# Patient Record
Sex: MALE | Race: WHITE | NOT HISPANIC OR LATINO | Employment: OTHER | URBAN - METROPOLITAN AREA
[De-identification: names, ages, dates, MRNs, and addresses within clinical notes are randomized per-mention and may not be internally consistent; named-entity substitution may affect disease eponyms.]

---

## 2017-04-21 ENCOUNTER — HISTORICAL (OUTPATIENT)
Dept: ADMINISTRATIVE | Facility: HOSPITAL | Age: 52
End: 2017-04-21

## 2017-04-21 LAB
ALT (SGPT): 30 IU/L (ref 3–33)
AST SERPL-CCNC: 22 IU/L (ref 10–40)
BUN SERPL-MCNC: 17 MG/DL (ref 8–20)
CALCIUM SERPL-MCNC: 9 MG/DL (ref 7.7–10.4)
CHLORIDE: 104 MMOL/L (ref 98–110)
CO2 SERPL-SCNC: 26.3 MMOL/L (ref 22.8–31.6)
CREATININE RANDOM URINE: 161 MG/DL
CREATININE: 0.82 MG/DL (ref 0.6–1.4)
GLUCOSE: 241 MG/DL (ref 70–99)
HBA1C MFR BLD: 8.8 % (ref 3.1–6.5)
MICROALBUM.,U,RANDOM: 14.2 MCG/ML (ref 0–19.9)
MICROALBUMIN/CREATININE RATIO: 9 (ref 0–30)
POTASSIUM SERPL-SCNC: 4 MMOL/L (ref 3.5–5)
SODIUM: 138 MMOL/L (ref 134–144)

## 2017-05-25 RX ORDER — ATORVASTATIN CALCIUM 10 MG/1
1 TABLET, FILM COATED ORAL DAILY
COMMUNITY
Start: 2017-03-29 | End: 2017-07-12 | Stop reason: DRUGHIGH

## 2017-05-25 RX ORDER — ASPIRIN 81 MG/1
1 TABLET ORAL DAILY
COMMUNITY
Start: 2016-07-18 | End: 2017-07-12 | Stop reason: SDUPTHER

## 2017-05-31 ENCOUNTER — OFFICE VISIT (OUTPATIENT)
Dept: FAMILY MEDICINE | Facility: CLINIC | Age: 52
End: 2017-05-31
Payer: COMMERCIAL

## 2017-05-31 VITALS
SYSTOLIC BLOOD PRESSURE: 126 MMHG | HEIGHT: 68 IN | BODY MASS INDEX: 25.01 KG/M2 | HEART RATE: 85 BPM | WEIGHT: 165 LBS | DIASTOLIC BLOOD PRESSURE: 77 MMHG

## 2017-05-31 DIAGNOSIS — E78.5 DYSLIPIDEMIA: ICD-10-CM

## 2017-05-31 DIAGNOSIS — E11.9 TYPE 2 DIABETES MELLITUS WITHOUT COMPLICATION, WITHOUT LONG-TERM CURRENT USE OF INSULIN: Primary | ICD-10-CM

## 2017-05-31 PROBLEM — Z13.89 ENCOUNTER FOR SCREENING FOR OTHER DISORDER: Status: ACTIVE | Noted: 2017-05-31

## 2017-05-31 PROBLEM — F17.290 NICOTINE DEPENDENCE, OTHER TOBACCO PRODUCT, UNCOMPLICATED: Status: ACTIVE | Noted: 2017-05-31

## 2017-05-31 PROCEDURE — 99214 OFFICE O/P EST MOD 30 MIN: CPT | Mod: ,,, | Performed by: INTERNAL MEDICINE

## 2017-05-31 PROCEDURE — 3045F PR MOST RECENT HEMOGLOBIN A1C LEVEL 7.0-9.0%: CPT | Mod: ,,, | Performed by: INTERNAL MEDICINE

## 2017-05-31 NOTE — PROGRESS NOTES
Subjective:       Patient ID: Pedro Luis Pérez is a 51 y.o. male.    Chief Complaint: Diabetes and Hyperlipidemia    Pt comes for follow up on DM and lipids. A1c is 8.8, LDL is 108. Urine Feroz albumin is N  Could not tolerate Lipitor and stopped. Sugar logs reviewed and sugars >180 in AM and PM    Patient blood sugars long segment been reviewed. He recently had an eye examination which was unremarkable. His last nutritional examination was approximately 5 years back.      Diabetes   He presents for his follow-up diabetic visit. He has type 2 diabetes mellitus. His disease course has been worsening. Pertinent negatives for hypoglycemia include no sweats or tremors. Pertinent negatives for diabetes include no blurred vision, no chest pain, no foot paresthesias, no foot ulcerations, no visual change and no weight loss. Pertinent negatives for hypoglycemia complications include no blackouts. Symptoms are worsening. Pertinent negatives for diabetic complications include no nephropathy, peripheral neuropathy or retinopathy. Risk factors for coronary artery disease include male sex and tobacco exposure. Current diabetic treatment includes diet and oral agent (dual therapy). His weight is stable. He is following a diabetic diet. He has not had a previous visit with a dietitian. His breakfast blood glucose range is generally 180-200 mg/dl. His lunch blood glucose range is generally 180-200 mg/dl. He does not see a podiatrist.Eye exam is current.   Hyperlipidemia   This is a chronic problem. The problem is uncontrolled. Recent lipid tests were reviewed and are high. He has no history of liver disease, obesity or nephrotic syndrome. Pertinent negatives include no chest pain. Risk factors for coronary artery disease include diabetes mellitus, dyslipidemia, male sex and a sedentary lifestyle.     Past Medical History:   Diagnosis Date    Anxiety     Diabetes mellitus, type 2        History reviewed. No pertinent surgical  history.    Family History   Problem Relation Age of Onset    Heart disease Mother     Diabetes Father        Social History     Social History    Marital status:      Spouse name: N/A    Number of children: N/A    Years of education: N/A     Social History Main Topics    Smoking status: Never Smoker    Smokeless tobacco: Former User     Types: Chew    Alcohol use No    Drug use: No    Sexual activity: Yes     Partners: Female     Other Topics Concern    None     Social History Narrative    None       Current Outpatient Prescriptions   Medication Sig Dispense Refill    aspirin (ECOTRIN) 81 MG EC tablet Take 1 tablet by mouth Daily.      SITagliptan-metformin (JANUMET XR) 100-1,000 mg TM24 Take 1 tablet by mouth Daily.      atorvastatin (LIPITOR) 10 MG tablet Take 1 tablet by mouth Daily.       No current facility-administered medications for this visit.        Review of patient's allergies indicates:  No Known Allergies      Review of Systems   Constitutional: Negative for activity change, chills, fever and weight loss.   HENT: Negative for congestion, drooling, ear pain, nosebleeds, rhinorrhea, sore throat and voice change.    Eyes: Negative for blurred vision, redness and visual disturbance.   Respiratory: Negative for apnea, cough, choking and wheezing.    Cardiovascular: Negative for chest pain, palpitations and leg swelling.   Gastrointestinal: Negative for abdominal distention, abdominal pain and anal bleeding.   Skin: Negative for color change and rash.   Neurological: Negative for tremors.       Objective:      Physical Exam   Constitutional: He appears well-developed.   Cardiovascular:   Pulses:       Dorsalis pedis pulses are 1+ on the right side, and 1+ on the left side.        Posterior tibial pulses are 1+ on the right side, and 1+ on the left side.   Musculoskeletal:        Right foot: There is normal range of motion and no deformity.        Left foot: There is normal range of  motion and no deformity.   Feet:   Right Foot:   Protective Sensation: 4 sites tested. 4 sites sensed.   Skin Integrity: Negative for ulcer or blister.   Left Foot:   Protective Sensation: 4 sites tested. 4 sites sensed.   Skin Integrity: Negative for ulcer or blister.   Skin: Skin is warm and dry.   Nursing note and vitals reviewed.      Assessment:       1. Type 2 diabetes mellitus without complication, without long-term current use of insulin    2. Dyslipidemia        Plan:       Type 2 diabetes mellitus without complication, without long-term current use of insulin  -     empagliflozin (JARDIANCE) 10 mg Tab; Take 10 mg by mouth once daily.  Dispense: 30 tablet; Refill: 5    Dyslipidemia    Pt started on Jardiance. Side effects explained. Risk for UTI  Keep sugar logs.  Follow up in 6 weeks  Referral has been given per dietitian for reevaluating patient's diet.

## 2017-05-31 NOTE — PATIENT INSTRUCTIONS
Using a Blood Sugar Log    You have diabetes. This means your body has trouble regulating a sugar called glucose. To help manage your diabetes, youll need to check your blood sugar level as directed by your healthcare provider. Keeping a log of your blood sugar levels will help you track your blood sugar readings. Its a simple and easy way to see how well you are controlling your diabetes.  Checking your blood sugar level  You can check your blood sugar level with a blood glucose meter. Youll first prick the side of your finger with a tiny lancet to draw a tiny drop of blood onto the test strip. Some glucose meters let you use another place on your body to test. But these other places should not be used in some cases as they may be inaccurate. Follow the instructions for your glucose meter. And talk with your healthcare provider before doing the test on other places.  The strip goes into the meter first, then a drop of blood is placed on the tip of the strip. The meter then shows a reading that tells you the level of your blood sugar. Your readings should be in your target range as often as possible. This means not too high or too low. Staying in this range helps lower your risk for complications. Your healthcare provider will help you figure out the target range that is best for you.  Tracking your readings  Every time you check your blood sugar, use your log to keep track of your readings. Your meter will also probably have a memory feature that your healthcare provider can check at your next visit. You may be advised by your healthcare provider to check your blood sugar in the morning, at bedtime, and before and after meals. Be sure to write down all of your numbers. Also use your log to record things that might have affected your blood sugar. Some examples include being sick, certain medicines, being physically active, feeling stressed, or skipping meals.   Lessons learned from your readings  Tracking your  blood sugar readings helps you see patterns. These patterns tell you how your actions affect your blood sugar. For instance, you may have higher numbers after eating certain foods or lower numbers after exercise. They just help you understand how to stay in your target range more often, so that your diabetes remains in good control.  Sharing your log with your healthcare team  Bring your blood sugar log and glucose meter with you to all of your healthcare appointments. This can help your healthcare team make changes to your treatment plan, if needed. This may involve making changes in what you eat, what medicines you take, or how much you exercise.  To learn more  The resources below can help you learn more:  · American Diabetes Association 429-610-6705 www.diabetes.org  · [employer] 057-915-1570 www.lighthouse.org  · [facility] [place] 497-008-9184 www.nei.nih.gov  · [facility] 875-522-4839 www.hormone.org  Date Last Reviewed: 5/1/2016  © 7811-0146 [employer] [address] [place] [address], [place] [address]. All rights reserved. This information is not intended as a substitute for professional medical care. Always follow your healthcare professional's instructions.

## 2017-06-16 ENCOUNTER — TELEPHONE (OUTPATIENT)
Dept: FAMILY MEDICINE | Facility: CLINIC | Age: 52
End: 2017-06-16

## 2017-06-19 DIAGNOSIS — E11.9 TYPE 2 DIABETES MELLITUS WITHOUT COMPLICATION, WITHOUT LONG-TERM CURRENT USE OF INSULIN: ICD-10-CM

## 2017-07-12 ENCOUNTER — OFFICE VISIT (OUTPATIENT)
Dept: FAMILY MEDICINE | Facility: CLINIC | Age: 52
End: 2017-07-12
Payer: COMMERCIAL

## 2017-07-12 VITALS
WEIGHT: 161 LBS | BODY MASS INDEX: 24.4 KG/M2 | HEIGHT: 68 IN | SYSTOLIC BLOOD PRESSURE: 118 MMHG | HEART RATE: 81 BPM | DIASTOLIC BLOOD PRESSURE: 77 MMHG

## 2017-07-12 DIAGNOSIS — E11.9 TYPE 2 DIABETES MELLITUS WITHOUT COMPLICATION, WITHOUT LONG-TERM CURRENT USE OF INSULIN: Primary | ICD-10-CM

## 2017-07-12 DIAGNOSIS — E78.5 DYSLIPIDEMIA: ICD-10-CM

## 2017-07-12 PROBLEM — Z13.89 ENCOUNTER FOR SCREENING FOR OTHER DISORDER: Status: RESOLVED | Noted: 2017-05-31 | Resolved: 2017-07-12

## 2017-07-12 PROCEDURE — 3045F PR MOST RECENT HEMOGLOBIN A1C LEVEL 7.0-9.0%: CPT | Mod: ,,, | Performed by: INTERNAL MEDICINE

## 2017-07-12 PROCEDURE — 4010F ACE/ARB THERAPY RXD/TAKEN: CPT | Mod: ,,, | Performed by: INTERNAL MEDICINE

## 2017-07-12 PROCEDURE — 99214 OFFICE O/P EST MOD 30 MIN: CPT | Mod: ,,, | Performed by: INTERNAL MEDICINE

## 2017-07-12 RX ORDER — ATORVASTATIN CALCIUM 10 MG/1
TABLET, FILM COATED ORAL
Qty: 18 TABLET | Refills: 3 | Status: SHIPPED | OUTPATIENT
Start: 2017-07-12 | End: 2017-11-13 | Stop reason: SDUPTHER

## 2017-07-12 RX ORDER — RAMIPRIL 1.25 MG/1
CAPSULE ORAL
Qty: 40 CAPSULE | Refills: 3 | Status: SHIPPED | OUTPATIENT
Start: 2017-07-12 | End: 2018-07-12 | Stop reason: SDUPTHER

## 2017-07-12 RX ORDER — ASPIRIN 81 MG/1
81 TABLET ORAL DAILY
Qty: 100 TABLET | Refills: 1 | Status: SHIPPED | OUTPATIENT
Start: 2017-07-12

## 2017-07-12 NOTE — PROGRESS NOTES
Subjective:       Patient ID: Pedro Luis Pérez is a 51 y.o. male.    Chief Complaint: Diabetes and Hyperlipidemia    Diabetes   He presents for his follow-up diabetic visit. He has type 2 diabetes mellitus. His disease course has been improving. Pertinent negatives for hypoglycemia include no dizziness or nervousness/anxiousness. Associated symptoms include weight loss. Pertinent negatives for diabetes include no chest pain, no foot paresthesias, no foot ulcerations, no polydipsia, no polyuria and no weakness. There are no hypoglycemic complications. Risk factors for coronary artery disease include diabetes mellitus, dyslipidemia, male sex and sedentary lifestyle. Current diabetic treatment includes oral agent (triple therapy) (Janumet, Jardiance). His weight is decreasing steadily. His home blood glucose trend is decreasing steadily. His breakfast blood glucose range is generally 130-140 mg/dl. An ACE inhibitor/angiotensin II receptor blocker is not being taken. He does not see a podiatrist.Eye exam is current.   Hyperlipidemia   This is a chronic problem. Exacerbating diseases include diabetes. He has no history of liver disease or obesity. Pertinent negatives include no chest pain or shortness of breath. Current antihyperlipidemic treatment includes statins (non compliant- feet hurts). Compliance problems include medication side effects.  Risk factors for coronary artery disease include a sedentary lifestyle, male sex, hypertension, diabetes mellitus and dyslipidemia.       Past Medical History:   Diagnosis Date    Anxiety     Diabetes mellitus, type 2      Social History     Social History    Marital status:      Spouse name: N/A    Number of children: N/A    Years of education: N/A     Occupational History    Not on file.     Social History Main Topics    Smoking status: Never Smoker    Smokeless tobacco: Former User     Types: Chew    Alcohol use No    Drug use: No    Sexual activity: Yes      "Partners: Female     Other Topics Concern    Not on file     Social History Narrative    No narrative on file     History reviewed. No pertinent surgical history.  Family History   Problem Relation Age of Onset    Heart disease Mother     Diabetes Father        Review of Systems   Constitutional: Positive for weight loss. Negative for activity change, appetite change, chills, fever and unexpected weight change (lost wt expectedly).   HENT: Negative for congestion.    Eyes: Negative for pain and itching.   Respiratory: Negative for cough, chest tightness and shortness of breath.    Cardiovascular: Negative for chest pain, palpitations and leg swelling.   Gastrointestinal: Negative for abdominal distention and anal bleeding.   Endocrine: Negative for cold intolerance, heat intolerance, polydipsia and polyuria.   Genitourinary: Negative for dysuria, flank pain and frequency.   Neurological: Negative for dizziness, facial asymmetry and weakness.   Hematological: Negative for adenopathy. Does not bruise/bleed easily.   Psychiatric/Behavioral: Negative for agitation and behavioral problems. The patient is not nervous/anxious.        Objective:       Vitals:    07/12/17 0807   BP: 118/77   Pulse: 81   Weight: 73 kg (161 lb)   Height: 5' 8" (1.727 m)     Physical Exam   Constitutional: He is oriented to person, place, and time. He appears well-developed.   HENT:   Head: Normocephalic and atraumatic.   Nose: Nose normal.   Mouth/Throat: Oropharynx is clear and moist. No oropharyngeal exudate.   Eyes: Conjunctivae and EOM are normal.   Neck: Normal range of motion. Neck supple. No JVD present. No tracheal deviation present. No thyromegaly present.   Cardiovascular: Normal rate, regular rhythm and normal heart sounds.  Exam reveals no gallop and no friction rub.    No murmur heard.  Pulmonary/Chest: Effort normal and breath sounds normal. No respiratory distress. He has no wheezes. He has no rales.   Abdominal: Soft. Bowel " sounds are normal. He exhibits no distension. There is no tenderness.   Musculoskeletal: Normal range of motion.   Feet:   Right Foot:   Protective Sensation: 4 sites tested. 4 sites sensed.   Skin Integrity: Negative for ulcer or blister.   Left Foot:   Protective Sensation: 4 sites tested. 4 sites sensed.   Skin Integrity: Negative for ulcer or blister.   Neurological: He is alert and oriented to person, place, and time. He has normal reflexes.   Skin: Skin is warm and dry.   Psychiatric: He has a normal mood and affect.   Nursing note and vitals reviewed.      Assessment:       1. Type 2 diabetes mellitus without complication, without long-term current use of insulin    2. Dyslipidemia         Plan:           Type 2 diabetes mellitus without complication, without long-term current use of insulin  -     SITagliptan-metformin (JANUMET XR) 100-1,000 mg TM24; Take 1 tablet by mouth Daily.  Dispense: 90 tablet; Refill: 3  -     empagliflozin (JARDIANCE) 10 mg Tab; Take 10 mg by mouth once daily.  Dispense: 90 tablet; Refill: 3  -     aspirin (ECOTRIN) 81 MG EC tablet; Take 1 tablet (81 mg total) by mouth Daily.  Dispense: 100 tablet; Refill: 1  -     Comprehensive metabolic panel; Future; Expected date: 10/12/2017  -     Hemoglobin A1c; Future; Expected date: 07/12/2017  -     ramipril (ALTACE) 1.25 MG capsule; On Monday, Wednesday and Friday AM  Dispense: 40 capsule; Refill: 3    Dyslipidemia  -     atorvastatin (LIPITOR) 10 MG tablet; 1/2 tablet three times a week at bedtime. Dose reduced due to side effects at higher dose  Dispense: 18 tablet; Refill: 3  -     Lipid panel; Future; Expected date: 10/12/2017    Patient states that his blood sugars are getting better. Morning blood sugars are in 130s.  Medication added was Jardiance. He does not experience any side effects leg burning sensation in the urine.    He has not been taking Lipitor because of side effects in the feet. I've told him to take Lipitor perhaps 2  or 3 times a week-half a pill. I will also add ramipril 1.25 mg 3 times a week for kidney protection.    I will see him back in 3 months to check his hemoglobin A1c, lipid panel and chemistry. In the meantime he'll continue to watch his diet, cut down sugars and incorporate a regular exercise regimen.

## 2017-07-12 NOTE — PATIENT INSTRUCTIONS
"  Exercise to Manage Your Blood Sugar    Being physically active every day can help you manage your blood sugar. Thats because an active lifestyle can improve your bodys ability to use insulin. Daily activity can also help delay or prevent complications of diabetes. And its a great way to relieve stress. If you arent normally active, be sure to consult your healthcare provider before getting started.  How much activity do you need?  If daily activity is new to you, start slow and steady. Begin with 10 minutes of activity each day. Then work up to at least 40 minutes of moderate to high intensity physical activity on at least 3 to 4 days each week. Do this by adding a few minutes each week. It doesnt have to be done all at once. Each active period throughout the day adds up.  Just move!  You dont have to join a gym or own pricey sports equipment. Just get out and walk. Walking is an aerobic exercise that makes your heart and lungs work hard. It helps your heart and blood vessels. Walking needs only a sturdy pair of sneakers and your own two feet. The more you walk, the easier it gets:  · Schedule time every day to move your feet.  · Make it part of your daily routine.  · Walk with a friend or a group to keep it interesting and fun.  · Try taking several short walks during the day to meet your daily activity goal.  A pedometer makes every step count  A pedometer is a small device that keeps track of how many steps you take. You can clip it to your belt (or a strap on your arm or leg) and go about your daily routine. "Smartphones" now also have apps to record your walking. At the end of the day, the pedometer shows the total number of steps you took. Use a pedometer to set daily goals for yourself. For instance, if you walk 4,000 steps a day, try adding 200 more steps each day. Aim for a goal of 7,500. With every step, youre doing a little more to help your body use insulin.   Adding resistance " exercise  Resistance exercise (also called strength training), makes muscles stronger. It also helps muscles use insulin better. Ask your healthcare provider whether this type of exercise is right for you. If it is, your healthcare provider can help you work it in to your activity plan.  Staying safe  Being active may cause blood sugar to drop faster than usual. This is especially true if you take medicine to manage your blood sugar. But there are things you can do to help reduce the risk of accidental lows. Keep these tips in mind:  · Always carry identification when you exercise outside your home. Carry a cell phone to use in case of emergency.  · If you can, include friends and family in your activities.  · Wear a medical ID bracelet that says you have diabetes.  · Use the right safety equipment for the activity you do (such as a bicycle helmet when you ride a bicycle outdoors). Wear closed-toed shoes that fit your feet well.  · Drink plenty of water before and during activity.  · Keep a fast-acting sugar (such as glucose tablets) on hand in case of low blood sugar.  · Dress properly for the weather. Wear a hat if its yuli, or wait until evening if its too hot.  · Avoid being active for long periods in very hot or very cold weather.  · Skip activity if youre sick.     Notice how activity affects blood sugar  Physical activity is important when you have diabetes. But you need to keep an eye on your blood sugar level. Check often if you have been active for longer than usual, or if the activity was unplanned. Make it a habit to check your blood sugar before being active. And check again a few hours later. Use your log book to write down how activity affects your numbers. If you take insulin, you may be able to adjust your dose before a planned activity. This can help prevent lows. You may also need to take a small carbohydrate snack before the exercise. Talk to your healthcare provider to learn more.    Date  Last Reviewed: 6/1/2016  © 2162-2306 [employer] [address] [place] [address], [place] [address]. All rights reserved. This information is not intended as a substitute for professional medical care. Always follow your healthcare professional's instructions.

## 2017-10-12 ENCOUNTER — OFFICE VISIT (OUTPATIENT)
Dept: FAMILY MEDICINE | Facility: CLINIC | Age: 52
End: 2017-10-12
Payer: COMMERCIAL

## 2017-10-12 VITALS
SYSTOLIC BLOOD PRESSURE: 124 MMHG | DIASTOLIC BLOOD PRESSURE: 79 MMHG | HEART RATE: 71 BPM | HEIGHT: 68 IN | BODY MASS INDEX: 24.55 KG/M2 | WEIGHT: 162 LBS

## 2017-10-12 DIAGNOSIS — E78.5 DYSLIPIDEMIA: ICD-10-CM

## 2017-10-12 DIAGNOSIS — R10.9 RIGHT FLANK PAIN: ICD-10-CM

## 2017-10-12 DIAGNOSIS — E11.9 TYPE 2 DIABETES MELLITUS WITHOUT COMPLICATION, WITHOUT LONG-TERM CURRENT USE OF INSULIN: Primary | ICD-10-CM

## 2017-10-12 DIAGNOSIS — K80.20 CALCULUS OF GALLBLADDER WITHOUT CHOLECYSTITIS WITHOUT OBSTRUCTION: ICD-10-CM

## 2017-10-12 DIAGNOSIS — Z11.59 NEED FOR HEPATITIS C SCREENING TEST: ICD-10-CM

## 2017-10-12 PROCEDURE — 99214 OFFICE O/P EST MOD 30 MIN: CPT | Mod: ,,, | Performed by: INTERNAL MEDICINE

## 2017-10-12 NOTE — PROGRESS NOTES
Subjective:       Patient ID: Pedro Luis Pérez is a 52 y.o. male.    Chief Complaint: Diabetes (lab review) and Hyperlipidemia    Mr. Pérez is a 52-year-old  male who comes for follow-up. He has underlying diabetes mellitus, dyslipidemia. His fasting blood sugar in recent labs was 171. Hemoglobin A1c is pending. He states that his blood sugars are gradually getting better. He is on a combination of Janumet and Jardiance.    He is also experiencing right posterior flank pain since several years which gets aggravated then and now. Recently he was trying to unload some stuff any further aggravation of his right posterior flank pain. There is no radiation of this pain.    I dictate the opportunity to review his old CT scan which had shown a less than 1 cm gallstone at the neck and also a non  OBSTRUCTING right renal calculus.    Thus far, he does not recall passing any stones or noticing any blood in his urine. He does not have any prostate symptoms.      Diabetes   He presents for his follow-up diabetic visit. He has type 2 diabetes mellitus. No MedicAlert identification noted. Pertinent negatives for hypoglycemia include no dizziness, nervousness/anxiousness, pallor, speech difficulty or sweats. Pertinent negatives for diabetes include no chest pain, no polydipsia, no polyphagia, no polyuria and no weakness. Pertinent negatives for hypoglycemia complications include no nocturnal hypoglycemia. Risk factors for coronary artery disease include hypertension, male sex, sedentary lifestyle and dyslipidemia. He has not had a previous visit with a dietitian. An ACE inhibitor/angiotensin II receptor blocker is being taken. He does not see a podiatrist.Eye exam is not current.   Hyperlipidemia   This is a chronic problem. The current episode started more than 1 year ago. Pertinent negatives include no chest pain or shortness of breath. Current antihyperlipidemic treatment includes statins.   Back Pain   This is a  chronic problem. The current episode started more than 1 year ago (5-6 yrs ago). The problem occurs intermittently. The problem has been waxing and waning since onset. Pertinent negatives include no chest pain, dysuria, fever or weakness. Risk factors include sedentary lifestyle. The treatment provided mild relief.   Flank Pain   This is a chronic problem. The current episode started more than 1 year ago. The problem occurs intermittently. The problem has been waxing and waning since onset. The pain is present in the costovertebral angle (rt). The quality of the pain is described as cramping. The pain does not radiate. The pain is at a severity of 3/10. The symptoms are aggravated by bending. Pertinent negatives include no chest pain, dysuria, fever or weakness. Risk factors include lack of exercise and renal stones (also solitary gall stone). He has tried NSAIDs for the symptoms. The treatment provided mild relief.       Past Medical History:   Diagnosis Date    Anxiety     Diabetes mellitus, type 2      Social History     Social History    Marital status:      Spouse name: N/A    Number of children: N/A    Years of education: N/A     Occupational History    Not on file.     Social History Main Topics    Smoking status: Never Smoker    Smokeless tobacco: Former User     Types: Chew    Alcohol use No    Drug use: No    Sexual activity: Yes     Partners: Female     Other Topics Concern    Not on file     Social History Narrative    No narrative on file     History reviewed. No pertinent surgical history.  Family History   Problem Relation Age of Onset    Heart disease Mother     Diabetes Father        Review of Systems   Constitutional: Negative for activity change, appetite change, chills, fever and unexpected weight change (lost wt expectedly).   HENT: Negative for congestion.    Eyes: Negative for pain and itching.   Respiratory: Negative for cough, chest tightness and shortness of breath.     Cardiovascular: Negative for chest pain, palpitations and leg swelling.   Gastrointestinal: Negative for abdominal distention and anal bleeding.   Endocrine: Negative for cold intolerance, heat intolerance, polydipsia, polyphagia and polyuria.        Underlying diabetes and dyslipidemia.   Genitourinary: Negative for dysuria, flank pain and frequency.   Musculoskeletal: Positive for back pain.   Skin: Negative for pallor.   Neurological: Negative for dizziness, facial asymmetry, speech difficulty and weakness.   Hematological: Negative for adenopathy. Does not bruise/bleed easily.   Psychiatric/Behavioral: Negative for agitation and behavioral problems. The patient is not nervous/anxious.        Objective:      Physical Exam   Constitutional: He is oriented to person, place, and time. He appears well-developed.   HENT:   Head: Normocephalic and atraumatic.   Nose: Nose normal.   Mouth/Throat: Oropharynx is clear and moist. No oropharyngeal exudate.   Eyes: Conjunctivae and EOM are normal.   Neck: Normal range of motion. Neck supple. No JVD present. No tracheal deviation present. No thyromegaly present.   Cardiovascular: Normal rate, regular rhythm and normal heart sounds.  Exam reveals no gallop and no friction rub.    No murmur heard.  Pulses:       Dorsalis pedis pulses are 1+ on the right side, and 1+ on the left side.        Posterior tibial pulses are 1+ on the right side, and 1+ on the left side.   Pulmonary/Chest: Effort normal and breath sounds normal. No respiratory distress. He has no wheezes. He has no rales.   Abdominal: Soft. Bowel sounds are normal. He exhibits no distension. There is no tenderness.   Musculoskeletal: Normal range of motion.        Right foot: There is normal range of motion and no deformity.        Left foot: There is normal range of motion and no deformity.   Feet:   Right Foot:   Protective Sensation: 4 sites tested. 4 sites sensed.   Skin Integrity: Negative for ulcer or blister.    Left Foot:   Protective Sensation: 4 sites tested. 4 sites sensed.   Skin Integrity: Negative for ulcer or blister.   Neurological: He is alert and oriented to person, place, and time. He has normal reflexes.   Skin: Skin is warm and dry.   Psychiatric: He has a normal mood and affect.   Nursing note and vitals reviewed.      Assessment:       1. Type 2 diabetes mellitus without complication, without long-term current use of insulin    2. Dyslipidemia    3. Right flank pain    4. Calculus of gallbladder without cholecystitis without obstruction    5. Need for hepatitis C screening test       patient's blood sugar is 171. Hemoglobin A1c has not been done for some reason at this point. This will be repeated.  Plan:           Type 2 diabetes mellitus without complication, without long-term current use of insulin  -     Microalbumin/creatinine urine ratio; Future  -     Hemoglobin A1c; Future; Expected date: 10/12/2017    Dyslipidemia  -     Lipid panel; Future; Expected date: 10/12/2017    Right flank pain  -     US Abdomen Limited  -     Urinalysis; Future    Calculus of gallbladder without cholecystitis without obstruction  -     US Abdomen Limited    Need for hepatitis C screening test  -     Hepatitis C antibody; Future; Expected date: 10/12/2017    Advised Mr. Pérez to monitor Blood sugars at home and record them.  Exercise, watch diet and loose weight.  keep a close eye on feet and keep them clean. Annual eye examination. Annual influenza vaccine.  Monitor HgbA1c every 3 to 6 months. Monitor urine microalbumin every year.keep LDL less than 100. Monitor blood pressure and target blood pressure 120/70.      Given his flank pain, I will order ultrasound off gallbladder and right-sided kidney also. He will carefully watch his urine for any problems. I'll check a urine analysis also. Check out for any hematuria. He will continue with his stretching exercises. Next    I will follow him in 3-4 months or earlier based  upon ultrasound findings.

## 2017-10-12 NOTE — PATIENT INSTRUCTIONS
Healthy Meals for Diabetes     A healthcare provider will help you develop a meal plan that fits your needs.   Ask your healthcare team to help you make a meal plan that fits your needs. Your meal plan tells you when to eat your meals and snacks, what kinds of foods to eat, and how much of each food to eat. You dont have to give up all the foods you like. But you do need to follow some guidelines.  Choose healthy carbohydrates  Starches, sugars, and fiber are all types of carbohydrates. Fiber can help lower your cholesterol and triglycerides. Fiber is also healthy for your heart. You should have 20 to 35 grams of total fiber each day. Fiber-rich foods include:  · Whole-grain breads and cereals  · Bulgur wheat  · Brown rice     · Whole-wheat pasta  · Fruits and vegetables  · Dry beans, and peas   Keep track of the amount of carbohydrates you eat. This can help you keep the right balance of physical activity and medicine. The amount of carbohydrates needed will vary for each person. It depends on many things such as your health, the medicines you take, and how active you are. Your healthcare team will help you figure out the right amount of carbohydrates for you. You may start with around 45 to 60 grams of carbohydrates per meal, depending on your situation.   Here are some examples of foods containing about 15 grams of carbohydrates (1 serving of carbohydrates):  · 1/2 cup of canned or frozen fruit  · A small piece of fresh fruit (4 ounces)  · 1 slice of bread  · 1/2 cup of oatmeal  · 1/3 cup of rice  · 4 to 6 crackers  · 1/2 English muffin  · 1/2 cup of black beans  · 1/4 of a large baked potato (3 ounces)  · 2/3 cup of plain fat-free yogurt  · 1 cup of soup  · 1/2 cup of casserole  · 6 chicken nuggets  · 2-inch-square brownie or cake without frosting  · 2 small cookies  · 1/2 cup of ice cream or sherbet  Choose healthy protein foods  Eating protein that is low in fat can help you control your weight. It also  helps keep your heart healthy. Low-fat protein foods include:  · Fish  · Plant proteins, such as dry beans and peas, nuts, and soy products like tofu and soymilk  · Lean meat with all visible fat removed  · Poultry with the skin removed  · Low-fat or nonfat milk, cheese, and yogurt  Limit unhealthy fats and sugar  Saturated and trans fats are unhealthy for your heart. They raise LDL (bad) cholesterol. Fat is also high in calories, so it can make you gain weight. To cut down on unhealthy fats and sugar, limit these foods:  · Butter or margarine  · Palm and palm kernel oils and coconut oil  · Cream  · Cheese  · Ernst  · Lunch meats     · Ice cream  · Sweet bakery goods such as pies, muffins, and donuts  · Jams and jellies  · Candy bars  · Regular sodas   How much to eat  The amount of food you eat affects your blood sugar. It also affects your weight. Your healthcare team will tell you how much of each type of food you should eat.  · Use measuring cups and spoons and a food scale to measure serving sizes.  · Learn what a correct serving size looks like on your plate. This will help when you are away from home and cant measure your servings.  · Eat only the number of servings given on your meal plan for each food. Dont take seconds.  · Learn to read food labels. Be sure to look at serving size, total carbohydrates, fiber, calories, sugar, and saturated and trans fats. Look for healthier alternatives to foods that have added sugar.  · Plan ahead for parties so you can still have a good time without going overboard with unhealthy food choices. Set a good example yourself by bringing a healthy dish to pot lucks.   Choose healthy snacks  When it comes to snacks, we usually think about foods with added sugar and fats. But there are many other options for healthier snack choices. Here are a few snack ideas to choose from:  Snacks with less than 5 grams of carbohydrates  · 1 piece of string cheese  · 3 celery sticks plus 1  tablespoon of peanut butter  · 5 cherry tomatoes plus 1 tablespoon of ranch dressing  · 1 hard-boiled egg  · 1/4 cup of fresh blueberries  ·  5 baby carrots  · 1 cup of light popcorn  · 1/2 cup of sugar-free gelatin  · 15 almonds  Snacks with about 10 to 20 grams of carbohydrates  · 1/3 cup of hummus plus 1 cup of fresh cut nonstarchy vegetables (carrots, green peppers, broccoli, celery, or a combination)  · 1/2 cup of fresh or canned fruit plus 1/4 cup of cottage cheese  · 1/2 cup of tuna salad with 4 crackers  · 2 rice cakes and a tablespoon of peanut butter  · 1 small apple or orange  · 3 cups light popcorn  · 1/2 of a turkey sandwich (1 slice of whole-wheat bread, 2 ounces of turkey, and mustard)  Portion sizes are important to controlling your blood sugar and staying at a healthy weight. Stock up on healthy snack items so you always have them on hand.  When to eat  Your meal plan will likely include breakfast, lunch, dinner, and some snacks.  · Try to eat your meals and snacks at about the same times each day.  · Eat all your meals and snacks. Skipping a meal or snack can make your blood sugar drop too low. It can also cause you to eat too much at the next meal or snack. Then your blood sugar could get too high.  Date Last Reviewed: 7/1/2016  © 1871-7473 [employer]. [address] [place] [address], [place] [address]. All rights reserved. This information is not intended as a substitute for professional medical care. Always follow your healthcare professional's instructions.

## 2017-10-16 PROBLEM — Z12.11 SCREENING FOR COLON CANCER: Status: ACTIVE | Noted: 2017-05-31

## 2017-10-16 PROBLEM — Z12.11 SCREENING FOR COLON CANCER: Status: ACTIVE | Noted: 2017-10-13

## 2017-11-13 DIAGNOSIS — E78.5 DYSLIPIDEMIA: ICD-10-CM

## 2017-11-13 RX ORDER — ATORVASTATIN CALCIUM 10 MG/1
TABLET, FILM COATED ORAL
Qty: 18 TABLET | Refills: 3 | Status: SHIPPED | OUTPATIENT
Start: 2017-11-13 | End: 2019-05-09 | Stop reason: SDUPTHER

## 2018-05-07 DIAGNOSIS — E11.9 TYPE 2 DIABETES MELLITUS WITHOUT COMPLICATION, WITHOUT LONG-TERM CURRENT USE OF INSULIN: ICD-10-CM

## 2018-06-04 ENCOUNTER — TELEPHONE (OUTPATIENT)
Dept: FAMILY MEDICINE | Facility: CLINIC | Age: 53
End: 2018-06-04

## 2018-06-04 DIAGNOSIS — M79.671 HEEL PAIN, BILATERAL: Primary | ICD-10-CM

## 2018-06-04 DIAGNOSIS — M79.672 HEEL PAIN, BILATERAL: Primary | ICD-10-CM

## 2018-06-04 NOTE — TELEPHONE ENCOUNTER
----- Message from Viv Villegas sent at 6/4/2018 10:04 AM CDT -----  Contact: PATIENT  PT NEEDS REFERRAL TO FOOT . pLEASE CALL HIM [phone].

## 2018-06-27 LAB
ALBUMIN/CREAT UR: 5.4 MG/G CREAT (ref 0–30)
APPEARANCE UR: CLEAR
BILIRUB UR QL STRIP: NEGATIVE
CHOLEST SERPL-MCNC: 189 MG/DL (ref 100–199)
COLOR UR: YELLOW
CREAT UR-MCNC: 57.1 MG/DL
GLUCOSE UR QL: ABNORMAL
HBA1C MFR BLD: 7.9 % (ref 4.8–5.6)
HCV AB S/CO SERPL IA: <0.1 S/CO RATIO (ref 0–0.9)
HDLC SERPL-MCNC: 48 MG/DL
HGB UR QL STRIP: NEGATIVE
KETONES UR QL STRIP: NEGATIVE
LDLC SERPL CALC-MCNC: 125 MG/DL (ref 0–99)
LEUKOCYTE ESTERASE UR QL STRIP: NEGATIVE
MICRO URNS: ABNORMAL
MICROALBUMIN UR-MCNC: 3.1 UG/ML
NITRITE UR QL STRIP: NEGATIVE
PH UR STRIP: 5 [PH] (ref 5–7.5)
PROT UR QL STRIP: NEGATIVE
SP GR UR: >=1.03 (ref 1–1.03)
TRIGL SERPL-MCNC: 81 MG/DL (ref 0–149)
UROBILINOGEN UR STRIP-MCNC: 0.2 MG/DL (ref 0.2–1)
VLDLC SERPL CALC-MCNC: 16 MG/DL (ref 5–40)

## 2018-07-02 ENCOUNTER — OFFICE VISIT (OUTPATIENT)
Dept: FAMILY MEDICINE | Facility: CLINIC | Age: 53
End: 2018-07-02
Payer: COMMERCIAL

## 2018-07-02 ENCOUNTER — TELEPHONE (OUTPATIENT)
Dept: FAMILY MEDICINE | Facility: CLINIC | Age: 53
End: 2018-07-02

## 2018-07-02 VITALS
SYSTOLIC BLOOD PRESSURE: 110 MMHG | WEIGHT: 160 LBS | HEART RATE: 85 BPM | DIASTOLIC BLOOD PRESSURE: 73 MMHG | HEIGHT: 68 IN | BODY MASS INDEX: 24.25 KG/M2

## 2018-07-02 DIAGNOSIS — N20.0 NEPHROLITHIASIS: ICD-10-CM

## 2018-07-02 DIAGNOSIS — K80.50 CALCULUS OF BILE DUCT WITHOUT CHOLECYSTITIS AND WITHOUT OBSTRUCTION: ICD-10-CM

## 2018-07-02 DIAGNOSIS — E78.5 DYSLIPIDEMIA: ICD-10-CM

## 2018-07-02 DIAGNOSIS — E11.9 TYPE 2 DIABETES MELLITUS WITHOUT COMPLICATION, WITHOUT LONG-TERM CURRENT USE OF INSULIN: Primary | ICD-10-CM

## 2018-07-02 PROCEDURE — 99214 OFFICE O/P EST MOD 30 MIN: CPT | Mod: ,,, | Performed by: INTERNAL MEDICINE

## 2018-07-02 PROCEDURE — 3008F BODY MASS INDEX DOCD: CPT | Mod: ,,, | Performed by: INTERNAL MEDICINE

## 2018-07-02 NOTE — TELEPHONE ENCOUNTER
----- Message from Alan Stewart MD sent at 6/29/2018 11:27 AM CDT -----  Notify pt [facility] abd does show gall stone which was seen in CT scan in  Past. May or may not cause him the back pain on rt side. Keep appt on 7.2.18 for further options and plan

## 2018-07-02 NOTE — PATIENT INSTRUCTIONS
Lifestyle Changes to Control Cholesterol  You can control your cholesterol through diet, exercise, weight management, quitting smoking, stress management, and taking your medicines right. These things can also lower your risk for cardiovascular disease.    Eating healthy  Your healthcare provider will give you information on diet changes you may need to make. Your provider may recommend that you see a registered dietitian for help with diet changes. Changes may include:  · Cutting back on the amount of fat and cholesterol in your meals  · Eating less salt (sodium). This is especially important if you have high blood pressure.  · Eating more fresh vegetables and fruits  · Eating lean proteins such as fish, poultry, beans, and peas  · Eating less red meat and processed meats  · Using low-fat dairy products  · Using vegetable and nut oils in limited amounts  · Limiting how many sweets and processed foods like chips, cookies, and baked goods that you eat   · Limiting how many sugar-sweetened beverages you drink  · Limiting how often you eat out  Getting exercise  Regular exercise is a good way to help your body control cholesterol. Regular exercise can help in many ways. It can:  · Raise your good cholesterol  · Help lower your bad cholesterol  · Let blood flow better through your body  · Give more oxygen to your muscles and tissues  · Help you manage your weight  · Help your heart pump better  · Lower your blood pressure  Your healthcare provider may recommend that you get more physical activity if you haven't been active. Your provider may recommend that you get moderate to vigorous physical activity for at least 40 minutes each day. You should do this for at least 3 to 4 days each week. A few examples of moderate to vigorous activity are:  · Walking at a brisk pace. This is about 3 to 4 miles per hour.  · Jogging or running  · Swimming or water aerobics  · Hiking  · Dancing  · Martial arts  · Tennis  · Riding a  bicycle or stationary bike  · Dancing  Managing your weight  If you are overweight or obese, your healthcare provider will work with you to help you lose weight and lower your BMI (body mass index). Making diet changes and getting more physical activity can help. Changing your diet will help you lose weight more easily than adding exercise.  Quitting smoking  Smoking and other tobacco use can raise cholesterol and make it harder to control. Quitting is tough. But millions of people have given up tobacco for good. You can quit, too! Think about some of the reasons below to quit smoking. Do any of them make you think twice about your smoking habit?  Stop smoking because it:  · Keeps your cholesterol high, even if you make all the other changes youre supposed to  · Damages your body. It especially harms your heart, lungs, skin, and blood vessels.  · Makes you more likely to have a heart attack (acute myocardial infarction), stroke, or cancer  · Stains your teeth  · Makes your skin, clothes, and breath smell bad  · Costs a lot of money  Controlling stress   Learn ways to control stress. This will help you deal with stress in your home and work life. Controlling stress can greatly lower your risk of getting cardiovascular disease.  Making the most of medicines  Healthy eating and exercise are a good start to keeping your cholesterol down. But you may need some extra help from medicine. If your doctor prescribes medicine, be sure to take it exactly as directed. Remember:  · Tell your healthcare provider about all other medicines you take. This includes vitamins and herbs.  · Tell your healthcare provider if you have any side effects after starting to take a medicine. Examples of side effects to watch for include muscle aches, weakness, blurred vision, rust-colored urine, yellowing of eyes or skin (jaundice), and headache.  · Dont skip a dose or stop taking your medicine because you feel better or because  your cholesterol numbers go down. Never stop taking your medicine unless your healthcare provider has told you its OK.  · Ask your healthcare provider if you have any questions about your medicines.  High risk groups  Some people may need to take medicines called statins to control their cholesterol. This is in addition to eating a healthy diet and getting regular exercise.  Statins can help you stay healthy. They can also help prevent a heart attack or stroke. You may need to take a statin if you are in one of these groups:  · Adults who have had a heart attack or stroke. Or adults who have had peripheral vascular disease, a ministroke (transient ischemic attack), or stable or unstable angina. This group also includes people who have had a procedure to restore blood flow through a blocked artery. These procedures include percutaneous coronary intervention, angioplasty, stent, and open-heart bypass surgery.  · Adults who have diabetes. Or adults who are at higher risk of having a heart attack or stroke and have an LDL cholesterol level of 70 to 189 mg/dL  · Adults who are 21 years old or older and have an LDL cholesterol level of 190 mg/dL or higher.  If you are in a high-risk group, talk with your healthcare provider about your treatment goals. Make sure you understand why these goals are important, based on your own health history and your family history of heart disease or high cholesterol.  Make a plan to have regular cholesterol checks. You may need to fast before getting this test. Also ask your provider about any side effects your medicines may cause. Let your provider know about any side effects you have. You may need to take more than one medicine to reach the cholesterol goals that you and your provider decide on.  Date Last Reviewed: 10/1/2016  © 0821-9458 [employer]. [address] [place] [address], [place] [address]. All rights reserved. This information is not intended as a substitute for  professional medical care. Always follow your healthcare professional's instructions.

## 2018-07-02 NOTE — PROGRESS NOTES
Subjective:       Patient ID: Pedro Luis Pérez is a 52 y.o. male.    Chief Complaint: Diabetes (lab review ); Results (ct scan ); and Hyperlipidemia    Mr. Pérez is a pleasant 52-year-old  male who comes for follow-up. He has underlying sugar diabetes, dyslipidemia. His recent hemoglobin A1c is 7.9 and last year it was greater than 8. He is on a combination of metformin/Januvia and recently has been added on SGLT 2 inhibitor-Jardiance.  He also had an ultrasound of the gallbladder region done. It showed a bile duct stone without any evidence of cholecystitis. He does recall that both his parents had gallbladder stones. He does not recall any other family member having gallbladder stones.    I did review old records and he had seen general surgeon Dr. Gibran Khan way back in 2014 when he was given the option of having a gallbladder removed. After that he did not follow-up for Dr. Gibran Khan. In the interim he has been mostly asymptomatic except for few occasions of right upper quadrant discomfort and pain.    I did take the opportunity to review his CT scan (2014) as mentioned above and it did show incidental kidney stones without any evidence of obstruction. Patient was not aware of this diagnosis. He is not sure if there are kidney stones in his family as he is aware of gallstones running in his parents.        Diabetes   He presents for his follow-up diabetic visit. He has type 2 diabetes mellitus. No MedicAlert identification noted. His disease course has been stable. Pertinent negatives for hypoglycemia include no confusion, dizziness, nervousness/anxiousness, pallor, seizures or speech difficulty. Pertinent negatives for diabetes include no fatigue, no polydipsia, no polyphagia and no polyuria. He is compliant with treatment most of the time. Meal planning includes avoidance of concentrated sweets. He has not had a previous visit with a dietitian. An ACE inhibitor/angiotensin II receptor blocker  is being taken. He does not see a podiatrist.Eye exam is current.   Hyperlipidemia   This is a chronic problem. The current episode started more than 1 year ago. The problem is controlled. Exacerbating diseases include obesity. Current antihyperlipidemic treatment includes statins. Risk factors for coronary artery disease include a sedentary lifestyle, male sex, dyslipidemia and diabetes mellitus.       Past Medical History:   Diagnosis Date    Anxiety     Diabetes mellitus, type 2      Social History     Social History    Marital status:      Spouse name: N/A    Number of children: N/A    Years of education: N/A     Occupational History    Not on file.     Social History Main Topics    Smoking status: Never Smoker    Smokeless tobacco: Former User     Types: Chew    Alcohol use No    Drug use: No    Sexual activity: Yes     Partners: Female     Other Topics Concern    Not on file     Social History Narrative    No narrative on file     History reviewed. No pertinent surgical history.  Family History   Problem Relation Age of Onset    Heart disease Mother     Diabetes Father        Review of Systems   Constitutional: Negative for activity change, appetite change, fatigue and unexpected weight change.   HENT: Negative for congestion, sneezing and trouble swallowing.    Eyes: Negative for pain and visual disturbance.   Respiratory: Negative for cough and chest tightness.    Cardiovascular: Negative for leg swelling.   Gastrointestinal: Negative for abdominal distention, abdominal pain, blood in stool, constipation and diarrhea.   Endocrine: Negative for cold intolerance, heat intolerance, polydipsia, polyphagia and polyuria.   Genitourinary: Negative for dysuria, hematuria and scrotal swelling.   Musculoskeletal: Negative for arthralgias, back pain and gait problem.   Skin: Negative for pallor, rash and wound.   Allergic/Immunologic: Negative for environmental allergies, food allergies and  "immunocompromised state.   Neurological: Negative for dizziness, seizures, speech difficulty, light-headedness and numbness.   Hematological: Negative for adenopathy. Does not bruise/bleed easily.   Psychiatric/Behavioral: Negative for agitation, behavioral problems and confusion. The patient is not nervous/anxious.        Objective:       Vitals:    07/02/18 0926   BP: 110/73   Pulse: 85   Weight: 72.6 kg (160 lb)   Height: 5' 8" (1.727 m)     Physical Exam   Constitutional: He is oriented to person, place, and time. He appears well-developed.   HENT:   Head: Normocephalic and atraumatic.   Nose: Nose normal.   Mouth/Throat: Oropharynx is clear and moist. No oropharyngeal exudate.   Eyes: Conjunctivae and EOM are normal.   Neck: Normal range of motion. Neck supple. No JVD present. No tracheal deviation present. No thyromegaly present.   Cardiovascular: Normal rate, regular rhythm and normal heart sounds.  Exam reveals no gallop and no friction rub.    No murmur heard.  Pulses:       Dorsalis pedis pulses are 1+ on the right side, and 1+ on the left side.        Posterior tibial pulses are 1+ on the right side, and 1+ on the left side.   Pulmonary/Chest: Effort normal and breath sounds normal. No respiratory distress. He has no wheezes. He has no rales.   Abdominal: Soft. Bowel sounds are normal. He exhibits no distension. There is no tenderness.   Musculoskeletal: Normal range of motion.        Right foot: There is normal range of motion and no deformity.        Left foot: There is normal range of motion and no deformity.   Feet:   Right Foot:   Protective Sensation: 4 sites tested. 4 sites sensed.   Skin Integrity: Negative for ulcer or blister.   Left Foot:   Protective Sensation: 4 sites tested. 4 sites sensed.   Skin Integrity: Negative for ulcer or blister.   Neurological: He is alert and oriented to person, place, and time. He has normal reflexes.   Skin: Skin is warm and dry.   Psychiatric: He has a normal " mood and affect.   Nursing note and vitals reviewed.      Assessment:       1. Type 2 diabetes mellitus without complication, without long-term current use of insulin    2. Dyslipidemia    3. Calculus of bile duct without cholecystitis and without obstruction    4. Nephrolithiasis         Component      Latest Ref Rng & Units 6/26/2018   Specific Gravity, UA      1.005 - 1.030 >=1.030 (A)   pH, UA      5.0 - 7.5 5.0   Color, UA      Yellow Yellow   Clarity, UA      Clear Clear   Leukocytes, UA      Negative Negative   Protein, UA      Negative/Trace Negative   Glucose, UA      Negative 3+ (A)   Ketones, UA      Negative Negative   Occult Blood UA      Negative Negative   Bilirubin, UA      Negative Negative   Urobilinogen, UA      0.2 - 1.0 mg/dL 0.2   Nitrite, UA      Negative Negative   Microscopic Examination       Comment   Cholesterol      100 - 199 mg/dL 189   Triglycerides      0 - 149 mg/dL 81   HDL      >39 mg/dL 48   VLDL Cholesterol Blaze      5 - 40 mg/dL 16   LDL Calculated      0 - 99 mg/dL 125 (H)   Creatinine, Random Ur      Not Estab. mg/dL 57.1   Microalb, Ur      Not Estab. ug/mL 3.1   Microalb/Crt. Ratio      0.0 - 30.0 mg/g creat 5.4   Hemoglobin A1C      4.8 - 5.6 % 7.9 (H)   Hep C Virus Ab Signal/Cutoff      0.0 - 0.9 s/co ratio <0.1   IMPRESSION:  1. Cholelithiasis, with a 7 mm gallstone lodged in the gallbladder neck. No  other findings of acute cholecystitis.  2. Diffuse hepatic steatosis.    Read and electronically signed by: Can Mejia MD on 6/27/2018 7:26 AM CDT  Plan:           Type 2 diabetes mellitus without complication, without long-term current use of insulin  -     Hemoglobin A1c; Future; Expected date: 07/02/2018  -     Ambulatory referral to Nutrition Services    Dyslipidemia  -     Ambulatory referral to Nutrition Services    Calculus of bile duct without cholecystitis and without obstruction    Nephrolithiasis    His sugars are better but still on the elevated site with a  hemoglobin A1c of 7.9. He tries to watch his diet. I'm not sure if this is the time if we should be thinking about introducing insulin injections. The goal will be to his hemoglobin A1c less than 7.0 without incurring any risk for hypoglycemia.    At this point I'll encouraged to continue to watch his diet and notch up his exercise a little bit so that he can lose a few pounds of weight. (Of course weight loss is sometimes associated with worsening of cholelithiasis) I'll check his hemoglobin A1c again in 3-4 months time and see how he is doing.    In the meantime I had a discussion again about gallstones. While he is relatively asymptomatic at this point, there is no guarantee that he'll continue to be asymptomatic especially when the chips are down or he is out of town, or traveling in a  remote area.    Incidentally he is also known to have kidney stones and I advised him to keep hydrated. (The kidney stones are nonobstructive and an incidental finding on his CT scan in 2014) He should check with his family members about the gallbladder stones and kidney stones again and notify me next time.      Next visit, I will check his hemoglobin A1c again and then decide as to the future course of action.    He does plan to go to [place] for a cruise tour and he feels wants to consider a surgical evaluation prior to his cruise tour, I will be happy to arrange for the same.

## 2018-07-03 DIAGNOSIS — E11.9 TYPE 2 DIABETES MELLITUS WITHOUT COMPLICATION, WITHOUT LONG-TERM CURRENT USE OF INSULIN: ICD-10-CM

## 2018-07-12 DIAGNOSIS — E11.9 TYPE 2 DIABETES MELLITUS WITHOUT COMPLICATION, WITHOUT LONG-TERM CURRENT USE OF INSULIN: ICD-10-CM

## 2018-07-12 RX ORDER — RAMIPRIL 1.25 MG/1
CAPSULE ORAL
Qty: 40 CAPSULE | Refills: 3 | Status: SHIPPED | OUTPATIENT
Start: 2018-07-12 | End: 2019-08-06 | Stop reason: SDUPTHER

## 2018-07-24 DIAGNOSIS — E11.9 TYPE 2 DIABETES MELLITUS WITHOUT COMPLICATION, WITHOUT LONG-TERM CURRENT USE OF INSULIN: ICD-10-CM

## 2018-10-23 ENCOUNTER — TELEPHONE (OUTPATIENT)
Dept: FAMILY MEDICINE | Facility: CLINIC | Age: 53
End: 2018-10-23

## 2018-10-23 LAB — HBA1C MFR BLD: 6.4 % (ref 4.8–5.6)

## 2018-10-23 NOTE — TELEPHONE ENCOUNTER
----- Message from Alan Stewart MD sent at 10/23/2018  8:01 AM CDT -----  Notify patient that his hemoglobin A1c 6.4 which is better than before. Keep regular follow-up.

## 2018-11-13 ENCOUNTER — OFFICE VISIT (OUTPATIENT)
Dept: FAMILY MEDICINE | Facility: CLINIC | Age: 53
End: 2018-11-13
Payer: COMMERCIAL

## 2018-11-13 VITALS
HEART RATE: 81 BPM | HEIGHT: 68 IN | SYSTOLIC BLOOD PRESSURE: 128 MMHG | WEIGHT: 161 LBS | DIASTOLIC BLOOD PRESSURE: 75 MMHG | BODY MASS INDEX: 24.4 KG/M2

## 2018-11-13 DIAGNOSIS — E11.9 TYPE 2 DIABETES MELLITUS WITHOUT COMPLICATION, WITHOUT LONG-TERM CURRENT USE OF INSULIN: Primary | ICD-10-CM

## 2018-11-13 DIAGNOSIS — R30.0 DYSURIA: ICD-10-CM

## 2018-11-13 DIAGNOSIS — E78.5 DYSLIPIDEMIA: ICD-10-CM

## 2018-11-13 PROCEDURE — 3008F BODY MASS INDEX DOCD: CPT | Mod: ,,, | Performed by: INTERNAL MEDICINE

## 2018-11-13 PROCEDURE — 99213 OFFICE O/P EST LOW 20 MIN: CPT | Mod: ,,, | Performed by: INTERNAL MEDICINE

## 2018-11-13 NOTE — PROGRESS NOTES
Subjective:       Patient ID: Pedro Luis Pérez is a 53 y.o. male.    Chief Complaint: Diabetes (lab review ) and Hyperlipidemia    Mr. Pérez is a 53-year-old  gentleman who comes for follow-up. He has underlying diabetes and dyslipidemia. Currently he is taking Janumet and he was also prescribed Jardiance because of elevated hemoglobin A1c.    He has started following a keto diet and his blood sugars are extremely better at this point. His recent hemoglobin A1c 6.4 and he wants to cut down the Jardiance to perhaps 3 times a week.    While Jardiance is helping him he feels that he might be experiencing some soreness in the lower part of the abdomen. He has not had any urinary tract infection so far but he feels like that he may have one.      Diabetes   He presents for his follow-up diabetic visit. He has type 2 diabetes mellitus. His disease course has been improving. Pertinent negatives for hypoglycemia include no dizziness, nervousness/anxiousness or pallor. Pertinent negatives for diabetes include no fatigue, no foot ulcerations, no polydipsia, no polyphagia, no polyuria, no visual change and no weight loss. Symptoms are improving. Risk factors for coronary artery disease include male sex, diabetes mellitus and dyslipidemia. Current diabetic treatment includes oral agent (triple therapy) (Janumet and Jardiance). He is compliant with treatment most of the time. An ACE inhibitor/angiotensin II receptor blocker is being taken. He does not see a podiatrist.Eye exam is current.   Hyperlipidemia   This is a chronic problem. The current episode started more than 1 year ago. He has no history of obesity. Treatments tried: was rxed statin- not taking.       Past Medical History:   Diagnosis Date    Anxiety     Diabetes mellitus, type 2      Social History     Socioeconomic History    Marital status:      Spouse name: Not on file    Number of children: Not on file    Years of education: Not on file     Highest education level: Not on file   Social Needs    Financial resource strain: Not on file    Food insecurity - worry: Not on file    Food insecurity - inability: Not on file    Transportation needs - medical: Not on file    Transportation needs - non-medical: Not on file   Occupational History    Not on file   Tobacco Use    Smoking status: Never Smoker    Smokeless tobacco: Former User     Types: Chew   Substance and Sexual Activity    Alcohol use: No    Drug use: No    Sexual activity: Yes     Partners: Female   Other Topics Concern    Not on file   Social History Narrative    Not on file     History reviewed. No pertinent surgical history.  Family History   Problem Relation Age of Onset    Heart disease Mother     Diabetes Father        Review of Systems   Constitutional: Negative for activity change, appetite change, fatigue, unexpected weight change and weight loss.   HENT: Negative for congestion, sneezing and trouble swallowing.    Eyes: Negative for pain and visual disturbance.   Respiratory: Negative for cough and chest tightness.    Cardiovascular: Negative for leg swelling.        Patient is on ACE inhibitor to protect his kidneys. He has normal blood pressure otherwise.   Gastrointestinal: Negative for abdominal distention, abdominal pain, blood in stool, constipation and diarrhea.   Endocrine: Negative for cold intolerance, heat intolerance, polydipsia, polyphagia and polyuria.        Patient has mild dyslipidemia. Patient also has diabetes mellitus type 2.   Genitourinary: Negative for dysuria, hematuria and scrotal swelling.   Musculoskeletal: Negative for arthralgias, back pain and gait problem.   Skin: Negative for pallor, rash and wound.   Allergic/Immunologic: Negative for environmental allergies, food allergies and immunocompromised state.   Neurological: Negative for dizziness, light-headedness and numbness.   Hematological: Negative for adenopathy. Does not bruise/bleed  "easily.   Psychiatric/Behavioral: The patient is not nervous/anxious.          Objective:      Blood pressure 128/75, pulse 81, height 5' 8" (1.727 m), weight 73 kg (161 lb). Body mass index is 24.48 kg/m².  Physical Exam   Constitutional: He is oriented to person, place, and time. He appears well-developed.   HENT:   Head: Normocephalic and atraumatic.   Nose: Nose normal.   Mouth/Throat: Oropharynx is clear and moist. No oropharyngeal exudate.   Eyes: Conjunctivae and EOM are normal.   Neck: Normal range of motion. Neck supple. No JVD present. No tracheal deviation present. No thyromegaly present.   Cardiovascular: Normal rate, regular rhythm and normal heart sounds. Exam reveals no gallop and no friction rub.   No murmur heard.  Pulses:       Dorsalis pedis pulses are 1+ on the right side, and 1+ on the left side.        Posterior tibial pulses are 1+ on the right side, and 1+ on the left side.   Pulmonary/Chest: Effort normal and breath sounds normal. No respiratory distress. He has no wheezes. He has no rales.   Abdominal: Soft. Bowel sounds are normal. He exhibits no distension. There is no tenderness.   Musculoskeletal: Normal range of motion.        Right foot: There is normal range of motion and no deformity.        Left foot: There is normal range of motion and no deformity.   Feet:   Right Foot:   Protective Sensation: 4 sites tested. 4 sites sensed.   Skin Integrity: Negative for ulcer or blister.   Left Foot:   Protective Sensation: 4 sites tested. 4 sites sensed.   Skin Integrity: Negative for ulcer or blister.   Neurological: He is alert and oriented to person, place, and time. He has normal reflexes.   Skin: Skin is warm and dry.   Psychiatric: He has a normal mood and affect.   Nursing note and vitals reviewed.        Assessment:       1. Type 2 diabetes mellitus without complication, without long-term current use of insulin    2. Dyslipidemia    3. Dysuria           No visits with results within 3 " Month(s) from this visit.   Latest known visit with results is:   Office Visit on 07/02/2018   Component Date Value Ref Range Status    Hemoglobin A1C 10/22/2018 6.4* 4.8 - 5.6 % Final         Plan:           Type 2 diabetes mellitus without complication, without long-term current use of insulin  -     Microalbumin/creatinine urine ratio; Future; Expected date: 11/13/2018  -     Hemoglobin A1c; Future; Expected date: 11/13/2018  -     empagliflozin (JARDIANCE) 10 mg Tab; Take 10 mg by mouth every Mon, Wed, Fri. Dosage reduced to 3 times a week as per patient request because of improvement in sugars.  Dispense: 39 tablet; Refill: 3    Dyslipidemia  -     Comprehensive metabolic panel; Future; Expected date: 11/13/2018  -     Lipid panel; Future; Expected date: 11/13/2018    Dysuria  -     Urinalysis; Future    Patient is showing improvement in his hemoglobin A1c. He is following to keep to diet. His hemoglobin A1c has shown remarkable improvement and it was 6.4 at this point.    He wants to cut down on Jardiance to 3 times a week. I do not have a problem with that.    He'll continue with his other medications. He has not started statin medications as yet since he believes he may have naturally controlled his cholesterol levels.    I'll check labs at follow-up in 3-4 months time and see how he is progressing.    He declines influenza vaccination for 2018/19        Current Outpatient Medications:     aspirin (ECOTRIN) 81 MG EC tablet, Take 1 tablet (81 mg total) by mouth Daily., Disp: 100 tablet, Rfl: 1    atorvastatin (LIPITOR) 10 MG tablet, 1/2 tablet three times a week at bedtime. Dose reduced due to side effects at higher dose, Disp: 18 tablet, Rfl: 3    ramipril (ALTACE) 1.25 MG capsule, On Monday, Wednesday and Friday AM, Disp: 40 capsule, Rfl: 3    SITagliptan-metformin (JANUMET XR) 100-1,000 mg TM24, Take 1 tablet by mouth Daily., Disp: 90 tablet, Rfl: 2    [START ON 11/14/2018] empagliflozin (JARDIANCE)  10 mg Tab, Take 10 mg by mouth every Mon, Wed, Fri. Dosage reduced to 3 times a week as per patient request because of improvement in sugars., Disp: 39 tablet, Rfl: 3

## 2018-11-13 NOTE — PATIENT INSTRUCTIONS
Using a Blood Sugar Log    You have diabetes. This means your body has trouble regulating a sugar called glucose. To help manage your diabetes, youll need to check your blood sugar level as directed by your healthcare provider. Keeping a log of your blood sugar levels will help you track your blood sugar readings. Its a simple and easy way to see how well you are controlling your diabetes.  Checking your blood sugar level  You can check your blood sugar level with a blood glucose meter. Youll first prick the side of your finger with a tiny lancet to draw a tiny drop of blood onto the test strip. Some glucose meters let you use another place on your body to test. But these other places should not be used in some cases as they may be inaccurate. Follow the instructions for your glucose meter. And talk with your healthcare provider before doing the test on other places.  The strip goes into the meter first, then a drop of blood is placed on the tip of the strip. The meter then shows a reading that tells you the level of your blood sugar. Your readings should be in your target range as often as possible. This means not too high or too low. Staying in this range helps lower your risk for complications. Your healthcare provider will help you figure out the target range that is best for you.  Tracking your readings  Every time you check your blood sugar, use your log to keep track of your readings. Your meter will also probably have a memory feature that your healthcare provider can check at your next visit. You may be advised by your healthcare provider to check your blood sugar in the morning, at bedtime, and before and after meals. Be sure to write down all of your numbers. Also use your log to record things that might have affected your blood sugar. Some examples include being sick, certain medicines, being physically active, feeling stressed, or skipping meals.   Lessons learned from your readings  Tracking your  blood sugar readings helps you see patterns. These patterns tell you how your actions affect your blood sugar. For instance, you may have higher numbers after eating certain foods or lower numbers after exercise. They just help you understand how to stay in your target range more often, so that your diabetes remains in good control.  Sharing your log with your healthcare team  Bring your blood sugar log and glucose meter with you to all of your healthcare appointments. This can help your healthcare team make changes to your treatment plan, if needed. This may involve making changes in what you eat, what medicines you take, or how much you exercise.  To learn more  The resources below can help you learn more:  · American Diabetes Association 966-654-0555 www.diabetes.org  · [employer] 074-414-2386 www.lighthouse.org  · [employer] [place] 235-268-1872 www.nei.nih.gov  · [employer] 383-219-0102 www.hormone.org  Date Last Reviewed: 5/1/2016  © 9902-4655 [employer]. [address] [place] [address], [place] [address]. All rights reserved. This information is not intended as a substitute for professional medical care. Always follow your healthcare professional's instructions.

## 2019-04-25 ENCOUNTER — TELEPHONE (OUTPATIENT)
Dept: FAMILY MEDICINE | Facility: CLINIC | Age: 54
End: 2019-04-25

## 2019-04-25 LAB
ALBUMIN SERPL-MCNC: 4.7 G/DL (ref 3.5–5.5)
ALBUMIN/CREAT UR: 6.9 MG/G CREAT (ref 0–30)
ALBUMIN/GLOB SERPL: 2.4 {RATIO} (ref 1.2–2.2)
ALP SERPL-CCNC: 57 IU/L (ref 39–117)
ALT SERPL-CCNC: 17 IU/L (ref 0–44)
APPEARANCE UR: CLEAR
AST SERPL-CCNC: 20 IU/L (ref 0–40)
BILIRUB SERPL-MCNC: 0.8 MG/DL (ref 0–1.2)
BILIRUB UR QL STRIP: NEGATIVE
BUN SERPL-MCNC: 14 MG/DL (ref 6–24)
BUN/CREAT SERPL: 16 (ref 9–20)
CALCIUM SERPL-MCNC: 8.9 MG/DL (ref 8.7–10.2)
CHLORIDE SERPL-SCNC: 105 MMOL/L (ref 96–106)
CHOLEST SERPL-MCNC: 190 MG/DL (ref 100–199)
CO2 SERPL-SCNC: 21 MMOL/L (ref 20–29)
COLOR UR: YELLOW
CREAT SERPL-MCNC: 0.85 MG/DL (ref 0.76–1.27)
CREAT UR-MCNC: 84.6 MG/DL
GLOBULIN SER CALC-MCNC: 2 G/DL (ref 1.5–4.5)
GLUCOSE SERPL-MCNC: 162 MG/DL (ref 65–99)
GLUCOSE UR QL: ABNORMAL
HBA1C MFR BLD: 7.3 % (ref 4.8–5.6)
HDLC SERPL-MCNC: 38 MG/DL
HGB UR QL STRIP: NEGATIVE
KETONES UR QL STRIP: ABNORMAL
LDLC SERPL CALC-MCNC: 135 MG/DL (ref 0–99)
LEUKOCYTE ESTERASE UR QL STRIP: NEGATIVE
MICRO URNS: ABNORMAL
MICROALBUMIN UR-MCNC: 5.8 UG/ML
NITRITE UR QL STRIP: NEGATIVE
PH UR STRIP: 5 [PH] (ref 5–7.5)
POTASSIUM SERPL-SCNC: 4.6 MMOL/L (ref 3.5–5.2)
PROT SERPL-MCNC: 6.7 G/DL (ref 6–8.5)
PROT UR QL STRIP: NEGATIVE
SODIUM SERPL-SCNC: 141 MMOL/L (ref 134–144)
SP GR UR: >=1.03 (ref 1–1.03)
TRIGL SERPL-MCNC: 84 MG/DL (ref 0–149)
UROBILINOGEN UR STRIP-MCNC: 0.2 MG/DL (ref 0.2–1)
VLDLC SERPL CALC-MCNC: 17 MG/DL (ref 5–40)

## 2019-04-25 NOTE — PROGRESS NOTES
Notify patient that his blood sugars are somewhat elevated as compared to past. Keep follow-up for further discussion and management.

## 2019-04-25 NOTE — TELEPHONE ENCOUNTER
----- Message from Alan Stewart MD sent at 4/25/2019  4:15 PM CDT -----  Notify patient that his blood sugars are somewhat elevated as compared to past. Keep follow-up for further discussion and management.

## 2019-05-09 DIAGNOSIS — E78.5 DYSLIPIDEMIA: ICD-10-CM

## 2019-05-10 RX ORDER — ATORVASTATIN CALCIUM 10 MG/1
TABLET, FILM COATED ORAL
Qty: 18 TABLET | Refills: 5 | Status: SHIPPED | OUTPATIENT
Start: 2019-05-10 | End: 2019-05-27 | Stop reason: SDUPTHER

## 2019-05-27 ENCOUNTER — OFFICE VISIT (OUTPATIENT)
Dept: FAMILY MEDICINE | Facility: CLINIC | Age: 54
End: 2019-05-27
Payer: COMMERCIAL

## 2019-05-27 VITALS
WEIGHT: 161 LBS | DIASTOLIC BLOOD PRESSURE: 80 MMHG | HEIGHT: 68 IN | RESPIRATION RATE: 16 BRPM | SYSTOLIC BLOOD PRESSURE: 119 MMHG | BODY MASS INDEX: 24.4 KG/M2 | HEART RATE: 78 BPM

## 2019-05-27 DIAGNOSIS — E78.5 DYSLIPIDEMIA: ICD-10-CM

## 2019-05-27 DIAGNOSIS — E11.9 TYPE 2 DIABETES MELLITUS WITHOUT COMPLICATION, WITHOUT LONG-TERM CURRENT USE OF INSULIN: Primary | ICD-10-CM

## 2019-05-27 DIAGNOSIS — K80.50 CALCULUS OF BILE DUCT WITHOUT CHOLECYSTITIS AND WITHOUT OBSTRUCTION: ICD-10-CM

## 2019-05-27 DIAGNOSIS — R10.11 RIGHT UPPER QUADRANT ABDOMINAL PAIN: ICD-10-CM

## 2019-05-27 PROCEDURE — 99214 OFFICE O/P EST MOD 30 MIN: CPT | Mod: ,,, | Performed by: INTERNAL MEDICINE

## 2019-05-27 PROCEDURE — 99214 PR OFFICE/OUTPT VISIT, EST, LEVL IV, 30-39 MIN: ICD-10-PCS | Mod: ,,, | Performed by: INTERNAL MEDICINE

## 2019-05-27 RX ORDER — ATORVASTATIN CALCIUM 10 MG/1
TABLET, FILM COATED ORAL
Qty: 18 TABLET | Refills: 5 | Status: SHIPPED | OUTPATIENT
Start: 2019-05-27 | End: 2019-09-10 | Stop reason: SDUPTHER

## 2019-05-27 NOTE — PATIENT INSTRUCTIONS
Using a Blood Sugar Log    You have diabetes. This means your body has trouble regulating a sugar called glucose. To help manage your diabetes, youll need to check your blood sugar level as directed by your healthcare provider. Keeping a log of your blood sugar levels will help you track your blood sugar readings. Its a simple and easy way to see how well you are controlling your diabetes.  Checking your blood sugar level  You can check your blood sugar level with a blood glucose meter. Youll first prick the side of your finger with a tiny lancet to draw a tiny drop of blood onto the test strip. Some glucose meters let you use another place on your body to test. But these other places should not be used in some cases as they may be inaccurate. Follow the instructions for your glucose meter. And talk with your healthcare provider before doing the test on other places.  The strip goes into the meter first, then a drop of blood is placed on the tip of the strip. The meter then shows a reading that tells you the level of your blood sugar. Your readings should be in your target range as often as possible. This means not too high or too low. Staying in this range helps lower your risk for complications. Your healthcare provider will help you figure out the target range that is best for you.  Tracking your readings  Every time you check your blood sugar, use your log to keep track of your readings. Your meter will also probably have a memory feature that your healthcare provider can check at your next visit. You may be advised by your healthcare provider to check your blood sugar in the morning, at bedtime, and before and after meals. Be sure to write down all of your numbers. Also use your log to record things that might have affected your blood sugar. Some examples include being sick, certain medicines, being physically active, feeling stressed, or skipping meals.   Lessons learned from your readings  Tracking your  blood sugar readings helps you see patterns. These patterns tell you how your actions affect your blood sugar. For instance, you may have higher numbers after eating certain foods or lower numbers after exercise. They just help you understand how to stay in your target range more often, so that your diabetes remains in good control.  Sharing your log with your healthcare team  Bring your blood sugar log and glucose meter with you to all of your healthcare appointments. This can help your healthcare team make changes to your treatment plan, if needed. This may involve making changes in what you eat, what medicines you take, or how much you exercise.  To learn more  The resources below can help you learn more:  · American Diabetes Association 203-998-0472 www.diabetes.org  · [employer] 626-577-0671 www.lighthouse.org  · [employer] [facility] [place] 526-676-6228 www.nei.nih.gov  · [facility] 829-816-5728 www.hormone.org  Date Last Reviewed: 5/1/2016 © 2000-2017 [employer]. [address] [place] [address]. All rights reserved. This information is not intended as a substitute for professional medical care. Always follow your healthcare professional's instructions.        Medicine for Cholesterol Control  Cholesterol is a waxy substance in your bloodstream. If there is too much of it in your blood, it can build up in the walls of your arteries. Over time, this buildup can lead to coronary disease. Coronary disease can put you at risk for a heart attack or stroke. It can also put you at risk for disease of the arteries in your legs and other places in your body. Medicine can give you the extra help you need to control your cholesterol.    How medicine helps  Different kinds of medicines help with cholesterol levels. Some help lower your LDL (bad cholesterol). Some help raise your HDL (good cholesterol). Other medicines lower your triglyceride levels. And some do all three. It  may take some time to find the right medicine for you. Taking medicine will be only one part of your cholesterol control plan. You will still need to eat right and get regular exercise.  Talk with your healthcare provider to find out your risks for having a heart attack. Your provider can tell you what goals to use to see if your treatment is working. These goals may vary based on your health issues or family history. Also ask your provider how often your cholesterol should be checked as part of your treatment plan. You may need to fast before getting your cholesterol checked.  Taking your medicine  It is important to:  · Tell your healthcare provider about any other medicines you take. This includes over-the-counter medicines. It also includes vitamins and herbs.  · Take your medicine exactly as directed. This helps make sure that it works as it should.  · Don't skip a dose.  · Don't stop taking it if you feel better.  · Don't stop taking it when your cholesterol numbers improve.  · Order your refill before your medicine runs out.  Side effects  Medicines can cause side effects. These often occur at the start of taking a new medicine. Side effects can include headache and upset stomach. Rarely you can have muscle aches. Tell your healthcare provider about any side effects you have.  When to call your healthcare provider  When taking your medicine, let your healthcare provider know if you have:  · Yellowing of the whites of eyes  · Blurred vision  · Muscle aches  · Trouble breathing   High-risk groups  Some people may need to take medicines called statins to control their cholesterol. This is in addition to eating a healthy diet and getting regular exercise.  Statins can help you stay healthy. They can also help prevent a heart attack or stroke. You may need to take a statin if you are in one of these groups:  · Adults who have had a heart attack or stroke. Or adults who have had peripheral vascular disease, a  ministroke (transient ischemic attack), or stable or unstable angina. This group also includes people who have had a procedure to restore blood flow through a blocked artery. These procedures include percutaneous coronary intervention, angioplasty, stent, and open-heart bypass surgery.  · Adults who have diabetes. Or adults who are at higher risk of having a heart attack or stroke and have an LDL cholesterol level of 70 to 189 mg/dL  · Adults who are 21 years old or older and have an LDL cholesterol level of 190 mg/dL or higher.  If you are in a high-risk group, talk with your healthcare provider about your treatment goals. Make sure you understand why these goals are important, based on your own health history and your family history of heart disease or high cholesterol.  Make a plan to have regular cholesterol checks. You may need to fast before getting this test. Also ask your provider about any side effects your medicines may cause. Let your provider know about any side effects you have. You may need to take more than one medicine to reach the cholesterol goals that you and your provider decide on.  Date Last Reviewed: 10/1/2016  © 8727-4442 [employer]. [address] [place] [address], [place] [address]. All rights reserved. This information is not intended as a substitute for professional medical care. Always follow your healthcare professional's instructions.

## 2019-05-27 NOTE — PROGRESS NOTES
Subjective:       Patient ID: Pedro Luis Pérez is a 53 y.o. male.    Chief Complaint: Diabetes (lab review ); Hyperlipidemia (not taking med ); and Abdominal Pain    Mr. Jeramie Pérez is a pleasant 53-year-old  gentleman who comes for follow-up. Underlying medical issues include type 2 diabetes currently on combination of Januvia and metformin and also on Jardiance. On one of he has tried to cut down the medications but with dorsal positive results. His recent hemoglobin A1c has gone up greater than 7.    On and off he has stopped Lipitor because of intolerance and his LDL cholesterol level also has gone up.    He has been experiencing intermittent right upper quadrant abdominal pain. He is known to have gallbladder stone and indeed in 2014 he had seen Dr. Gibran Khan for the same and at that point he was incidentally discovered to have gallbladder stone. He was asymptomatic at that time. Please note that he also has kidney stones. There is a strong family history of gallbladder stones also told the parents.    At this point, he would like to get a surgical opinion because the pain can be bothersome sometimes.    Diabetes   He presents for his follow-up diabetic visit. He has type 2 diabetes mellitus. His disease course has been worsening. Pertinent negatives for hypoglycemia include no dizziness, mood changes, nervousness/anxiousness, pallor or seizures. Pertinent negatives for diabetes include no blurred vision, no fatigue, no foot ulcerations, no polydipsia, no polyphagia, no polyuria and no weight loss. Symptoms are worsening. Risk factors for coronary artery disease include male sex, dyslipidemia and diabetes mellitus. Current diabetic treatment includes oral agent (triple therapy). He is compliant with treatment some of the time. He has not had a previous visit with a dietitian. He participates in exercise intermittently. His lunch blood glucose range is generally 140-180 mg/dl. An ACE  inhibitor/angiotensin II receptor blocker is being taken. He does not see a podiatrist.Eye exam is current.   Hyperlipidemia   This is a chronic problem. The current episode started more than 1 year ago. The problem is controlled. He has no history of hypothyroidism or obesity. Pertinent negatives include no myalgias. He is currently on no antihyperlipidemic treatment (stopped statins). Risk factors for coronary artery disease include male sex, dyslipidemia and diabetes mellitus.   Abdominal Pain   This is a chronic problem. The current episode started more than 1 month ago. The onset quality is undetermined. The problem occurs intermittently. The problem has been waxing and waning. The pain is located in the RUQ. The pain is at a severity of 6/10. The pain is moderate. The quality of the pain is colicky and cramping. The abdominal pain does not radiate. Pertinent negatives include no arthralgias, constipation, diarrhea, dysuria, fever, flatus, frequency, hematuria, melena, myalgias or weight loss. His past medical history is significant for gallstones. There is no history of pancreatitis or ulcerative colitis. Patient's medical history includes kidney stones.       Past Medical History:   Diagnosis Date    Anxiety     Diabetes mellitus, type 2      Social History     Socioeconomic History    Marital status:      Spouse name: Regla    Number of children: 4    Years of education: Not on file    Highest education level: Not on file   Occupational History    Occupation: Self Employed- [employer]   Social Needs    Financial resource strain: Not on file    Food insecurity:     Worry: Not on file     Inability: Not on file    Transportation needs:     Medical: Not on file     Non-medical: Not on file   Tobacco Use    Smoking status: Never Smoker    Smokeless tobacco: Former User     Types: Chew   Substance and Sexual Activity    Alcohol use: No    Drug use: No    Sexual activity: Yes     Partners:  Female   Lifestyle    Physical activity:     Days per week: Not on file     Minutes per session: Not on file    Stress: Not at all   Relationships    Social connections:     Talks on phone: Not on file     Gets together: Not on file     Attends Alevism service: Not on file     Active member of club or organization: Not on file     Attends meetings of clubs or organizations: Not on file     Relationship status: Not on file   Other Topics Concern    Not on file   Social History Narrative    Not on file     History reviewed. No pertinent surgical history.  Family History   Problem Relation Age of Onset    Heart disease Mother     Diabetes Father        Review of Systems   Constitutional: Negative for activity change, appetite change, fatigue, fever, unexpected weight change and weight loss.   HENT: Negative for congestion, sneezing and trouble swallowing.    Eyes: Negative for blurred vision, pain and visual disturbance.   Respiratory: Negative for cough and chest tightness.    Cardiovascular: Negative for leg swelling.        Patient is on ACE inhibitor to protect his kidneys. He has normal blood pressure otherwise.   Gastrointestinal: Negative for abdominal distention, abdominal pain, blood in stool, constipation, diarrhea, flatus and melena.   Endocrine: Negative for cold intolerance, heat intolerance, polydipsia, polyphagia and polyuria.        Patient has mild dyslipidemia. Patient also has diabetes mellitus type 2.   Genitourinary: Negative for dysuria, frequency, hematuria and scrotal swelling.   Musculoskeletal: Negative for arthralgias, back pain, gait problem and myalgias.   Skin: Negative for pallor, rash and wound.   Allergic/Immunologic: Negative for environmental allergies, food allergies and immunocompromised state.   Neurological: Negative for dizziness, seizures, light-headedness and numbness.   Hematological: Negative for adenopathy. Does not bruise/bleed easily.   Psychiatric/Behavioral: The  "patient is not nervous/anxious.          Objective:      Blood pressure 119/80, pulse 78, resp. rate 16, height 5' 8" (1.727 m), weight 73 kg (161 lb). Body mass index is 24.48 kg/m².  Physical Exam   Constitutional: He is oriented to person, place, and time. He appears well-developed.   HENT:   Head: Normocephalic and atraumatic.   Nose: Nose normal.   Mouth/Throat: Oropharynx is clear and moist. No oropharyngeal exudate.   Eyes: Conjunctivae and EOM are normal.   Neck: Normal range of motion. Neck supple. No JVD present. No tracheal deviation present. No thyromegaly present.   Cardiovascular: Normal rate, regular rhythm and normal heart sounds. Exam reveals no gallop and no friction rub.   No murmur heard.  Pulses:       Dorsalis pedis pulses are 1+ on the right side, and 1+ on the left side.        Posterior tibial pulses are 1+ on the right side, and 1+ on the left side.   Pulmonary/Chest: Effort normal and breath sounds normal. No respiratory distress. He has no wheezes. He has no rales.   Abdominal: Soft. Bowel sounds are normal. He exhibits no distension. There is no tenderness.   Musculoskeletal: Normal range of motion.        Right foot: There is normal range of motion and no deformity.        Left foot: There is normal range of motion and no deformity.   Feet:   Right Foot:   Protective Sensation: 4 sites tested. 4 sites sensed.   Skin Integrity: Negative for ulcer or blister.   Left Foot:   Protective Sensation: 4 sites tested. 4 sites sensed.   Skin Integrity: Negative for ulcer or blister.   Neurological: He is alert and oriented to person, place, and time. He has normal reflexes.   Skin: Skin is warm and dry.   Psychiatric: He has a normal mood and affect.   Nursing note and vitals reviewed.        Assessment:       1. Type 2 diabetes mellitus without complication, without long-term current use of insulin    2. Dyslipidemia    3. Calculus of bile duct without cholecystitis and without obstruction    4. " Right upper quadrant abdominal pain           No visits with results within 3 Month(s) from this visit.   Latest known visit with results is:   Office Visit on 11/13/2018   Component Date Value Ref Range Status    Glucose 04/24/2019 162* 65 - 99 mg/dL Final    BUN, Bld 04/24/2019 14  6 - 24 mg/dL Final    Creatinine 04/24/2019 0.85  0.76 - 1.27 mg/dL Final    eGFR if non African American 04/24/2019 98  >59 mL/min/1.73 Final    eGFR if  04/24/2019 113  >59 mL/min/1.73 Final    BUN/Creatinine Ratio 04/24/2019 16  9 - 20 Final    Sodium 04/24/2019 141  134 - 144 mmol/L Final    Potassium 04/24/2019 4.6  3.5 - 5.2 mmol/L Final    Chloride 04/24/2019 105  96 - 106 mmol/L Final    CO2 04/24/2019 21  20 - 29 mmol/L Final    Calcium 04/24/2019 8.9  8.7 - 10.2 mg/dL Final    Total Protein 04/24/2019 6.7  6.0 - 8.5 g/dL Final    Albumin 04/24/2019 4.7  3.5 - 5.5 g/dL Final    Globulin, Total 04/24/2019 2.0  1.5 - 4.5 g/dL Final    Albumin/Globulin Ratio 04/24/2019 2.4* 1.2 - 2.2 Final    Total Bilirubin 04/24/2019 0.8  0.0 - 1.2 mg/dL Final    Alkaline Phosphatase 04/24/2019 57  39 - 117 IU/L Final    AST 04/24/2019 20  0 - 40 IU/L Final    ALT 04/24/2019 17  0 - 44 IU/L Final    Cholesterol 04/24/2019 190  100 - 199 mg/dL Final    Triglycerides 04/24/2019 84  0 - 149 mg/dL Final    HDL 04/24/2019 38* >39 mg/dL Final    VLDL Cholesterol Blaze 04/24/2019 17  5 - 40 mg/dL Final    LDL Calculated 04/24/2019 135* 0 - 99 mg/dL Final    Creatinine, Random Ur 04/24/2019 84.6  Not Estab. mg/dL Final    Microalb, Ur 04/24/2019 5.8  Not Estab. ug/mL Final    Microalb/Crt. Ratio 04/24/2019 6.9  0.0 - 30.0 mg/g creat Final    Hemoglobin A1C 04/24/2019 7.3* 4.8 - 5.6 % Final    Specific Gravity, UA 04/24/2019 >=1.030* 1.005 - 1.030 Final    pH, UA 04/24/2019 5.0  5.0 - 7.5 Final    Color, UA 04/24/2019 Yellow  Yellow Final    Clarity, UA 04/24/2019 Clear  Clear Final    Leukocytes, UA  04/24/2019 Negative  Negative Final    Protein, UA 04/24/2019 Negative  Negative/Trace Final    Glucose, UA 04/24/2019 3+* Negative Final    Ketones, UA 04/24/2019 1+* Negative Final    Occult Blood UA 04/24/2019 Negative  Negative Final    Bilirubin, UA 04/24/2019 Negative  Negative Final    Urobilinogen, UA 04/24/2019 0.2  0.2 - 1.0 mg/dL Final    Nitrite, UA 04/24/2019 Negative  Negative Final    Microscopic Examination 04/24/2019 Comment   Final         Plan:           Type 2 diabetes mellitus without complication, without long-term current use of insulin  -     Hemoglobin A1c; Future; Expected date: 05/27/2019    Dyslipidemia  -     Lipid panel; Future; Expected date: 05/27/2019  -     atorvastatin (LIPITOR) 10 MG tablet; TAKE 1/2 TABLET BY MOUTH THREE TIMES A WEEK AT BEDTIME.  Dispense: 18 tablet; Refill: 5    Calculus of bile duct without cholecystitis and without obstruction  -     Ambulatory referral to General Surgery    Right upper quadrant abdominal pain  -     Ambulatory referral to General Surgery      Mr. Pérez agrees to start on cholesterol medication again and he'll be compliant to his medications. I will check a hemoglobin A1c and lipid panel again in 4 months time.    In the meantime, I will give her a referral to Dr. Gibran Khan for evaluation of his gallbladder stone. Please note that the previous ultrasound had shown to start to wean the prior duct and will see both Dr. Khan decides about that.    Fup 4 months    Spent more than 25 minutes with patient which involved review of his medical conditions, labs, medications and with 50% of time face-to-face discussion about medical problems, management and any applicable changes.      Current Outpatient Medications:     aspirin (ECOTRIN) 81 MG EC tablet, Take 1 tablet (81 mg total) by mouth Daily., Disp: 100 tablet, Rfl: 1    empagliflozin (JARDIANCE) 10 mg Tab, Take 10 mg by mouth every Mon, Wed, Fri. Dosage reduced to 3 times a week  as per patient request because of improvement in sugars., Disp: 39 tablet, Rfl: 3    ramipril (ALTACE) 1.25 MG capsule, On Monday, Wednesday and Friday AM, Disp: 40 capsule, Rfl: 3    SITagliptan-metformin (JANUMET XR) 100-1,000 mg TM24, Take 1 tablet by mouth Daily., Disp: 90 tablet, Rfl: 2    atorvastatin (LIPITOR) 10 MG tablet, TAKE 1/2 TABLET BY MOUTH THREE TIMES A WEEK AT BEDTIME., Disp: 18 tablet, Rfl: 5

## 2019-06-19 ENCOUNTER — OFFICE VISIT (OUTPATIENT)
Dept: SURGERY | Facility: CLINIC | Age: 54
End: 2019-06-19
Payer: COMMERCIAL

## 2019-06-19 VITALS
HEIGHT: 68 IN | BODY MASS INDEX: 24.4 KG/M2 | WEIGHT: 161 LBS | SYSTOLIC BLOOD PRESSURE: 113 MMHG | HEART RATE: 85 BPM | DIASTOLIC BLOOD PRESSURE: 75 MMHG | TEMPERATURE: 98 F

## 2019-06-19 DIAGNOSIS — K80.10 CHRONIC CALCULOUS CHOLECYSTITIS: Primary | ICD-10-CM

## 2019-06-19 PROCEDURE — 3008F PR BODY MASS INDEX (BMI) DOCUMENTED: ICD-10-PCS | Mod: ,,, | Performed by: SURGERY

## 2019-06-19 PROCEDURE — 99213 PR OFFICE/OUTPT VISIT, EST, LEVL III, 20-29 MIN: ICD-10-PCS | Mod: ,,, | Performed by: SURGERY

## 2019-06-19 PROCEDURE — 99213 OFFICE O/P EST LOW 20 MIN: CPT | Mod: ,,, | Performed by: SURGERY

## 2019-06-19 PROCEDURE — 3008F BODY MASS INDEX DOCD: CPT | Mod: ,,, | Performed by: SURGERY

## 2019-06-19 NOTE — PROGRESS NOTES
Subjective:       Patient ID: Pedro Luis Pérez is a 53 y.o. male.    Chief Complaint: Consult (Eval gallbladder )      HPI:  Patient is 53-year-old male who returns to the clinic with daily biliary symptoms beginning 2 weeks ago. He has epigastric and right upper quadrant pain worse with meals. Pain was worse 2 weeks ago for about 2 days but is now mild. He has no fevers or chills. He has been able to tolerate plain food. He has several ultrasounds dating back to about 5 years ago showing gallstones. He has no history of abdominal surgeries. He has past medical history of diabetes.    Past Medical History:   Diagnosis Date    Anxiety     Diabetes mellitus, type 2      History reviewed. No pertinent surgical history.  Review of patient's allergies indicates:  No Known Allergies  Medication List with Changes/Refills   Current Medications    ASPIRIN (ECOTRIN) 81 MG EC TABLET    Take 1 tablet (81 mg total) by mouth Daily.    ATORVASTATIN (LIPITOR) 10 MG TABLET    TAKE 1/2 TABLET BY MOUTH THREE TIMES A WEEK AT BEDTIME.    EMPAGLIFLOZIN (JARDIANCE) 10 MG TAB    Take 10 mg by mouth every Mon, Wed, Fri. Dosage reduced to 3 times a week as per patient request because of improvement in sugars.    RAMIPRIL (ALTACE) 1.25 MG CAPSULE    On Monday, Wednesday and Friday AM    SITAGLIPTAN-METFORMIN (JANUMET XR) 100-1,000 MG TM24    Take 1 tablet by mouth Daily.     Family History   Problem Relation Age of Onset    Heart disease Mother     Diabetes Father      Social History     Socioeconomic History    Marital status:      Spouse name: Regla    Number of children: 4    Years of education: Not on file    Highest education level: Not on file   Occupational History    Occupation: Self Employed- [employer]   Social Needs    Financial resource strain: Not on file    Food insecurity:     Worry: Not on file     Inability: Not on file    Transportation needs:     Medical: Not on file     Non-medical: Not on file    Tobacco Use    Smoking status: Never Smoker    Smokeless tobacco: Former User     Types: Chew   Substance and Sexual Activity    Alcohol use: No    Drug use: No    Sexual activity: Yes     Partners: Female   Lifestyle    Physical activity:     Days per week: Not on file     Minutes per session: Not on file    Stress: Not at all   Relationships    Social connections:     Talks on phone: Not on file     Gets together: Not on file     Attends Anabaptism service: Not on file     Active member of club or organization: Not on file     Attends meetings of clubs or organizations: Not on file     Relationship status: Not on file   Other Topics Concern    Not on file   Social History Narrative    Not on file         Review of Systems   Constitutional: Negative for appetite change, chills, fever and unexpected weight change.   HENT: Negative for hearing loss, rhinorrhea, sore throat and voice change.    Eyes: Negative for photophobia and visual disturbance.   Respiratory: Negative for cough, choking and shortness of breath.    Cardiovascular: Negative for chest pain, palpitations and leg swelling.   Gastrointestinal: Positive for abdominal pain. Negative for blood in stool, constipation, diarrhea, nausea and vomiting.   Endocrine: Negative for cold intolerance, heat intolerance, polydipsia and polyuria.   Musculoskeletal: Negative for arthralgias, back pain, joint swelling and neck stiffness.   Skin: Negative for color change, pallor and rash.   Neurological: Negative for dizziness, seizures, syncope and headaches.   Hematological: Negative for adenopathy. Does not bruise/bleed easily.   Psychiatric/Behavioral: Negative for agitation, behavioral problems and confusion.       Objective:      Physical Exam   Constitutional: He appears well-developed and well-nourished.  Non-toxic appearance. No distress.   HENT:   Head: Normocephalic and atraumatic. Head is without abrasion and without laceration.   Right Ear:  External ear normal.   Left Ear: External ear normal.   Nose: Nose normal.   Mouth/Throat: Oropharynx is clear and moist.   Eyes: Pupils are equal, round, and reactive to light. EOM are normal.   Neck: Trachea normal. Neck supple. No tracheal deviation and normal range of motion present.   Cardiovascular: Normal rate and regular rhythm.   Pulmonary/Chest: Effort normal. No accessory muscle usage. No tachypnea. No respiratory distress.   Abdominal: Soft. Normal appearance and bowel sounds are normal. He exhibits no distension and no mass. There is no tenderness. There is no rigidity, no rebound, no guarding and negative Shukla's sign.   Lymphadenopathy:        Right: No inguinal adenopathy present.        Left: No inguinal adenopathy present.   Neurological: He is alert. Coordination and gait normal.   Skin: Skin is warm and intact.   Psychiatric: He has a normal mood and affect. His speech is normal and behavior is normal.       Assessment/Plan:   Pedro Luis was seen today for consult.    Diagnoses and all orders for this visit:    Chronic calculous cholecystitis  -     Ambulatory Referral to External Surgery  -     CBC auto differential; Future  -     Comprehensive metabolic panel; Future  -     X-Ray Chest PA And Lateral; Future  -     SCHEDULED EKG 12-LEAD (to Muse); Future  -     CBC auto differential  -     Comprehensive metabolic panel      Patient has daily biliary symptoms with gallstones. He will be scheduled for cholecystectomy June 26. All risk and benefits of the surgery discussed with the patient.    Planned procedure: Laparoscopic cholecystectomy    Mefoxin 2 gm IV on call to OR    NPO past midnight    Jacoby cloth scrub per protocol    SCDs Bilateral Lower Extremities    I discussed the proposed procedures the the patient including risks, benefits, indications, alternatives and special concerns.  The patient appears to understand and agrees to go ahead with surgery.  I have made no promises, warranties or  verbal agreements beyond what was discussed above.

## 2019-06-19 NOTE — LETTER
June 19, 2019      Alan Stewart MD  [address]  [address]  [place] [address]           [facility]  [address]  [place] [address]  Phone: [phone]  Fax: [phone]          Patient: Pedro Luis Pérez   MR Number: [id number]   YOB: 1965   Date of Visit: 6/19/2019       Dear Dr. Alan Stewart:    Thank you for referring Pedro Luis Pérez to me for evaluation. Attached you will find relevant portions of my assessment and plan of care.    If you have questions, please do not hesitate to call me. I look forward to following Pedro Luis Pérez along with you.    Sincerely,    Gibran Lowe III, MD    Enclosure  CC:  No Recipients    If you would like to receive this communication electronically, please contact [email or web address] or [phone] to request more information on [employer] Link access.    For providers and/or their staff who would like to refer a patient to [facility], please contact us through our one-stop-shop provider referral line, [facility] , at [phone].    If you feel you have received this communication in error or would no longer like to receive these types of communications, please e-mail [email or web address]

## 2019-06-20 LAB
ALBUMIN SERPL-MCNC: 4.1 G/DL (ref 3.1–4.7)
ALP SERPL-CCNC: 57 IU/L (ref 40–104)
ALT (SGPT): 28 IU/L (ref 3–33)
AST SERPL-CCNC: 19 IU/L (ref 10–40)
BASOPHILS NFR BLD: 0 K/UL (ref 0–0.2)
BASOPHILS NFR BLD: 0.5 %
BILIRUB SERPL-MCNC: 1 MG/DL (ref 0.3–1)
BUN SERPL-MCNC: 12 MG/DL (ref 8–20)
CALCIUM SERPL-MCNC: 8.6 MG/DL (ref 7.7–10.4)
CHLORIDE: 107 MMOL/L (ref 98–110)
CO2 SERPL-SCNC: 27.5 MMOL/L (ref 22.8–31.6)
CREATININE: 0.85 MG/DL (ref 0.6–1.4)
EOSINOPHIL NFR BLD: 0.1 K/UL (ref 0–0.7)
EOSINOPHIL NFR BLD: 1.6 %
ERYTHROCYTE [DISTWIDTH] IN BLOOD BY AUTOMATED COUNT: 12.9 % (ref 11.7–14.9)
GLUCOSE: 234 MG/DL (ref 70–99)
GRAN #: 3.3 K/UL (ref 1.4–6.5)
GRAN%: 60.2 %
HCT VFR BLD AUTO: 45.3 % (ref 39–55)
HGB BLD-MCNC: 15.2 G/DL (ref 14–16)
IMMATURE GRANS (ABS): 0.1 K/UL (ref 0–1)
IMMATURE GRANULOCYTES: 1.5 %
LYMPH #: 1.7 K/UL (ref 1.2–3.4)
LYMPH%: 30.6 %
MCH RBC QN AUTO: 30.3 PG (ref 25–35)
MCHC RBC AUTO-ENTMCNC: 33.6 G/DL (ref 31–36)
MCV RBC AUTO: 90.2 FL (ref 80–100)
MONO #: 0.3 K/UL (ref 0.1–0.6)
MONO%: 5.6 %
NUCLEATED RBCS: 0 %
PLATELET # BLD AUTO: 313 K/UL (ref 140–440)
PMV BLD AUTO: 9.2 FL (ref 8.8–12.7)
POTASSIUM SERPL-SCNC: 4.1 MMOL/L (ref 3.5–5)
PROT SERPL-MCNC: 6.5 G/DL (ref 6–8.2)
RBC # BLD AUTO: 5.02 M/UL (ref 4.3–5.9)
SODIUM: 140 MMOL/L (ref 134–144)
WBC # BLD AUTO: 5.5 K/UL (ref 5–10)

## 2019-06-25 ENCOUNTER — TELEPHONE (OUTPATIENT)
Dept: SURGERY | Facility: CLINIC | Age: 54
End: 2019-06-25

## 2019-06-25 ENCOUNTER — TELEPHONE (OUTPATIENT)
Dept: FAMILY MEDICINE | Facility: CLINIC | Age: 54
End: 2019-06-25

## 2019-06-25 DIAGNOSIS — R94.31 ABNORMAL EKG: Primary | ICD-10-CM

## 2019-06-25 NOTE — TELEPHONE ENCOUNTER
Per Swathi, pts preop ekg is abn...pt needs cardiac clearance.. Pt does not have a cardiologist.. swathi will notify pt..

## 2019-06-25 NOTE — TELEPHONE ENCOUNTER
Surg Cx'ld.. Once pt est w/ cardiologist he is to call our office so we can fax a clearance request..     Pt verbalized understanding

## 2019-06-25 NOTE — TELEPHONE ENCOUNTER
Zan will not do gallbladder surg until he is checked out by heart dr    EKG abnormal  Needs ref  Pended please sign     Written ref letter

## 2019-08-06 DIAGNOSIS — E11.9 TYPE 2 DIABETES MELLITUS WITHOUT COMPLICATION, WITHOUT LONG-TERM CURRENT USE OF INSULIN: ICD-10-CM

## 2019-08-06 RX ORDER — SITAGLIPTIN AND METFORMIN HYDROCHLORIDE 1000; 100 MG/1; MG/1
TABLET, FILM COATED, EXTENDED RELEASE ORAL
Qty: 90 TABLET | Refills: 1 | Status: SHIPPED | OUTPATIENT
Start: 2019-08-06 | End: 2020-02-05

## 2019-08-06 RX ORDER — RAMIPRIL 1.25 MG/1
CAPSULE ORAL
Qty: 40 CAPSULE | Refills: 1 | Status: SHIPPED | OUTPATIENT
Start: 2019-08-06 | End: 2020-02-26

## 2019-08-21 ENCOUNTER — OFFICE VISIT (OUTPATIENT)
Dept: SURGERY | Facility: CLINIC | Age: 54
End: 2019-08-21
Payer: COMMERCIAL

## 2019-08-21 VITALS
DIASTOLIC BLOOD PRESSURE: 80 MMHG | BODY MASS INDEX: 24.4 KG/M2 | SYSTOLIC BLOOD PRESSURE: 116 MMHG | WEIGHT: 161 LBS | HEIGHT: 68 IN

## 2019-08-21 DIAGNOSIS — K80.10 CHRONIC CALCULOUS CHOLECYSTITIS: Primary | ICD-10-CM

## 2019-08-21 PROCEDURE — 99999 PR PBB SHADOW E&M-EST. PATIENT-LVL IV: CPT | Mod: PBBFAC,,, | Performed by: SURGERY

## 2019-08-21 PROCEDURE — 3008F PR BODY MASS INDEX (BMI) DOCUMENTED: ICD-10-PCS | Mod: S$GLB,,, | Performed by: SURGERY

## 2019-08-21 PROCEDURE — 3008F BODY MASS INDEX DOCD: CPT | Mod: S$GLB,,, | Performed by: SURGERY

## 2019-08-21 PROCEDURE — 99213 OFFICE O/P EST LOW 20 MIN: CPT | Mod: S$GLB,,, | Performed by: SURGERY

## 2019-08-21 PROCEDURE — 99213 PR OFFICE/OUTPT VISIT, EST, LEVL III, 20-29 MIN: ICD-10-PCS | Mod: S$GLB,,, | Performed by: SURGERY

## 2019-08-21 PROCEDURE — 99999 PR PBB SHADOW E&M-EST. PATIENT-LVL IV: ICD-10-PCS | Mod: PBBFAC,,, | Performed by: SURGERY

## 2019-08-21 RX ORDER — PANTOPRAZOLE SODIUM 40 MG/1
TABLET, DELAYED RELEASE ORAL
Refills: 3 | COMMUNITY
Start: 2019-07-09 | End: 2019-08-21

## 2019-08-21 RX ORDER — LIDOCAINE HYDROCHLORIDE 10 MG/ML
1 INJECTION, SOLUTION EPIDURAL; INFILTRATION; INTRACAUDAL; PERINEURAL ONCE
Status: DISCONTINUED | OUTPATIENT
Start: 2019-08-21 | End: 2021-05-06

## 2019-08-21 RX ORDER — ONDANSETRON 4 MG/1
TABLET, ORALLY DISINTEGRATING ORAL
Refills: 0 | COMMUNITY
Start: 2019-07-06 | End: 2019-08-21

## 2019-08-21 RX ORDER — OXYCODONE AND ACETAMINOPHEN 5; 325 MG/1; MG/1
TABLET ORAL
Refills: 0 | COMMUNITY
Start: 2019-07-06 | End: 2019-08-21

## 2019-08-21 RX ORDER — SODIUM CHLORIDE 9 MG/ML
INJECTION, SOLUTION INTRAVENOUS CONTINUOUS
Status: CANCELLED | OUTPATIENT
Start: 2019-08-21

## 2019-08-21 NOTE — PROGRESS NOTES
Subjective:       Patient ID: Pedro Luis Pérez is a 53 y.o. male.    Chief Complaint: Other (ReEval Gallbladder)      HPI:  Patient is 53-year-old male who returns to the clinic to discuss cholecystectomy.  He was seen two months ago with daily biliary symptoms beginning in May. He has epigastric and right upper quadrant pain worse with meals. He had a bad episode in early June that lasted for about 2 days but is now mild. He has no fevers or chills. He has been able to tolerate plain food. He has several ultrasounds dating back to about 5 years ago showing gallstones. He has no history of abdominal surgeries. He has past medical history of diabetes. He was scheduled for cholecystectomy but was canceled for cardiac clearance.  He now has cardiac clearance for surgery.    Past Medical History:   Diagnosis Date    Anxiety     Diabetes mellitus, type 2      History reviewed. No pertinent surgical history.  Review of patient's allergies indicates:  No Known Allergies  Medication List with Changes/Refills   Current Medications    ASPIRIN (ECOTRIN) 81 MG EC TABLET    Take 1 tablet (81 mg total) by mouth Daily.    ATORVASTATIN (LIPITOR) 10 MG TABLET    TAKE 1/2 TABLET BY MOUTH THREE TIMES A WEEK AT BEDTIME.    BLOOD SUGAR DIAGNOSTIC (BLOOD GLUCOSE TEST) STRP    1 each by Other route.    EMPAGLIFLOZIN (JARDIANCE) 10 MG TAB    Take 10 mg by mouth every Mon, Wed, Fri. Dosage reduced to 3 times a week as per patient request because of improvement in sugars.    JANUMET -1,000 MG TM24    TAKE 1 TABLET BY MOUTH DAILY.    RAMIPRIL (ALTACE) 1.25 MG CAPSULE    TAKE ONE CAPSULE BY MOUTH ON MONDAYS, WEDNESDAYS, AND FRIDAY MORNING   Discontinued Medications    ONDANSETRON (ZOFRAN-ODT) 4 MG TBDL    DIS ONE T PO  Q 8 H PRN N    OXYCODONE-ACETAMINOPHEN (PERCOCET) 5-325 MG PER TABLET    TK 1 T PO  Q 6 H PRN P CONTROL    PANTOPRAZOLE (PROTONIX) 40 MG TABLET    TK 1 T PO QD     Family History   Problem Relation Age of Onset    Heart  disease Mother     Diabetes Father      Social History     Socioeconomic History    Marital status:      Spouse name: Regla    Number of children: 4    Years of education: Not on file    Highest education level: Not on file   Occupational History    Occupation: Self Employed- [employer]   Social Needs    Financial resource strain: Not on file    Food insecurity:     Worry: Not on file     Inability: Not on file    Transportation needs:     Medical: Not on file     Non-medical: Not on file   Tobacco Use    Smoking status: Never Smoker    Smokeless tobacco: Former User     Types: Chew   Substance and Sexual Activity    Alcohol use: No    Drug use: No    Sexual activity: Yes     Partners: Female   Lifestyle    Physical activity:     Days per week: Not on file     Minutes per session: Not on file    Stress: Not at all   Relationships    Social connections:     Talks on phone: Not on file     Gets together: Not on file     Attends Yarsanism service: Not on file     Active member of club or organization: Not on file     Attends meetings of clubs or organizations: Not on file     Relationship status: Not on file   Other Topics Concern    Not on file   Social History Narrative    Not on file         Review of Systems   Constitutional: Negative for appetite change, chills, fever and unexpected weight change.   HENT: Negative for hearing loss, rhinorrhea, sore throat and voice change.    Eyes: Negative for photophobia and visual disturbance.   Respiratory: Negative for cough, choking and shortness of breath.    Cardiovascular: Negative for chest pain, palpitations and leg swelling.   Gastrointestinal: Positive for abdominal pain. Negative for blood in stool, constipation, diarrhea, nausea and vomiting.   Endocrine: Negative for cold intolerance, heat intolerance, polydipsia and polyuria.   Musculoskeletal: Negative for arthralgias, back pain, joint swelling and neck stiffness.   Skin: Negative for  color change, pallor and rash.   Neurological: Negative for dizziness, seizures, syncope and headaches.   Hematological: Negative for adenopathy. Does not bruise/bleed easily.   Psychiatric/Behavioral: Negative for agitation, behavioral problems and confusion.       Objective:      Physical Exam   Constitutional: He appears well-developed and well-nourished.  Non-toxic appearance. No distress.   HENT:   Head: Normocephalic and atraumatic. Head is without abrasion and without laceration.   Right Ear: External ear normal.   Left Ear: External ear normal.   Nose: Nose normal.   Mouth/Throat: Oropharynx is clear and moist.   Eyes: Pupils are equal, round, and reactive to light. EOM are normal.   Neck: Trachea normal. Neck supple. No tracheal deviation and normal range of motion present.   Cardiovascular: Normal rate and regular rhythm.   Pulmonary/Chest: Effort normal. No accessory muscle usage. No tachypnea. No respiratory distress.   Abdominal: Soft. Normal appearance and bowel sounds are normal. He exhibits no distension and no mass. There is no tenderness. There is no rigidity, no rebound, no guarding and negative Shukla's sign.   Lymphadenopathy: No inguinal adenopathy noted on the right or left side.   Neurological: He is alert. Coordination and gait normal.   Skin: Skin is warm and intact.   Psychiatric: He has a normal mood and affect. His speech is normal and behavior is normal.       Assessment/Plan:   Chronic calculous cholecystitis  -     Insert peripheral IV; Standing  -     Case Request Operating Room: CHOLECYSTECTOMY, LAPAROSCOPIC  -     Full code; Standing  -     Comprehensive metabolic panel; Future; Expected date: 08/21/2019  -     CBC auto differential; Future; Expected date: 08/21/2019  -     EKG 12-lead; Future  -     X-Ray Chest 1 View; Future; Expected date: 08/21/2019    Other orders  -     lidocaine (PF) 10 mg/ml (1%) injection 10 mg  -     ceFOXItin (MEFOXIN) 2 g in dextrose 5 % 100 mL IVPB      He will be scheduled for cholecystectomy September 24th.    Planned procedure:  Laparoscopic cholecystectomy    Mefoxin 2 gm IV on call to OR    NPO past midnight    Jacoby cloth scrub per protocol    SCD's Bilateral Lower Extremities    I discussed the proposed procedures the patient including risks, benefits, indications, alternatives and special concerns.  The patient appears to understand and agrees to go ahead with surgery.  I have made no promises, warranties or verbal agreements beyond what was discussed above.    No follow-ups on file.

## 2019-09-04 DIAGNOSIS — E11.9 TYPE 2 DIABETES MELLITUS WITHOUT COMPLICATION, WITHOUT LONG-TERM CURRENT USE OF INSULIN: ICD-10-CM

## 2019-09-04 RX ORDER — EMPAGLIFLOZIN 10 MG/1
TABLET, FILM COATED ORAL
Qty: 90 TABLET | Refills: 1 | Status: SHIPPED | OUTPATIENT
Start: 2019-09-04 | End: 2020-01-13

## 2019-09-07 ENCOUNTER — TELEPHONE (OUTPATIENT)
Dept: FAMILY MEDICINE | Facility: CLINIC | Age: 54
End: 2019-09-07

## 2019-09-07 LAB
ALBUMIN SERPL-MCNC: 4.7 G/DL (ref 3.5–5.5)
ALBUMIN/CREAT UR: 13.7 MG/G CREAT (ref 0–30)
ALBUMIN/GLOB SERPL: 2.4 {RATIO} (ref 1.2–2.2)
ALP SERPL-CCNC: 59 IU/L (ref 39–117)
ALT SERPL-CCNC: 19 IU/L (ref 0–44)
APPEARANCE UR: CLEAR
AST SERPL-CCNC: 17 IU/L (ref 0–40)
BILIRUB SERPL-MCNC: 0.9 MG/DL (ref 0–1.2)
BILIRUB UR QL STRIP: NEGATIVE
BUN SERPL-MCNC: 20 MG/DL (ref 6–24)
BUN/CREAT SERPL: 25 (ref 9–20)
CALCIUM SERPL-MCNC: 9.1 MG/DL (ref 8.7–10.2)
CHLORIDE SERPL-SCNC: 103 MMOL/L (ref 96–106)
CHOLEST SERPL-MCNC: 144 MG/DL (ref 100–199)
CO2 SERPL-SCNC: 20 MMOL/L (ref 20–29)
COLOR UR: YELLOW
CREAT SERPL-MCNC: 0.8 MG/DL (ref 0.76–1.27)
CREAT UR-MCNC: 97.2 MG/DL
GLOBULIN SER CALC-MCNC: 2 G/DL (ref 1.5–4.5)
GLUCOSE SERPL-MCNC: 179 MG/DL (ref 65–99)
GLUCOSE UR QL: ABNORMAL
HBA1C MFR BLD: 7.8 % (ref 4.8–5.6)
HDLC SERPL-MCNC: 42 MG/DL
HGB UR QL STRIP: NEGATIVE
KETONES UR QL STRIP: ABNORMAL
LDLC SERPL CALC-MCNC: 90 MG/DL (ref 0–99)
LEUKOCYTE ESTERASE UR QL STRIP: NEGATIVE
MICRO URNS: ABNORMAL
MICROALBUMIN UR-MCNC: 13.3 UG/ML
NITRITE UR QL STRIP: NEGATIVE
PH UR STRIP: 5 [PH] (ref 5–7.5)
POTASSIUM SERPL-SCNC: 4.4 MMOL/L (ref 3.5–5.2)
PROT SERPL-MCNC: 6.7 G/DL (ref 6–8.5)
PROT UR QL STRIP: NEGATIVE
SODIUM SERPL-SCNC: 140 MMOL/L (ref 134–144)
SP GR UR: >=1.03 (ref 1–1.03)
TRIGL SERPL-MCNC: 58 MG/DL (ref 0–149)
UROBILINOGEN UR STRIP-MCNC: 0.2 MG/DL (ref 0.2–1)
VLDLC SERPL CALC-MCNC: 12 MG/DL (ref 5–40)

## 2019-09-07 NOTE — TELEPHONE ENCOUNTER
Patient's hemoglobin A1c has crept up somewhat.  Will discuss this at his follow-up on 07/10/2019.

## 2019-09-10 ENCOUNTER — OFFICE VISIT (OUTPATIENT)
Dept: FAMILY MEDICINE | Facility: CLINIC | Age: 54
End: 2019-09-10
Payer: COMMERCIAL

## 2019-09-10 VITALS
SYSTOLIC BLOOD PRESSURE: 123 MMHG | HEIGHT: 68 IN | BODY MASS INDEX: 23.64 KG/M2 | HEART RATE: 78 BPM | WEIGHT: 156 LBS | DIASTOLIC BLOOD PRESSURE: 81 MMHG

## 2019-09-10 DIAGNOSIS — K80.50 CALCULUS OF BILE DUCT WITHOUT CHOLECYSTITIS AND WITHOUT OBSTRUCTION: ICD-10-CM

## 2019-09-10 DIAGNOSIS — E78.5 DYSLIPIDEMIA: Chronic | ICD-10-CM

## 2019-09-10 DIAGNOSIS — E11.9 TYPE 2 DIABETES MELLITUS WITHOUT COMPLICATION, WITHOUT LONG-TERM CURRENT USE OF INSULIN: Primary | Chronic | ICD-10-CM

## 2019-09-10 PROCEDURE — 99214 OFFICE O/P EST MOD 30 MIN: CPT | Mod: S$GLB,,, | Performed by: INTERNAL MEDICINE

## 2019-09-10 PROCEDURE — 99214 PR OFFICE/OUTPT VISIT, EST, LEVL IV, 30-39 MIN: ICD-10-PCS | Mod: S$GLB,,, | Performed by: INTERNAL MEDICINE

## 2019-09-10 PROCEDURE — 99999 PR PBB SHADOW E&M-EST. PATIENT-LVL III: CPT | Mod: PBBFAC,,, | Performed by: INTERNAL MEDICINE

## 2019-09-10 PROCEDURE — 99999 PR PBB SHADOW E&M-EST. PATIENT-LVL III: ICD-10-PCS | Mod: PBBFAC,,, | Performed by: INTERNAL MEDICINE

## 2019-09-10 RX ORDER — ATORVASTATIN CALCIUM 10 MG/1
TABLET, FILM COATED ORAL
Qty: 18 TABLET | Refills: 2 | Status: SHIPPED | OUTPATIENT
Start: 2019-09-10 | End: 2021-04-20

## 2019-09-10 NOTE — PATIENT INSTRUCTIONS
Cholecystectomy     Clips close off the duct connecting the gallbladder to the bile duct. The gallbladder is then removed.     Youve had painful attacks caused by gallstones. To treat the problem, your healthcare provider wants to remove your gallbladder. This surgery is called cholecystectomy. Removing the gallbladder can relieve pain. It will also prevent future attacks. You can live a healthy life without your gallbladder. You may also be able to go back to eating foods you enjoyed before your gallbladder problems started.  Before your surgery  Be prepared:  · Tell your provider what medicines you take. Include those bought over the counter. Also include herbs or supplements. Be sure to mention if you take prescription blood thinners. This includes warfarin, clopidogrel, and aspirin.  · Have any tests your provider asks for, such as blood tests.  · Dont eat or drink after midnight, the night before your surgery. This includes water, coffee, and mints. However, you may need to take some medicine with sips of water--talk with your healthcare provider.  The day of surgery  When you arrive, you will prepare for surgery:  · An IV line will be put into a vein in your arm or hand. This gives you fluids and medicine.  · An anesthesiologist will talk with you about anesthesia. This is medicine used to prevent pain. You will receive general anesthesia. This puts you into a state like deep sleep through the procedure.  During surgery  There are 2 methods for removing the gallbladder. Your healthcare provider will choose which method is best for you:  · Laparoscopic cholecystectomy. This is most common. During surgery, 2 to 4 small incisions are made. A thin tube with a camera is used. This is called a laparoscope. The scope is put through one of the incisions. It sends images to a video screen. Surgical tools are put through other incisions. The gallbladder is removed using the scope and these tools.  · Open  cholecystectomy. One larger incision is made. The surgeon sees and works through this incision. Open surgery is most often used when scarring or other factors make it a better choice for you.  In some cases, safety requires a change from laparoscopic to open surgery during the procedure.  After surgery  You will be sent to a room to wake up from the anesthesia. You will likely go home the same day. In some cases, an overnight stay is needed. If you had open cholecystectomy, you may need to stay in the hospital for a few days. When you are released to go home, have a family member or friend ready to drive you.  Risks and possible complications of gallbladder surgery  All surgeries have risks. The risks of gallbladder surgery include:  · Bleeding  · Infection  · Injury to the common bile duct or nearby organs  · Blood clots in the legs  · Bile leaks  · Hernia at incision site  · Pnemonia   Date Last Reviewed: 7/1/2016  © 3246-7860 [employer]. [address] [place] [address], [place] [address]. All rights reserved. This information is not intended as a substitute for professional medical care. Always follow your healthcare professional's instructions.

## 2019-09-10 NOTE — PROGRESS NOTES
Subjective:       Patient ID: Pedro Luis Pérez is a 53 y.o. male.    Chief Complaint: Diabetes (lab review ) and Hyperlipidemia    Mr. Pérez is a 53-year-old gentleman who comes for follow-up.  Recently he was diagnosed to have gallbladder stones with abdominal discomfort and pain. He had a preoperative evaluation with a slightly abnormal EKG showing some inferior wall changes.  He had a Myoview stress test done by Dr. Kellogg which had shown inferior wall artifact but otherwise echocardiogram had showed a good ejection fraction.  He will be scheduled for cholecystectomy by Dr. Khan on September 24th.    He is taking his medication for diabetes and aspirin.  For some reason he has not taken atorvastatin which had seemed to cause some problems in the beginning but later on he was able to tolerated. He is taking ramipril also for kidney protection.    Diabetes   He presents for his follow-up diabetic visit. He has type 2 diabetes mellitus. His disease course has been stable. Pertinent negatives for hypoglycemia include no dizziness, nervousness/anxiousness, pallor or seizures. Pertinent negatives for diabetes include no blurred vision, no chest pain, no fatigue, no foot ulcerations, no polydipsia, no polyphagia, no polyuria and no visual change. There are no hypoglycemic complications. Risk factors for coronary artery disease include male sex, sedentary lifestyle, dyslipidemia and diabetes mellitus. Current diabetic treatment includes oral agent (triple therapy). Meal planning includes avoidance of concentrated sweets. He rarely participates in exercise.   Hyperlipidemia   This is a chronic problem. The current episode started more than 1 year ago. The problem is controlled. He has no history of obesity. Pertinent negatives include no chest pain or myalgias. Current antihyperlipidemic treatment includes statins. The current treatment provides moderate improvement of lipids. Compliance problems include medication side  effects (? muscle aches).  Risk factors for coronary artery disease include male sex, dyslipidemia and diabetes mellitus.       Past Medical History:   Diagnosis Date    Anxiety     Diabetes mellitus, type 2      Social History     Socioeconomic History    Marital status:      Spouse name: Regla    Number of children: 4    Years of education: Not on file    Highest education level: Not on file   Occupational History    Occupation: Self Employed- [employer]   Social Needs    Financial resource strain: Not on file    Food insecurity:     Worry: Not on file     Inability: Not on file    Transportation needs:     Medical: Not on file     Non-medical: Not on file   Tobacco Use    Smoking status: Never Smoker    Smokeless tobacco: Former User     Types: Chew   Substance and Sexual Activity    Alcohol use: No    Drug use: No    Sexual activity: Yes     Partners: Female   Lifestyle    Physical activity:     Days per week: Not on file     Minutes per session: Not on file    Stress: Not at all   Relationships    Social connections:     Talks on phone: Not on file     Gets together: Not on file     Attends Denominational service: Not on file     Active member of club or organization: Not on file     Attends meetings of clubs or organizations: Not on file     Relationship status: Not on file   Other Topics Concern    Not on file   Social History Narrative    Not on file     History reviewed. No pertinent surgical history.  Family History   Problem Relation Age of Onset    Heart disease Mother     Diabetes Father        Review of Systems   Constitutional: Negative for activity change, appetite change, fatigue, fever and unexpected weight change.   HENT: Negative for congestion, sneezing and trouble swallowing.    Eyes: Negative for blurred vision, pain and visual disturbance.   Respiratory: Negative for cough and chest tightness.    Cardiovascular: Negative for chest pain and leg swelling.         "Patient is on ACE inhibitor to protect his kidneys. He has normal blood pressure otherwise.   Gastrointestinal: Negative for abdominal distention, abdominal pain, blood in stool, constipation and diarrhea.   Endocrine: Negative for cold intolerance, heat intolerance, polydipsia, polyphagia and polyuria.        Patient has mild dyslipidemia. Patient also has diabetes mellitus type 2.   Genitourinary: Negative for dysuria, frequency, hematuria and scrotal swelling.   Musculoskeletal: Negative for arthralgias, back pain, gait problem and myalgias.   Skin: Negative for pallor, rash and wound.   Allergic/Immunologic: Negative for environmental allergies, food allergies and immunocompromised state.   Neurological: Negative for dizziness, seizures, light-headedness and numbness.   Hematological: Negative for adenopathy. Does not bruise/bleed easily.   Psychiatric/Behavioral: The patient is not nervous/anxious.          Objective:      Blood pressure 123/81, pulse 78, height 5' 8" (1.727 m), weight 70.8 kg (156 lb). Body mass index is 23.72 kg/m².  Physical Exam   Constitutional: He is oriented to person, place, and time. He appears well-developed and well-nourished. No distress.   HENT:   Head: Normocephalic and atraumatic.   Nose: Nose normal.   Mouth/Throat: No oropharyngeal exudate.   Eyes: Conjunctivae and EOM are normal.   Neck: Normal range of motion. Neck supple. No JVD present. No tracheal deviation present. No thyromegaly present.   Cardiovascular: Normal rate, regular rhythm and normal heart sounds. Exam reveals no gallop and no friction rub.   No murmur heard.  Pulses:       Dorsalis pedis pulses are 1+ on the right side, and 1+ on the left side.        Posterior tibial pulses are 1+ on the right side, and 1+ on the left side.   Pulmonary/Chest: Effort normal and breath sounds normal. No respiratory distress. He has no wheezes. He has no rales.   Abdominal: Soft. Bowel sounds are normal. He exhibits no " distension. There is no tenderness.   Musculoskeletal: He exhibits no edema, tenderness or deformity.        Right foot: There is normal range of motion and no deformity.        Left foot: There is normal range of motion and no deformity.   Feet:   Right Foot:   Protective Sensation: 4 sites tested. 4 sites sensed.   Skin Integrity: Negative for ulcer or blister.   Left Foot:   Protective Sensation: 4 sites tested. 4 sites sensed.   Skin Integrity: Negative for ulcer or blister.   Neurological: He is alert and oriented to person, place, and time. He has normal reflexes.   Skin: Skin is warm and dry.   Psychiatric: He has a normal mood and affect.   Nursing note and vitals reviewed.        Assessment:       1. Type 2 diabetes mellitus without complication, without long-term current use of insulin    2. Dyslipidemia    3. Calculus of bile duct without cholecystitis and without obstruction           Orders Only on 09/06/2019   Component Date Value Ref Range Status    Glucose 09/06/2019 179* 65 - 99 mg/dL Final    BUN, Bld 09/06/2019 20  6 - 24 mg/dL Final    Creatinine 09/06/2019 0.80  0.76 - 1.27 mg/dL Final    eGFR if non African American 09/06/2019 101  >59 mL/min/1.73 Final    eGFR if  09/06/2019 116  >59 mL/min/1.73 Final    BUN/Creatinine Ratio 09/06/2019 25* 9 - 20 Final    Sodium 09/06/2019 140  134 - 144 mmol/L Final    Potassium 09/06/2019 4.4  3.5 - 5.2 mmol/L Final    Chloride 09/06/2019 103  96 - 106 mmol/L Final    CO2 09/06/2019 20  20 - 29 mmol/L Final    Calcium 09/06/2019 9.1  8.7 - 10.2 mg/dL Final    Total Protein 09/06/2019 6.7  6.0 - 8.5 g/dL Final    Albumin 09/06/2019 4.7  3.5 - 5.5 g/dL Final    Globulin, Total 09/06/2019 2.0  1.5 - 4.5 g/dL Final    Albumin/Globulin Ratio 09/06/2019 2.4* 1.2 - 2.2 Final    Total Bilirubin 09/06/2019 0.9  0.0 - 1.2 mg/dL Final    Alkaline Phosphatase 09/06/2019 59  39 - 117 IU/L Final    AST 09/06/2019 17  0 - 40 IU/L Final     ALT 09/06/2019 19  0 - 44 IU/L Final    Specific Gravity, UA 09/06/2019 >=1.030* 1.005 - 1.030 Final    pH, UA 09/06/2019 5.0  5.0 - 7.5 Final    Color, UA 09/06/2019 Yellow  Yellow Final    Clarity, UA 09/06/2019 Clear  Clear Final    Leukocytes, UA 09/06/2019 Negative  Negative Final    Protein, UA 09/06/2019 Negative  Negative/Trace Final    Glucose, UA 09/06/2019 3+* Negative Final    Ketones, UA 09/06/2019 Trace* Negative Final    Occult Blood UA 09/06/2019 Negative  Negative Final    Bilirubin, UA 09/06/2019 Negative  Negative Final    Urobilinogen, UA 09/06/2019 0.2  0.2 - 1.0 mg/dL Final    Nitrite, UA 09/06/2019 Negative  Negative Final    Microscopic Examination 09/06/2019 Comment   Final    Cholesterol 09/06/2019 144  100 - 199 mg/dL Final    Triglycerides 09/06/2019 58  0 - 149 mg/dL Final    HDL 09/06/2019 42  >39 mg/dL Final    VLDL Cholesterol Blaze 09/06/2019 12  5 - 40 mg/dL Final    LDL Calculated 09/06/2019 90  0 - 99 mg/dL Final    Creatinine, Random Ur 09/06/2019 97.2  Not Estab. mg/dL Final    Microalb, Ur 09/06/2019 13.3  Not Estab. ug/mL Final    Microalb/Crt. Ratio 09/06/2019 13.7  0.0 - 30.0 mg/g creat Final    Hemoglobin A1C 09/06/2019 7.8* 4.8 - 5.6 % Final   Office Visit on 06/19/2019   Component Date Value Ref Range Status    WBC 06/20/2019 5.5  5.0 - 10.0 K/uL Final    RBC 06/20/2019 5.02  4.30 - 5.90 M/uL Final    Hemoglobin 06/20/2019 15.2  14.0 - 16.0 g/dL Final    Hematocrit 06/20/2019 45.3  39.0 - 55.0 % Final    Mean Corpuscular Volume 06/20/2019 90.2  80.0 - 100.0 fL Final    Mean Corpuscular Hemoglobin 06/20/2019 30.3  25.0 - 35.0 pg Final    Mean Corpuscular Hemoglobin Conc 06/20/2019 33.6  31.0 - 36.0 g/dL Final    RDW 06/20/2019 12.9  11.7 - 14.9 % Final    Platelets 06/20/2019 313  140 - 440 K/uL Final    MPV 06/20/2019 9.2  8.8 - 12.7 fL Final    Gran% 06/20/2019 60.2  % Final    Lymph% 06/20/2019 30.6  % Final    Mono% 06/20/2019 5.6  %  Final    Eosinophil% 06/20/2019 1.6  % Final    Basophil% 06/20/2019 0.5  % Final    Gran # (ANC) 06/20/2019 3.3  1.4 - 6.5 K/uL Final    Lymph # 06/20/2019 1.7  1.2 - 3.4 K/uL Final    Mono # 06/20/2019 0.3  0.1 - 0.6 K/uL Final    Eos # 06/20/2019 0.1  0.0 - 0.7 K/uL Final    Baso # 06/20/2019 0.0  0.0 - 0.2 K/uL Final    Immature Grans (Abs) 06/20/2019 0.1  0.0 - 1.0 K/uL Final    Immature Granulocytes 06/20/2019 1.5  % Final    nRBC% 06/20/2019 0  % Final    Glucose 06/20/2019 234* 70 - 99 mg/dL Final    BUN, Bld 06/20/2019 12  8 - 20 mg/dL Final    Creatinine 06/20/2019 0.85  0.60 - 1.40 mg/dL Final    Calcium 06/20/2019 8.6  7.7 - 10.4 mg/dL Final    Sodium 06/20/2019 140  134 - 144 mmol/L Final    Potassium 06/20/2019 4.1  3.5 - 5.0 mmol/L Final    Chloride 06/20/2019 107  98 - 110 mmol/L Final    CO2 06/20/2019 27.5  22.8 - 31.6 mmol/L Final    Albumin 06/20/2019 4.1  3.1 - 4.7 g/dL Final    Total Bilirubin 06/20/2019 1.0  0.3 - 1.0 mg/dL Final    Alkaline Phosphatase 06/20/2019 57  40 - 104 IU/L Final    Total Protein 06/20/2019 6.5  6.0 - 8.2 g/dL Final    ALT (SGPT) 06/20/2019 28  3 - 33 IU/L Final    AST 06/20/2019 19  10 - 40 IU/L Final         Plan:           Type 2 diabetes mellitus without complication, without long-term current use of insulin  Comments:  Patient's recent hemoglobin A1c 7.8.  He will be scheduled for gallbladder surgery.  I have advised him to take Jardiance on a daily basis at least till the jessica  Orders:  -     Ambulatory Referral to Ophthalmology  -     Hemoglobin A1c; Future; Expected date: 12/23/2019    Dyslipidemia  Comments:  Patient is taking atorvastatin 3 times a week.  There is a history of intolerance at higher dose.  His lipid panel is excellent.  Orders:  -     atorvastatin (LIPITOR) 10 MG tablet; TAKE 1/2 TABLET BY MOUTH THREE TIMES A WEEK AT BEDTIME.  Dispense: 18 tablet; Refill: 2    Calculus of bile duct without cholecystitis and without  obstruction  Comments:  Patient will undergo cholecystectomy on September 24th by Dr. Khan.  He had a cardiac clearance from Dr. Kellogg.      Patient's hemoglobin A1c somewhat elevated.  Issue surgery will be scheduled on 24th of September.    I have reviewed his outside reports of electrocardiogram as well as stress Myoview testing.    I have advised patient to check his blood sugars and give me an update in the next few days so that I can make adjustments accordingly.    Patient actually has not been taking the Jardiance on a daily basis at this point because of urinary symptoms.  I have advised him to take it on a daily basis at top it off with the extra glass of water.  His urine does show glycosuria which is expected on this medication.    On the day of surgery patient will not take the diabetic medication.  I have advised him to give me an update by next Monday as to how his blood sugars are running.  I have encouraged him to join the patient portal also for communication.    Otherwise, if all goes well I will see him in 4 months time and repeat a hemoglobin A1c at that time.    Flu season has started head is welcome to get the flu shot sometimes in the month of October when we have the supply.  Or he can get it from his pharmacy also.    Initially I had entered flu vaccination as an allergy but patient actually never had a flu vaccination.  He has just heard it from other people that they had reaction and he is worried that he might have a reaction.  I have encouraged him to at least give it a try sometimes later on during this year and see how he does.  Most likely if he takes a Tylenol or perhaps some Motrin when he takes a flu shot he will do fine.      Current Outpatient Medications:     aspirin (ECOTRIN) 81 MG EC tablet, Take 1 tablet (81 mg total) by mouth Daily., Disp: 100 tablet, Rfl: 1    atorvastatin (LIPITOR) 10 MG tablet, TAKE 1/2 TABLET BY MOUTH THREE TIMES A WEEK AT BEDTIME., Disp: 18  tablet, Rfl: 2    blood sugar diagnostic (BLOOD GLUCOSE TEST) Strp, 1 each by Other route., Disp: , Rfl:     JANUMET -1,000 mg TM24, TAKE 1 TABLET BY MOUTH DAILY., Disp: 90 tablet, Rfl: 1    JARDIANCE 10 mg Tab, TAKE 1 TABLET BY MOUTH ONCE DAILY, Disp: 90 tablet, Rfl: 1    ramipril (ALTACE) 1.25 MG capsule, TAKE ONE CAPSULE BY MOUTH ON MONDAYS, WEDNESDAYS, AND FRIDAY MORNING, Disp: 40 capsule, Rfl: 1    Current Facility-Administered Medications:     ceFOXItin (MEFOXIN) 2 g in dextrose 5 % 100 mL IVPB, 2 g, Intravenous, On Call Procedure, Gibran Lowe III, MD    lidocaine (PF) 10 mg/ml (1%) injection 10 mg, 1 mL, Intradermal, Once, Gibran Lowe III, MD

## 2019-09-19 ENCOUNTER — TELEPHONE (OUTPATIENT)
Dept: SURGERY | Facility: CLINIC | Age: 54
End: 2019-09-19

## 2020-01-12 DIAGNOSIS — E11.9 TYPE 2 DIABETES MELLITUS WITHOUT COMPLICATION, WITHOUT LONG-TERM CURRENT USE OF INSULIN: ICD-10-CM

## 2020-01-13 RX ORDER — EMPAGLIFLOZIN 10 MG/1
TABLET, FILM COATED ORAL
Qty: 90 TABLET | Refills: 1 | Status: SHIPPED | OUTPATIENT
Start: 2020-01-13 | End: 2020-10-09 | Stop reason: SDUPTHER

## 2020-02-05 DIAGNOSIS — E11.9 TYPE 2 DIABETES MELLITUS WITHOUT COMPLICATION, WITHOUT LONG-TERM CURRENT USE OF INSULIN: ICD-10-CM

## 2020-02-05 RX ORDER — SITAGLIPTIN AND METFORMIN HYDROCHLORIDE 1000; 100 MG/1; MG/1
TABLET, FILM COATED, EXTENDED RELEASE ORAL
Qty: 90 TABLET | Refills: 1 | Status: SHIPPED | OUTPATIENT
Start: 2020-02-05 | End: 2020-08-11 | Stop reason: SDUPTHER

## 2020-02-26 DIAGNOSIS — E11.9 TYPE 2 DIABETES MELLITUS WITHOUT COMPLICATION, WITHOUT LONG-TERM CURRENT USE OF INSULIN: ICD-10-CM

## 2020-02-26 RX ORDER — RAMIPRIL 1.25 MG/1
CAPSULE ORAL
Qty: 40 CAPSULE | Refills: 1 | Status: SHIPPED | OUTPATIENT
Start: 2020-02-26 | End: 2020-08-21 | Stop reason: SDUPTHER

## 2020-08-11 DIAGNOSIS — E11.9 TYPE 2 DIABETES MELLITUS WITHOUT COMPLICATION, WITHOUT LONG-TERM CURRENT USE OF INSULIN: ICD-10-CM

## 2020-08-11 RX ORDER — SITAGLIPTIN AND METFORMIN HYDROCHLORIDE 1000; 100 MG/1; MG/1
1 TABLET, FILM COATED, EXTENDED RELEASE ORAL DAILY
Qty: 30 TABLET | Refills: 1 | Status: SHIPPED | OUTPATIENT
Start: 2020-08-11 | End: 2020-08-28 | Stop reason: SDUPTHER

## 2020-08-21 DIAGNOSIS — E11.9 TYPE 2 DIABETES MELLITUS WITHOUT COMPLICATION, WITHOUT LONG-TERM CURRENT USE OF INSULIN: ICD-10-CM

## 2020-08-21 RX ORDER — RAMIPRIL 1.25 MG/1
1.25 CAPSULE ORAL DAILY
Qty: 90 CAPSULE | Refills: 1 | Status: SHIPPED | OUTPATIENT
Start: 2020-08-21 | End: 2020-08-28 | Stop reason: SDUPTHER

## 2020-08-28 ENCOUNTER — OFFICE VISIT (OUTPATIENT)
Dept: FAMILY MEDICINE | Facility: CLINIC | Age: 55
End: 2020-08-28
Payer: COMMERCIAL

## 2020-08-28 DIAGNOSIS — E11.9 TYPE 2 DIABETES MELLITUS WITHOUT COMPLICATION, WITHOUT LONG-TERM CURRENT USE OF INSULIN: ICD-10-CM

## 2020-08-28 DIAGNOSIS — E78.5 DYSLIPIDEMIA: Primary | ICD-10-CM

## 2020-08-28 DIAGNOSIS — J30.1 SEASONAL ALLERGIC RHINITIS DUE TO POLLEN: ICD-10-CM

## 2020-08-28 PROCEDURE — 99213 PR OFFICE/OUTPT VISIT, EST, LEVL III, 20-29 MIN: ICD-10-PCS | Mod: 95,,, | Performed by: INTERNAL MEDICINE

## 2020-08-28 PROCEDURE — 99213 OFFICE O/P EST LOW 20 MIN: CPT | Mod: 95,,, | Performed by: INTERNAL MEDICINE

## 2020-08-28 PROCEDURE — 3051F HG A1C>EQUAL 7.0%<8.0%: CPT | Mod: 95,,, | Performed by: INTERNAL MEDICINE

## 2020-08-28 PROCEDURE — 3051F PR MOST RECENT HEMOGLOBIN A1C LEVEL 7.0 - < 8.0%: ICD-10-PCS | Mod: 95,,, | Performed by: INTERNAL MEDICINE

## 2020-08-28 RX ORDER — RAMIPRIL 1.25 MG/1
1.25 CAPSULE ORAL DAILY
Qty: 90 CAPSULE | Refills: 1 | Status: SHIPPED | OUTPATIENT
Start: 2020-08-28 | End: 2021-05-07

## 2020-08-28 RX ORDER — AZELASTINE 1 MG/ML
1 SPRAY, METERED NASAL 2 TIMES DAILY
Qty: 30 ML | Refills: 2 | Status: SHIPPED | OUTPATIENT
Start: 2020-08-28 | End: 2021-09-24

## 2020-08-28 RX ORDER — SITAGLIPTIN AND METFORMIN HYDROCHLORIDE 1000; 100 MG/1; MG/1
1 TABLET, FILM COATED, EXTENDED RELEASE ORAL DAILY
Qty: 90 TABLET | Refills: 1 | Status: SHIPPED | OUTPATIENT
Start: 2020-08-28 | End: 2021-04-13 | Stop reason: SDUPTHER

## 2020-08-28 NOTE — PATIENT INSTRUCTIONS
Using a Blood Sugar Log    You have diabetes. This means your body has trouble regulating a sugar called glucose. To help manage your diabetes, youll need to check your blood sugar level as directed by your healthcare provider. Keeping a log of your blood sugar levels will help you track your blood sugar readings. Its a simple and easy way to see how well you are controlling your diabetes.  Checking your blood sugar level  You can check your blood sugar level with a blood glucose meter. Youll first prick the side of your finger with a tiny lancet to draw a tiny drop of blood onto the test strip. Some glucose meters let you use another place on your body to test. But these other places should not be used in some cases as they may be inaccurate. Follow the instructions for your glucose meter. And talk with your healthcare provider before doing the test on other places.  The strip goes into the meter first, then a drop of blood is placed on the tip of the strip. The meter then shows a reading that tells you the level of your blood sugar. Your readings should be in your target range as often as possible. This means not too high or too low. Staying in this range helps lower your risk for complications. Your healthcare provider will help you figure out the target range that is best for you.  Tracking your readings  Every time you check your blood sugar, use your log to keep track of your readings. Your meter will also probably have a memory feature that your healthcare provider can check at your next visit. You may be advised by your healthcare provider to check your blood sugar in the morning, at bedtime, and before and after meals. Be sure to write down all of your numbers. Also use your log to record things that might have affected your blood sugar. Some examples include being sick, certain medicines, being physically active, feeling stressed, or skipping meals.   Lessons learned from your readings  Tracking your  blood sugar readings helps you see patterns. These patterns tell you how your actions affect your blood sugar. For instance, you may have higher numbers after eating certain foods or lower numbers after exercise. They just help you understand how to stay in your target range more often, so that your diabetes remains in good control.  Sharing your log with your healthcare team  Bring your blood sugar log and glucose meter with you to all of your healthcare appointments. This can help your healthcare team make changes to your treatment plan, if needed. This may involve making changes in what you eat, what medicines you take, or how much you exercise.  To learn more  The resources below can help you learn more:  · American Diabetes Association 260-000-5897 www.diabetes.org  · [employer] 580-706-9354 www.lighthouse.org  · [employer] 811-260-2431 www.nei.nih.gov  · [employer] 959-498-2597 www.hormone.org  Date Last Reviewed: 5/1/2016  © 3458-3028 [employer]. [address] [place] [address]. All rights reserved. This information is not intended as a substitute for professional medical care. Always follow your healthcare professional's instructions.        Allergic Rhinitis  Allergic rhinitis is an allergic reaction that affects the nose, and often the eyes. Its often known as nasal allergies. Nasal allergies are often due to things in the environment that are breathed in. Depending what you are sensitive to, nasal allergies may occur only during certain seasons. Or they may occur year round. Common indoor allergens include house dust mites, mold, cockroaches, and pet dander. Outdoor allergens include pollen from trees, grasses, and weeds.   Symptoms include a drippy, stuffy, and itchy nose. They also include sneezing and red and itchy eyes. You may feel tired more often. Severe allergies may also affect your breathing and trigger a condition called  asthma.   Tests can be done to see what allergens are affecting you. You may be referred to an allergy specialist for testing and further evaluation.  Home care  Your healthcare provider may prescribe medicines to help relieve allergy symptoms. These may include oral medicines, nasal sprays, or eye drops.  Ask your provider for advice on how to avoid substances that you are allergic to. Below are a few tips for each type of allergen.  Pet dander:  · Do not have pets with fur and feathers.  · If you can't avoid having a pet, keep it out of your bedroom and off upholstered furniture.  Pollen:  · When pollen counts are high, keep windows of your car and home closed. If possible, use an air conditioner instead.  · Wear a filter mask when mowing or doing yard work.  House dust mites:  · Wash bedding every week in warm water and detergent and dry on a hot setting.  · Cover the mattress, box spring, and pillows with allergy covers.   · If possible, sleep in a room with no carpet, curtains, or upholstered furniture.  Cockroaches:  · Store food in sealed containers.  · Remove garbage from the home promptly.  · Fix water leaks  Mold:  · Keep humidity low by using a dehumidifier or air conditioner. Keep the dehumidifier and air conditioner clean and free of mold.  · Clean moldy areas with bleach and water.  In general:  · Vacuum once or twice a week. If possible, use a vacuum with a high-efficiency particulate air (HEPA) filter.  · Do not smoke. Avoid cigarette smoke. Cigarette smoke is an irritant that can make symptoms worse.  Follow-up care  Follow up as advised by the healthcare provider or our staff. If you were referred to an allergy specialist, make this appointment promptly.  When to seek medical advice  Call your healthcare provider right away if the following occur:  · Coughing or wheezing  · Fever greater than 100.4°F (38°C)  · Hives (raised red bumps)  · Continuing symptoms, new symptoms, or worsening  symptoms  Call 911 right away if you have:  · Trouble breathing  · Severe swelling of the face or severe itching of the eyes or mouth  Date Last Reviewed: 3/1/2017  © 0367-3021 [employer]. [address] [place] [address], [place] [address]. All rights reserved. This information is not intended as a substitute for professional medical care. Always follow your healthcare professional's instructions.

## 2020-08-28 NOTE — PROGRESS NOTES
Subjective:      Chief Complaint   Patient presents with    Diabetes    Hyperlipidemia    Sinus Problem       The chief complaint leading to consultation is:  Diabetes mellitus, hyperlipidemia and sinus problems.  The patient location is:  Home  Visit type: Virtual visit with synchronous audio/video or audio only  This was a video visit in lieu of in-person visit due to the coronavirus emergency. Patient acknowledged and consented to the video visit encounter.     This is a virtual consultation for patient Jeramie prajapati who is a 54-year-old  male.  Reason for virtual consultation is currently prevailing COVID-19 pandemic.    This is a longstanding and pending follow-up visit possibly delayed due to COVID-19.  Last time he was seen in 2019.    Underlying medical issues of dyslipidemia, diabetes mellitus have been noted.  He is on ramipril 1.25 mg for renal protection and not necessarily for any blood pressure elevations.    His control of diabetes seems to be reasonable at this point.  Sugars are slightly more elevated because of staying at home during the COVID-19 pandemic.    He has been complaining of some sinus congestion, postnasal drip and some sinus pressure.  He does take Flonase but without much relief.  There is no fever.  There is no loss of smell or taste.  There is no contact or definitive risk for COVID-19 at this point.    AM sugar 155    Diabetes  He has type 2 diabetes mellitus. No MedicAlert identification noted. The initial diagnosis of diabetes was made 15 years ago. Pertinent negatives for hypoglycemia include no confusion, headaches, hunger, mood changes, nervousness/anxiousness, pallor, seizures, sleepiness, speech difficulty, sweats or tremors. Pertinent negatives for diabetes include no blurred vision, no chest pain, no fatigue, no foot paresthesias, no foot ulcerations, no polydipsia, no polyphagia, no polyuria, no visual change, no weakness and no weight loss. Pertinent  negatives for hypoglycemia complications include no blackouts, no hospitalization, no nocturnal hypoglycemia, no required assistance and no required glucagon injection. Symptoms are stable. Pertinent negatives for diabetic complications include no autonomic neuropathy, CVA, heart disease, impotence, nephropathy, peripheral neuropathy, PVD or retinopathy. Risk factors for coronary artery disease include family history, diabetes mellitus and male sex. Current diabetic treatment includes oral agent (dual therapy). He is compliant with treatment most of the time. His weight is stable. He is following a generally healthy diet. Meal planning includes carbohydrate counting. He has not had a previous visit with a dietitian. He participates in exercise intermittently. He monitors blood glucose at home 1-2 x per week. Blood glucose monitoring compliance is inadequate. There is no change in his home blood glucose trend. He does not see a podiatrist.Eye exam is not current.   Hyperlipidemia  This is a chronic problem. The current episode started more than 1 year ago. The problem is controlled. He has no history of obesity. Pertinent negatives include no chest pain, myalgias or shortness of breath. Current antihyperlipidemic treatment includes statins. The current treatment provides moderate improvement of lipids. Compliance problems include psychosocial issues.  Risk factors for coronary artery disease include diabetes mellitus, dyslipidemia and male sex.   Sinus Problem  This is a new problem. The current episode started 1 to 4 weeks ago. The problem has been waxing and waning since onset. There has been no fever. Associated symptoms include congestion. Pertinent negatives include no chills, coughing, diaphoresis, ear pain, headaches, shortness of breath, sneezing or sore throat. Treatments tried: Flonase nasal spray. The treatment provided no relief.       History reviewed. No pertinent surgical history.  Past Medical  History:   Diagnosis Date    Anxiety     Diabetes mellitus, type 2      Family History   Problem Relation Age of Onset    Heart disease Mother     Diabetes Father         Social History:   Marital Status:   Alcohol History:  reports no history of alcohol use.  Tobacco History:  reports that he has never smoked. He has quit using smokeless tobacco.  His smokeless tobacco use included chew.  Drug History:  reports no history of drug use.    Review of patient's allergies indicates:  No Known Allergies    Current Outpatient Medications   Medication Sig Dispense Refill    aspirin (ECOTRIN) 81 MG EC tablet Take 1 tablet (81 mg total) by mouth Daily. 100 tablet 1    atorvastatin (LIPITOR) 10 MG tablet TAKE 1/2 TABLET BY MOUTH THREE TIMES A WEEK AT BEDTIME. 18 tablet 2    azelastine (ASTELIN) 137 mcg (0.1 %) nasal spray 1 spray (137 mcg total) by Nasal route 2 (two) times daily. 30 mL 2    blood sugar diagnostic (BLOOD GLUCOSE TEST) Strp 1 each by Other route.      JARDIANCE 10 mg Tab TAKE 1 TABLET BY MOUTH ONCE DAILY 90 tablet 1    ramipriL (ALTACE) 1.25 MG capsule Take 1 capsule (1.25 mg total) by mouth once daily. 90 capsule 1    SITagliptan-metformin (JANUMET XR) 100-1,000 mg TM24 Take 1 tablet by mouth once daily. 90 tablet 1     Current Facility-Administered Medications   Medication Dose Route Frequency Provider Last Rate Last Dose    ceFOXItin (MEFOXIN) 2 g in dextrose 5 % 100 mL IVPB  2 g Intravenous On Call Procedure Gibran Lowe III, MD        lidocaine (PF) 10 mg/ml (1%) injection 10 mg  1 mL Intradermal Once Gibran Lowe III, MD           Review of Systems   Constitutional: Negative for activity change, appetite change, chills, diaphoresis, fatigue, fever, unexpected weight change and weight loss.   HENT: Positive for congestion and postnasal drip. Negative for ear pain, sneezing, sore throat and trouble swallowing.    Eyes: Negative for blurred vision, pain, redness and visual  disturbance.   Respiratory: Negative for cough, chest tightness and shortness of breath.    Cardiovascular: Negative for chest pain and leg swelling.        Patient is on ACE inhibitor to protect his kidneys. He has normal blood pressure otherwise.   Gastrointestinal: Negative for abdominal distention, abdominal pain, blood in stool, constipation and diarrhea.   Endocrine: Negative for cold intolerance, heat intolerance, polydipsia, polyphagia and polyuria.        Patient has mild dyslipidemia. Patient also has diabetes mellitus type 2.   Genitourinary: Negative for dysuria, frequency, hematuria, impotence and scrotal swelling.   Musculoskeletal: Negative for arthralgias, back pain, gait problem and myalgias.   Skin: Negative for pallor, rash and wound.   Allergic/Immunologic: Negative for environmental allergies, food allergies and immunocompromised state.   Neurological: Negative for tremors, seizures, speech difficulty, weakness and headaches.   Hematological: Negative for adenopathy. Does not bruise/bleed easily.   Psychiatric/Behavioral: Negative for confusion. The patient is not nervous/anxious.          Objective:      Vital signs are not available today.  Physical Exam:   Physical Exam  Constitutional:       Comments: Patient appears to be awake, alert and oriented.  No distress.   HENT:      Head:      Comments: Patient did press upon his sinuses and forehead and did not experience any significant tenderness.             Assessment:       1. Dyslipidemia    2. Type 2 diabetes mellitus without complication, without long-term current use of insulin    3. Seasonal allergic rhinitis due to pollen      Plan:   Dyslipidemia  -     Comprehensive metabolic panel; Future; Expected date: 08/28/2020  -     Lipid Panel; Future; Expected date: 08/28/2020    Type 2 diabetes mellitus without complication, without long-term current use of insulin  -     ramipriL (ALTACE) 1.25 MG capsule; Take 1 capsule (1.25 mg total) by  mouth once daily.  Dispense: 90 capsule; Refill: 1  -     Hemoglobin A1C; Future; Expected date: 08/28/2020  -     Microalbumin/creatinine urine ratio; Future; Expected date: 08/28/2020  -     SITagliptan-metformin (JANUMET XR) 100-1,000 mg TM24; Take 1 tablet by mouth once daily.  Dispense: 90 tablet; Refill: 1    Seasonal allergic rhinitis due to pollen  -     azelastine (ASTELIN) 137 mcg (0.1 %) nasal spray; 1 spray (137 mcg total) by Nasal route 2 (two) times daily.  Dispense: 30 mL; Refill: 2     Patient's follow-up visit today has been noted.  We have caught up on his medical issues of diabetes mellitus and hyperlipidemia.  His last visit was in September 2019.    Medications have been refilled.    Labs have been ordered.    Astelin has been added on top of Flonase.  Let us see how he does with the sinuses.  Salt water gargles and steam inhalations have been recommended.    COVID-19 precautions have been reinforced.    Advised Mr. Pérez to monitor Blood sugars at home and record them.  Exercise, watch diet and loose weight.  keep a close eye on feet and keep them clean. Annual eye examination. Annual influenza vaccine.  Monitor HgbA1c every 3 to 6 months. Monitor urine microalbumin every year.keep LDL less than 100. Monitor blood pressure and target blood pressure 120/70.        Advised Mr. Pérez about age and season appropriate immunizations/ cancer screenings.  Also seasonal influenza vaccine, update on tetanus diphtheria vaccination every 10 years.  Patient has been advised to monitor his vital signs also.    Limitations of virtual visit also have been acknowledged.  Follow up in about 3 months (around 11/28/2020) for Diabetes/HTN/Lipids.    Total time spent with patient: 15-20 mts    Each patient to whom he or she provides medical services by telemedicine is:  (1) informed of the relationship between the physician and patient and the respective role of any other health care provider with respect to  management of the patient; and (2) notified that he or she may decline to receive medical services by telemedicine and may withdraw from such care at any time.    This note was created using [employer] voice recognition software that occasionally misinterprets phrases or words.   Current Outpatient Medications on File Prior to Visit   Medication Sig Dispense Refill    aspirin (ECOTRIN) 81 MG EC tablet Take 1 tablet (81 mg total) by mouth Daily. 100 tablet 1    atorvastatin (LIPITOR) 10 MG tablet TAKE 1/2 TABLET BY MOUTH THREE TIMES A WEEK AT BEDTIME. 18 tablet 2    blood sugar diagnostic (BLOOD GLUCOSE TEST) Strp 1 each by Other route.      JARDIANCE 10 mg Tab TAKE 1 TABLET BY MOUTH ONCE DAILY 90 tablet 1    [DISCONTINUED] ramipriL (ALTACE) 1.25 MG capsule Take 1 capsule (1.25 mg total) by mouth once daily. 90 capsule 1    [DISCONTINUED] SITagliptan-metformin (JANUMET XR) 100-1,000 mg TM24 Take 1 tablet by mouth once daily. 30 tablet 1     Current Facility-Administered Medications on File Prior to Visit   Medication Dose Route Frequency Provider Last Rate Last Dose    ceFOXItin (MEFOXIN) 2 g in dextrose 5 % 100 mL IVPB  2 g Intravenous On Call Procedure Gibran Lowe III, MD        lidocaine (PF) 10 mg/ml (1%) injection 10 mg  1 mL Intradermal Once Gibran Lowe III, MD

## 2020-10-09 DIAGNOSIS — E11.9 TYPE 2 DIABETES MELLITUS WITHOUT COMPLICATION, WITHOUT LONG-TERM CURRENT USE OF INSULIN: ICD-10-CM

## 2020-10-09 RX ORDER — EMPAGLIFLOZIN 10 MG/1
10 TABLET, FILM COATED ORAL DAILY
Qty: 90 TABLET | Refills: 1 | Status: SHIPPED | OUTPATIENT
Start: 2020-10-09 | End: 2020-10-12 | Stop reason: SDUPTHER

## 2020-10-12 DIAGNOSIS — E11.9 TYPE 2 DIABETES MELLITUS WITHOUT COMPLICATION, WITHOUT LONG-TERM CURRENT USE OF INSULIN: ICD-10-CM

## 2020-10-12 RX ORDER — EMPAGLIFLOZIN 10 MG/1
10 TABLET, FILM COATED ORAL DAILY
Qty: 90 TABLET | Refills: 1 | Status: SHIPPED | OUTPATIENT
Start: 2020-10-12 | End: 2020-12-10 | Stop reason: SINTOL

## 2020-11-06 LAB
ALBUMIN SERPL-MCNC: 4.7 G/DL (ref 3.8–4.9)
ALBUMIN/CREAT UR: 19 MG/G CREAT (ref 0–29)
ALBUMIN/GLOB SERPL: 2 {RATIO} (ref 1.2–2.2)
ALP SERPL-CCNC: 60 IU/L (ref 39–117)
ALT SERPL-CCNC: 25 IU/L (ref 0–44)
AST SERPL-CCNC: 20 IU/L (ref 0–40)
BILIRUB SERPL-MCNC: 0.8 MG/DL (ref 0–1.2)
BUN SERPL-MCNC: 11 MG/DL (ref 6–24)
BUN/CREAT SERPL: 14 (ref 9–20)
CALCIUM SERPL-MCNC: 9 MG/DL (ref 8.7–10.2)
CHLORIDE SERPL-SCNC: 106 MMOL/L (ref 96–106)
CHOLEST SERPL-MCNC: 216 MG/DL (ref 100–199)
CO2 SERPL-SCNC: 21 MMOL/L (ref 20–29)
CREAT SERPL-MCNC: 0.78 MG/DL (ref 0.76–1.27)
CREAT UR-MCNC: 112.6 MG/DL
GLOBULIN SER CALC-MCNC: 2.3 G/DL (ref 1.5–4.5)
GLUCOSE SERPL-MCNC: 142 MG/DL (ref 65–99)
HBA1C MFR BLD: 8.6 % (ref 4.8–5.6)
HDLC SERPL-MCNC: 40 MG/DL
LDLC SERPL CALC-MCNC: 141 MG/DL (ref 0–99)
MICROALBUMIN UR-MCNC: 21.9 UG/ML
POTASSIUM SERPL-SCNC: 4.6 MMOL/L (ref 3.5–5.2)
PROT SERPL-MCNC: 7 G/DL (ref 6–8.5)
SODIUM SERPL-SCNC: 139 MMOL/L (ref 134–144)
TRIGL SERPL-MCNC: 193 MG/DL (ref 0–149)
VLDLC SERPL CALC-MCNC: 35 MG/DL (ref 5–40)

## 2020-11-07 PROBLEM — Z98.890 HISTORY OF COLONOSCOPY: Status: ACTIVE | Noted: 2020-11-05

## 2020-12-10 ENCOUNTER — OFFICE VISIT (OUTPATIENT)
Dept: FAMILY MEDICINE | Facility: CLINIC | Age: 55
End: 2020-12-10
Payer: COMMERCIAL

## 2020-12-10 VITALS
TEMPERATURE: 97 F | BODY MASS INDEX: 24.71 KG/M2 | SYSTOLIC BLOOD PRESSURE: 121 MMHG | DIASTOLIC BLOOD PRESSURE: 75 MMHG | WEIGHT: 163 LBS | HEART RATE: 74 BPM | HEIGHT: 68 IN

## 2020-12-10 DIAGNOSIS — M65.332 TRIGGER MIDDLE FINGER OF LEFT HAND: ICD-10-CM

## 2020-12-10 DIAGNOSIS — Z23 NEED FOR INFLUENZA VACCINATION: ICD-10-CM

## 2020-12-10 DIAGNOSIS — E11.9 TYPE 2 DIABETES MELLITUS WITHOUT COMPLICATION, WITHOUT LONG-TERM CURRENT USE OF INSULIN: Primary | Chronic | ICD-10-CM

## 2020-12-10 DIAGNOSIS — E78.5 DYSLIPIDEMIA: Chronic | ICD-10-CM

## 2020-12-10 PROBLEM — R10.11 RIGHT UPPER QUADRANT ABDOMINAL PAIN: Status: RESOLVED | Noted: 2019-05-27 | Resolved: 2020-12-10

## 2020-12-10 PROCEDURE — 99214 OFFICE O/P EST MOD 30 MIN: CPT | Mod: 25,S$GLB,, | Performed by: INTERNAL MEDICINE

## 2020-12-10 PROCEDURE — 3008F BODY MASS INDEX DOCD: CPT | Mod: S$GLB,,, | Performed by: INTERNAL MEDICINE

## 2020-12-10 PROCEDURE — 99214 PR OFFICE/OUTPT VISIT, EST, LEVL IV, 30-39 MIN: ICD-10-PCS | Mod: 25,S$GLB,, | Performed by: INTERNAL MEDICINE

## 2020-12-10 PROCEDURE — 90471 IMMUNIZATION ADMIN: CPT | Mod: S$GLB,,, | Performed by: INTERNAL MEDICINE

## 2020-12-10 PROCEDURE — 90686 IIV4 VACC NO PRSV 0.5 ML IM: CPT | Mod: S$GLB,,, | Performed by: INTERNAL MEDICINE

## 2020-12-10 PROCEDURE — 1126F PR PAIN SEVERITY QUANTIFIED, NO PAIN PRESENT: ICD-10-PCS | Mod: S$GLB,,, | Performed by: INTERNAL MEDICINE

## 2020-12-10 PROCEDURE — 3008F PR BODY MASS INDEX (BMI) DOCUMENTED: ICD-10-PCS | Mod: S$GLB,,, | Performed by: INTERNAL MEDICINE

## 2020-12-10 PROCEDURE — 90686 FLU VACCINE (QUAD) GREATER THAN OR EQUAL TO 3YO PRESERVATIVE FREE IM: ICD-10-PCS | Mod: S$GLB,,, | Performed by: INTERNAL MEDICINE

## 2020-12-10 PROCEDURE — 1126F AMNT PAIN NOTED NONE PRSNT: CPT | Mod: S$GLB,,, | Performed by: INTERNAL MEDICINE

## 2020-12-10 PROCEDURE — 3052F PR MOST RECENT HEMOGLOBIN A1C LEVEL 8.0 - < 9.0%: ICD-10-PCS | Mod: S$GLB,,, | Performed by: INTERNAL MEDICINE

## 2020-12-10 PROCEDURE — 3052F HG A1C>EQUAL 8.0%<EQUAL 9.0%: CPT | Mod: S$GLB,,, | Performed by: INTERNAL MEDICINE

## 2020-12-10 PROCEDURE — 90471 FLU VACCINE (QUAD) GREATER THAN OR EQUAL TO 3YO PRESERVATIVE FREE IM: ICD-10-PCS | Mod: S$GLB,,, | Performed by: INTERNAL MEDICINE

## 2020-12-10 NOTE — PROGRESS NOTES
Subjective:       Patient ID: Pedro Luis Pérez is a 55 y.o. male.    Chief Complaint: Diabetes (stopped some meds ), Hand Pain (stift fingers ), and Hyperlipidemia    Mr. Jeramie Pérez is a 55-year-old gentleman who comes for follow-up.  The last time I had seen him office was in September 2019.  We did have a virtual consultation in August 2020.  Because of the COVID-19 he could not keep follow-up.  Underlying medical issues of type 2 diabetes mellitus, dyslipidemia have been noted.    As far as diabetes is concerned, he is on a combination of Janumet and Jardiance.  However, he could not tolerate the side effects Jardiance with going to the bathroom frequently.  He stopped it.  In the interim because of COVID 19 isolation and other issues his diet was not that great and his sugars went up.  His hemoglobin A1c again crossed greater than 8.  He has started a new diet called intermittent fasting and he states that he is doing fairly well with the sugars coming to 115 is a or so.    Incidentally his cholesterol level also has gone up recently.  He is taking atorvastatin 10 mg at bedtime.    Sinus seems to be doing okay with Astelin nasal spray.    He is also taking ramipril for kidney protection.    Diabetes  He presents for his follow-up diabetic visit. He has type 2 diabetes mellitus. No MedicAlert identification noted. His disease course has been worsening. There are no hypoglycemic associated symptoms. Pertinent negatives for hypoglycemia include no confusion, headaches, nervousness/anxiousness, pallor, seizures, speech difficulty or tremors. Pertinent negatives for diabetes include no chest pain, no fatigue, no polydipsia, no polyphagia, no polyuria and no weakness. Symptoms are worsening. Risk factors for coronary artery disease include diabetes mellitus, male sex and dyslipidemia. Current diabetic treatment includes oral agent (dual therapy). He is compliant with treatment most of the time. His weight is stable.  Meal planning includes avoidance of concentrated sweets. He has not had a previous visit with a dietitian. He rarely participates in exercise. His breakfast blood glucose range is generally 110-130 mg/dl. An ACE inhibitor/angiotensin II receptor blocker is not being taken (Patient has been advised to take the ramipril to protect his kidneys.). He does not see a podiatrist.Eye exam is not current.   Hand Pain   The incident occurred more than 1 week ago. There was no injury mechanism. The quality of the pain is described as cramping. The pain is mild. Pertinent negatives include no chest pain.   Hyperlipidemia  This is a chronic problem. The current episode started more than 1 year ago. The problem is uncontrolled. Exacerbating diseases include diabetes. He has no history of hypothyroidism, liver disease or obesity. Pertinent negatives include no chest pain, myalgias or shortness of breath. Current antihyperlipidemic treatment includes statins. The current treatment provides mild improvement of lipids. Risk factors for coronary artery disease include a sedentary lifestyle, male sex, dyslipidemia and diabetes mellitus.       Past Medical History:   Diagnosis Date    Anxiety     Diabetes mellitus, type 2     History of colonoscopy 11/5/2020    Dr. Stephanie Morin.  Entire portion of the ileum was normal.  Nonbleeding internal hemorrhoids.  External examined colon is normal.  No specimens collected.  Repeat colonoscopy in 5 years for surveillance.     Social History     Socioeconomic History    Marital status:      Spouse name: Regla    Number of children: 4    Years of education: Not on file    Highest education level: Not on file   Occupational History    Occupation: Self Employed- [employer]   Social Needs    Financial resource strain: Not hard at all    Food insecurity     Worry: Never true     Inability: Never true    Transportation needs     Medical: No     Non-medical: No   Tobacco Use     Smoking status: Never Smoker    Smokeless tobacco: Former User     Types: Chew   Substance and Sexual Activity    Alcohol use: No     Frequency: Never    Drug use: No    Sexual activity: Yes     Partners: Female   Lifestyle    Physical activity     Days per week: 2 days     Minutes per session: 30 min    Stress: Not at all   Relationships    Social connections     Talks on phone: More than three times a week     Gets together: More than three times a week     Attends Islam service: Not on file     Active member of club or organization: Yes     Attends meetings of clubs or organizations: More than 4 times per year     Relationship status:    Other Topics Concern    Not on file   Social History Narrative    Not on file     History reviewed. No pertinent surgical history.  Family History   Problem Relation Age of Onset    Heart disease Mother     Diabetes Father        Review of Systems   Constitutional: Negative for activity change, appetite change, chills, diaphoresis, fatigue, fever and unexpected weight change.   HENT: Negative for congestion, ear pain, postnasal drip, sneezing, sore throat and trouble swallowing.    Eyes: Negative for pain, redness and visual disturbance.   Respiratory: Negative for cough, chest tightness and shortness of breath.    Cardiovascular: Negative for chest pain and leg swelling.        Patient is on ACE inhibitor to protect his kidneys. He has normal blood pressure otherwise.   Gastrointestinal: Negative for abdominal distention, abdominal pain, blood in stool, constipation and diarrhea.   Endocrine: Negative for cold intolerance, heat intolerance, polydipsia, polyphagia and polyuria.        Patient has mild dyslipidemia. Patient also has diabetes mellitus type 2.  Hemoglobin A1c is greater than 8.  Lipid panel is also on controlled.   Genitourinary: Negative for dysuria, frequency, hematuria and scrotal swelling.   Musculoskeletal: Negative for arthralgias, back  "pain, gait problem and myalgias.        Trigger finger left 3rd digit.   Skin: Negative for color change, pallor, rash and wound.   Allergic/Immunologic: Negative for environmental allergies, food allergies and immunocompromised state.   Neurological: Negative for tremors, seizures, speech difficulty, weakness, light-headedness and headaches.   Hematological: Negative for adenopathy. Does not bruise/bleed easily.   Psychiatric/Behavioral: Negative for confusion and hallucinations. The patient is not nervous/anxious.          Objective:      Blood pressure 121/75, pulse 74, temperature 97.3 °F (36.3 °C), height 5' 8" (1.727 m), weight 73.9 kg (163 lb). Body mass index is 24.78 kg/m².  Physical Exam  Vitals signs and nursing note reviewed.   Constitutional:       General: He is not in acute distress.     Appearance: He is well-developed.      Comments: BMI is 24.78   HENT:      Head: Normocephalic and atraumatic.      Nose: Nose normal.      Mouth/Throat:      Pharynx: No oropharyngeal exudate.   Eyes:      Conjunctiva/sclera: Conjunctivae normal.   Neck:      Musculoskeletal: Normal range of motion and neck supple.      Thyroid: No thyromegaly.      Vascular: No JVD.      Trachea: No tracheal deviation.   Cardiovascular:      Rate and Rhythm: Normal rate and regular rhythm.      Pulses:           Dorsalis pedis pulses are 1+ on the right side and 1+ on the left side.        Posterior tibial pulses are 1+ on the right side and 1+ on the left side.      Heart sounds: Normal heart sounds. No murmur. No friction rub. No gallop.    Pulmonary:      Effort: Pulmonary effort is normal. No respiratory distress.      Breath sounds: Normal breath sounds. No wheezing or rales.   Abdominal:      General: Bowel sounds are normal. There is no distension.      Palpations: Abdomen is soft.      Tenderness: There is no abdominal tenderness.   Musculoskeletal:         General: No tenderness or deformity.        Hands:       Right foot: " Normal range of motion. No deformity.      Left foot: Normal range of motion. No deformity.   Feet:      Right foot:      Protective Sensation: 3 sites tested. 3 sites sensed.      Skin integrity: No ulcer or blister.      Left foot:      Protective Sensation: 3 sites tested. 3 sites sensed.      Skin integrity: No ulcer or blister.   Skin:     General: Skin is warm and dry.   Neurological:      Mental Status: He is alert and oriented to person, place, and time.      Deep Tendon Reflexes: Reflexes are normal and symmetric.           Assessment:       1. Type 2 diabetes mellitus without complication, without long-term current use of insulin    2. Dyslipidemia    3. Trigger middle finger of left hand           No visits with results within 3 Month(s) from this visit.   Latest known visit with results is:   Office Visit on 08/28/2020   Component Date Value Ref Range Status    Hemoglobin A1C 11/05/2020 8.6* 4.8 - 5.6 % Final    Glucose 11/05/2020 142* 65 - 99 mg/dL Final    BUN 11/05/2020 11  6 - 24 mg/dL Final    Creatinine 11/05/2020 0.78  0.76 - 1.27 mg/dL Final    eGFR if non African American 11/05/2020 100  >59 mL/min/1.73 Final    eGFR if  11/05/2020 116  >59 mL/min/1.73 Final    BUN/Creatinine Ratio 11/05/2020 14  9 - 20 Final    Sodium 11/05/2020 139  134 - 144 mmol/L Final    Potassium 11/05/2020 4.6  3.5 - 5.2 mmol/L Final    Chloride 11/05/2020 106  96 - 106 mmol/L Final    CO2 11/05/2020 21  20 - 29 mmol/L Final    Calcium 11/05/2020 9.0  8.7 - 10.2 mg/dL Final    Protein, Total 11/05/2020 7.0  6.0 - 8.5 g/dL Final    Albumin 11/05/2020 4.7  3.8 - 4.9 g/dL Final    Globulin, Total 11/05/2020 2.3  1.5 - 4.5 g/dL Final    Albumin/Globulin Ratio 11/05/2020 2.0  1.2 - 2.2 Final    Total Bilirubin 11/05/2020 0.8  0.0 - 1.2 mg/dL Final    Alkaline Phosphatase 11/05/2020 60  39 - 117 IU/L Final    AST 11/05/2020 20  0 - 40 IU/L Final    ALT 11/05/2020 25  0 - 44 IU/L Final     Creatinine, Urine 11/05/2020 112.6  Not Estab. mg/dL Final    Microalb, Ur 11/05/2020 21.9  Not Estab. ug/mL Final    Microalb/Crt. Ratio 11/05/2020 19  0 - 29 mg/g creat Final    Cholesterol 11/05/2020 216* 100 - 199 mg/dL Final    Triglycerides 11/05/2020 193* 0 - 149 mg/dL Final    HDL 11/05/2020 40  >39 mg/dL Final    VLDL Cholesterol Blaze 11/05/2020 35  5 - 40 mg/dL Final    LDL Calculated 11/05/2020 141* 0 - 99 mg/dL Final         Plan:           Type 2 diabetes mellitus without complication, without long-term current use of insulin  Comments:  Patient's hemoglobin A1c has been the highest recently.  He has changed his diet to an intermittent fasting and he wants to wait for the results.  I am okay wit    Dyslipidemia  Comments:  Cholesterol levels have gone high at this point.  He has changed his diet to intermittent fasting.  Will wait to check the next levels.    Trigger middle finger of left hand  Comments:  I have given him some information on the trigger finger and will give him a referral to Dr. Orlando Esquivel MD Orthopedics hand specialist.  Orders:  -     Ambulatory referral/consult to Orthopedics; Future; Expected date: 12/17/2020      Today  will discontinue Jardiance as an intolerance.    He is currently taking only Janumet.  His hemoglobin A1c has gone high and he has started a new diet and he is very confident about the new diet.  Let us see how he does in the next 4 months.    I will encourage him to continue walking and exercising.    New complain of trigger finger has been noted and I have made a referral to orthopedic for the same.  He may need some icing and exercise and probably a cortisone injection.  I have also given him some information on trigger finger.    He continues or Lipitor 10 mg but his cholesterol level has gone up but let us see how he does with the new diet and will repeat the lipid panel in 4 months time.    I have encouraged him to continue with ramipril which is  actually for blood pressure but in his case the blood pressures are good but it will protect his kidneys.    I have asked him to consider updating himself on eye examination.    I have also asked him to consider taking flu shots and pneumonia shots which will protect him against this infections.  Also continue with COVID-19 precautions.  Him being diabetic puts him at increased risk for getting this infection and putting him at more risk for complications.    Otherwise I wish him and his family all the best for the Christmas and new year.  Stay safe.    Follow-up in 4 months.    Spent yamileth 25 minutes with patient which involved review of pts medical conditions, labs, medications and with 50% of time face-to-face discussion about medical problems, management and any applicable changes.        Current Outpatient Medications:     aspirin (ECOTRIN) 81 MG EC tablet, Take 1 tablet (81 mg total) by mouth Daily., Disp: 100 tablet, Rfl: 1    azelastine (ASTELIN) 137 mcg (0.1 %) nasal spray, 1 spray (137 mcg total) by Nasal route 2 (two) times daily., Disp: 30 mL, Rfl: 2    blood sugar diagnostic (BLOOD GLUCOSE TEST) Strp, 1 each by Other route., Disp: , Rfl:     SITagliptan-metformin (JANUMET XR) 100-1,000 mg TM24, Take 1 tablet by mouth once daily., Disp: 90 tablet, Rfl: 1    atorvastatin (LIPITOR) 10 MG tablet, TAKE 1/2 TABLET BY MOUTH THREE TIMES A WEEK AT BEDTIME. (Patient not taking: Reported on 12/10/2020), Disp: 18 tablet, Rfl: 2    empagliflozin (JARDIANCE) 10 mg tablet, Take 1 tablet (10 mg total) by mouth once daily. (Patient not taking: Reported on 12/10/2020), Disp: 90 tablet, Rfl: 1    ramipriL (ALTACE) 1.25 MG capsule, Take 1 capsule (1.25 mg total) by mouth once daily. (Patient not taking: Reported on 12/10/2020), Disp: 90 capsule, Rfl: 1    Current Facility-Administered Medications:     ceFOXItin (MEFOXIN) 2 g in dextrose 5 % 100 mL IVPB, 2 g, Intravenous, On Call Procedure, Gibran Lowe III,  MD    lidocaine (PF) 10 mg/ml (1%) injection 10 mg, 1 mL, Intradermal, Once, Gibran Lowe III, MD

## 2020-12-10 NOTE — PATIENT INSTRUCTIONS
What is Trigger Finger?  Trigger finger is an inflammation of tissue inside your finger or thumb. It is also called tenosynovitis (ten-[id number]-oh-VY-[id number]). Tendons (cordlike fibers that attach muscle to bone and allow you to bend the joints) become swollen. So does the synovium (a slick membrane that allows the tendons to move easily). This makes it difficult to straighten the finger or thumb.    Causes  Repeated use of a tool with strong gripping, such as a drill or wrench, can irritate and inflame the tendons and the synovium. It is also more common in certain medical conditions such as rheumatoid arthritis, gout, and diabetes. But often the cause of trigger finger is unknown.  Inside your finger  Tendons connect muscles in your forearm to the bones in your fingers. The tendons in each finger are surrounded by a protective tendon sheath. This sheath is lined with synovium, which produces a fluid that allows the tendons to slide easily when you bend and straighten the finger. If a tendon is irritated, it becomes inflamed.  When a tendon is inflamed  When a tendon is inflamed, it causes the lining of the tendon sheath to swell and thicken. Or the tendon itself may thicken. Then the sheath pinches the tendon, and the tendon can no longer slide easily inside the sheath. When you straighten your finger, the tendon sticks or locks as it tries to squeeze back through the sheath.    Symptoms  The first sign of trigger finger may be pain where the finger or thumb joins the palm. You may also notice some swelling. As the tendon becomes more inflamed, the finger may start to catch when you try to straighten it. When the locked tendon releases, the finger jumps, as if you were releasing the trigger of a gun. This further irritates the tendon, and may set up a cycle of catching and swelling.   Date Last Reviewed: 9/28/2015  © 8928-9970 [employer]. [address] [place] [address], [place] [address]. All rights reserved.  This information is not intended as a substitute for professional medical care. Always follow your healthcare professional's instructions.        Treating Trigger Finger     The tendon sheath is opened to release the tendon. Once the tendon can move freely again, the finger can bend and straighten more normally.     Trigger finger occurs when the tissue inside your finger or thumb becomes inflamed. Mild cases can be treated without surgery. If the problem is severe, surgery may be needed. Your doctor will discuss your options with you.  Nonsurgical treatment  For mild symptoms, your doctor may have you rest the finger or thumb. You may also be told to take anti-inflammatory medicines. These include ibuprofen or aspirin. You may be given an injection of medicine in the base of the finger or thumb. This typically is a steroid, such as cortisone.  Surgery  If nonsurgical treatments dont ease your symptoms, surgery may be recommended. A tendon is a cordlike fiber that attaches muscle to bone and allows joints to bend. The tendon is surrounded by a protective cover called a sheath. During surgery, the sheath in your finger or thumb is opened to enlarge the space and release the swollen tendon. This allows the finger or thumb to bend and straighten normally. Surgery takes about 20 minutes. It can often be done using a local anesthetic. You may be able to go home the same day. Your hand will be wrapped in a soft bandage. You may need to wear a plaster splint for a short time to keep the finger or thumb still as it heals. The stitches will be removed in about 2 weeks. Your doctor can discuss the risks and benefits of surgery with you.  Date Last Reviewed: 9/21/2015 © 2000-2017 [employer]. [address] [place] [address]. All rights reserved. This information is not intended as a substitute for professional medical care. Always follow your healthcare professional's instructions.

## 2021-04-13 DIAGNOSIS — E11.9 TYPE 2 DIABETES MELLITUS WITHOUT COMPLICATION, WITHOUT LONG-TERM CURRENT USE OF INSULIN: ICD-10-CM

## 2021-04-13 RX ORDER — SITAGLIPTIN AND METFORMIN HYDROCHLORIDE 1000; 100 MG/1; MG/1
1 TABLET, FILM COATED, EXTENDED RELEASE ORAL DAILY
Qty: 90 TABLET | Refills: 1 | Status: SHIPPED | OUTPATIENT
Start: 2021-04-13 | End: 2021-11-12

## 2021-04-20 ENCOUNTER — OFFICE VISIT (OUTPATIENT)
Dept: SURGERY | Facility: CLINIC | Age: 56
End: 2021-04-20
Payer: COMMERCIAL

## 2021-04-20 VITALS
TEMPERATURE: 97 F | SYSTOLIC BLOOD PRESSURE: 113 MMHG | WEIGHT: 163 LBS | BODY MASS INDEX: 24.71 KG/M2 | HEIGHT: 68 IN | HEART RATE: 83 BPM | DIASTOLIC BLOOD PRESSURE: 73 MMHG

## 2021-04-20 DIAGNOSIS — K80.10 CHRONIC CALCULOUS CHOLECYSTITIS: ICD-10-CM

## 2021-04-20 DIAGNOSIS — R00.2 PALPITATIONS: ICD-10-CM

## 2021-04-20 DIAGNOSIS — K80.50 CALCULUS OF BILE DUCT WITHOUT CHOLECYSTITIS AND WITHOUT OBSTRUCTION: Primary | ICD-10-CM

## 2021-04-20 PROCEDURE — 99213 PR OFFICE/OUTPT VISIT, EST, LEVL III, 20-29 MIN: ICD-10-PCS | Mod: S$GLB,,, | Performed by: SURGERY

## 2021-04-20 PROCEDURE — 3008F PR BODY MASS INDEX (BMI) DOCUMENTED: ICD-10-PCS | Mod: S$GLB,,, | Performed by: SURGERY

## 2021-04-20 PROCEDURE — 99213 OFFICE O/P EST LOW 20 MIN: CPT | Mod: S$GLB,,, | Performed by: SURGERY

## 2021-04-20 PROCEDURE — 3008F BODY MASS INDEX DOCD: CPT | Mod: S$GLB,,, | Performed by: SURGERY

## 2021-04-20 PROCEDURE — 1125F AMNT PAIN NOTED PAIN PRSNT: CPT | Mod: S$GLB,,, | Performed by: SURGERY

## 2021-04-20 PROCEDURE — 1125F PR PAIN SEVERITY QUANTIFIED, PAIN PRESENT: ICD-10-PCS | Mod: S$GLB,,, | Performed by: SURGERY

## 2021-04-20 RX ORDER — ESCITALOPRAM OXALATE 10 MG/1
TABLET ORAL
COMMUNITY
Start: 2021-04-13

## 2021-04-21 ENCOUNTER — TELEPHONE (OUTPATIENT)
Dept: SURGERY | Facility: CLINIC | Age: 56
End: 2021-04-21

## 2021-05-06 ENCOUNTER — OFFICE VISIT (OUTPATIENT)
Dept: CARDIOLOGY | Facility: CLINIC | Age: 56
End: 2021-05-06
Payer: COMMERCIAL

## 2021-05-06 VITALS
WEIGHT: 159 LBS | HEART RATE: 78 BPM | DIASTOLIC BLOOD PRESSURE: 80 MMHG | RESPIRATION RATE: 16 BRPM | OXYGEN SATURATION: 97 % | HEIGHT: 68 IN | SYSTOLIC BLOOD PRESSURE: 122 MMHG | BODY MASS INDEX: 24.1 KG/M2

## 2021-05-06 DIAGNOSIS — E11.9 TYPE 2 DIABETES MELLITUS WITHOUT COMPLICATION, WITHOUT LONG-TERM CURRENT USE OF INSULIN: ICD-10-CM

## 2021-05-06 DIAGNOSIS — R00.2 PALPITATIONS: Primary | ICD-10-CM

## 2021-05-06 DIAGNOSIS — R94.31 NONSPECIFIC ABNORMAL ELECTROCARDIOGRAM (ECG) (EKG): ICD-10-CM

## 2021-05-06 DIAGNOSIS — E78.5 DYSLIPIDEMIA: ICD-10-CM

## 2021-05-06 PROCEDURE — 3052F HG A1C>EQUAL 8.0%<EQUAL 9.0%: CPT | Mod: S$GLB,,, | Performed by: INTERNAL MEDICINE

## 2021-05-06 PROCEDURE — 3008F BODY MASS INDEX DOCD: CPT | Mod: S$GLB,,, | Performed by: INTERNAL MEDICINE

## 2021-05-06 PROCEDURE — 1126F PR PAIN SEVERITY QUANTIFIED, NO PAIN PRESENT: ICD-10-PCS | Mod: S$GLB,,, | Performed by: INTERNAL MEDICINE

## 2021-05-06 PROCEDURE — 3052F PR MOST RECENT HEMOGLOBIN A1C LEVEL 8.0 - < 9.0%: ICD-10-PCS | Mod: S$GLB,,, | Performed by: INTERNAL MEDICINE

## 2021-05-06 PROCEDURE — 93005 ELECTROCARDIOGRAM TRACING: CPT | Mod: S$GLB,,, | Performed by: INTERNAL MEDICINE

## 2021-05-06 PROCEDURE — 3008F PR BODY MASS INDEX (BMI) DOCUMENTED: ICD-10-PCS | Mod: S$GLB,,, | Performed by: INTERNAL MEDICINE

## 2021-05-06 PROCEDURE — 99214 PR OFFICE/OUTPT VISIT, EST, LEVL IV, 30-39 MIN: ICD-10-PCS | Mod: S$GLB,,, | Performed by: INTERNAL MEDICINE

## 2021-05-06 PROCEDURE — 99214 OFFICE O/P EST MOD 30 MIN: CPT | Mod: S$GLB,,, | Performed by: INTERNAL MEDICINE

## 2021-05-06 PROCEDURE — 93005 EKG 12-LEAD: ICD-10-PCS | Mod: S$GLB,,, | Performed by: INTERNAL MEDICINE

## 2021-05-06 PROCEDURE — 1126F AMNT PAIN NOTED NONE PRSNT: CPT | Mod: S$GLB,,, | Performed by: INTERNAL MEDICINE

## 2021-05-07 ENCOUNTER — HOSPITAL ENCOUNTER (OUTPATIENT)
Dept: PREADMISSION TESTING | Facility: HOSPITAL | Age: 56
Discharge: HOME | End: 2021-05-07
Attending: SURGERY
Payer: COMMERCIAL

## 2021-05-07 ENCOUNTER — HOSPITAL ENCOUNTER (OUTPATIENT)
Dept: RADIOLOGY | Facility: HOSPITAL | Age: 56
Discharge: HOME | End: 2021-05-07
Attending: SURGERY
Payer: COMMERCIAL

## 2021-05-07 VITALS
BODY MASS INDEX: 24.02 KG/M2 | HEIGHT: 68 IN | TEMPERATURE: 98 F | DIASTOLIC BLOOD PRESSURE: 73 MMHG | SYSTOLIC BLOOD PRESSURE: 113 MMHG | HEART RATE: 64 BPM | WEIGHT: 158.5 LBS | OXYGEN SATURATION: 97 % | RESPIRATION RATE: 16 BRPM

## 2021-05-07 DIAGNOSIS — K80.10 CHRONIC CALCULOUS CHOLECYSTITIS: ICD-10-CM

## 2021-05-07 DIAGNOSIS — K80.50 CALCULUS OF BILE DUCT WITHOUT CHOLECYSTITIS AND WITHOUT OBSTRUCTION: ICD-10-CM

## 2021-05-07 DIAGNOSIS — Z01.818 ENCOUNTER FOR PREADMISSION TESTING: Primary | ICD-10-CM

## 2021-05-07 LAB — SARS-COV-2 RDRP RESP QL NAA+PROBE: NEGATIVE

## 2021-05-07 PROCEDURE — U0002 COVID-19 LAB TEST NON-CDC: HCPCS | Performed by: SURGERY

## 2021-05-07 PROCEDURE — 71046 X-RAY EXAM CHEST 2 VIEWS: CPT | Mod: TC

## 2021-05-10 ENCOUNTER — HOSPITAL ENCOUNTER (OUTPATIENT)
Facility: HOSPITAL | Age: 56
Discharge: HOME | End: 2021-05-10
Attending: SURGERY | Admitting: SURGERY
Payer: COMMERCIAL

## 2021-05-10 ENCOUNTER — ANESTHESIA EVENT (OUTPATIENT)
Dept: SURGERY | Facility: HOSPITAL | Age: 56
End: 2021-05-10
Payer: COMMERCIAL

## 2021-05-10 ENCOUNTER — ANESTHESIA (OUTPATIENT)
Dept: SURGERY | Facility: HOSPITAL | Age: 56
End: 2021-05-10
Payer: COMMERCIAL

## 2021-05-10 VITALS
OXYGEN SATURATION: 96 % | TEMPERATURE: 98 F | DIASTOLIC BLOOD PRESSURE: 70 MMHG | HEART RATE: 73 BPM | SYSTOLIC BLOOD PRESSURE: 105 MMHG | WEIGHT: 158.5 LBS | BODY MASS INDEX: 24.02 KG/M2 | HEIGHT: 68 IN | RESPIRATION RATE: 16 BRPM

## 2021-05-10 DIAGNOSIS — K80.50 CALCULUS OF BILE DUCT WITHOUT CHOLECYSTITIS AND WITHOUT OBSTRUCTION: Primary | ICD-10-CM

## 2021-05-10 LAB — GLUCOSE SERPL-MCNC: 140 MG/DL (ref 70–110)

## 2021-05-10 PROCEDURE — 37000008 HC ANESTHESIA 1ST 15 MINUTES: Performed by: SURGERY

## 2021-05-10 PROCEDURE — 25000003 PHARM REV CODE 250: Performed by: SURGERY

## 2021-05-10 PROCEDURE — 63600175 PHARM REV CODE 636 W HCPCS: Performed by: SURGERY

## 2021-05-10 PROCEDURE — 27000671 HC TUBING MICROBORE EXT: Performed by: ANESTHESIOLOGY

## 2021-05-10 PROCEDURE — 27201423 OPTIME MED/SURG SUP & DEVICES STERILE SUPPLY: Performed by: SURGERY

## 2021-05-10 PROCEDURE — 36000708 HC OR TIME LEV III 1ST 15 MIN: Performed by: SURGERY

## 2021-05-10 PROCEDURE — 36000709 HC OR TIME LEV III EA ADD 15 MIN: Performed by: SURGERY

## 2021-05-10 PROCEDURE — 82962 GLUCOSE BLOOD TEST: CPT | Performed by: SURGERY

## 2021-05-10 PROCEDURE — 37000009 HC ANESTHESIA EA ADD 15 MINS: Performed by: SURGERY

## 2021-05-10 PROCEDURE — 71000033 HC RECOVERY, INTIAL HOUR: Performed by: SURGERY

## 2021-05-10 PROCEDURE — 27000284 HC CANNULA NASAL: Performed by: ANESTHESIOLOGY

## 2021-05-10 PROCEDURE — C9290 INJ, BUPIVACAINE LIPOSOME: HCPCS | Performed by: SURGERY

## 2021-05-10 PROCEDURE — 63600175 PHARM REV CODE 636 W HCPCS: Performed by: NURSE ANESTHETIST, CERTIFIED REGISTERED

## 2021-05-10 PROCEDURE — 27000673 HC TUBING BLOOD Y: Performed by: ANESTHESIOLOGY

## 2021-05-10 PROCEDURE — 27201107 HC STYLET, STANDARD: Performed by: ANESTHESIOLOGY

## 2021-05-10 PROCEDURE — 71000015 HC POSTOP RECOV 1ST HR: Performed by: SURGERY

## 2021-05-10 PROCEDURE — 27202107 HC XP QUATRO SENSOR: Performed by: ANESTHESIOLOGY

## 2021-05-10 PROCEDURE — 47562 LAPAROSCOPIC CHOLECYSTECTOMY: CPT | Mod: ,,, | Performed by: SURGERY

## 2021-05-10 PROCEDURE — 27000080 OPTIME MED/SURG SUP & DEVICES GENERAL CLASSIFICATION: Performed by: SURGERY

## 2021-05-10 PROCEDURE — 25000003 PHARM REV CODE 250: Performed by: NURSE ANESTHETIST, CERTIFIED REGISTERED

## 2021-05-10 PROCEDURE — 47562 PR LAP,CHOLECYSTECTOMY: ICD-10-PCS | Mod: ,,, | Performed by: SURGERY

## 2021-05-10 RX ORDER — DEXAMETHASONE SODIUM PHOSPHATE 4 MG/ML
INJECTION, SOLUTION INTRA-ARTICULAR; INTRALESIONAL; INTRAMUSCULAR; INTRAVENOUS; SOFT TISSUE
Status: DISCONTINUED | OUTPATIENT
Start: 2021-05-10 | End: 2021-05-10

## 2021-05-10 RX ORDER — PROPOFOL 10 MG/ML
VIAL (ML) INTRAVENOUS
Status: DISCONTINUED | OUTPATIENT
Start: 2021-05-10 | End: 2021-05-10

## 2021-05-10 RX ORDER — HYDROCODONE BITARTRATE AND ACETAMINOPHEN 5; 325 MG/1; MG/1
1 TABLET ORAL EVERY 6 HOURS PRN
Qty: 30 TABLET | Refills: 0 | Status: SHIPPED | OUTPATIENT
Start: 2021-05-10 | End: 2022-01-05

## 2021-05-10 RX ORDER — SUCCINYLCHOLINE CHLORIDE 20 MG/ML
INJECTION INTRAMUSCULAR; INTRAVENOUS
Status: DISCONTINUED | OUTPATIENT
Start: 2021-05-10 | End: 2021-05-10

## 2021-05-10 RX ORDER — ROCURONIUM BROMIDE 10 MG/ML
INJECTION, SOLUTION INTRAVENOUS
Status: DISCONTINUED | OUTPATIENT
Start: 2021-05-10 | End: 2021-05-10

## 2021-05-10 RX ORDER — MEPERIDINE HYDROCHLORIDE 50 MG/ML
12.5 INJECTION INTRAMUSCULAR; INTRAVENOUS; SUBCUTANEOUS EVERY 10 MIN PRN
Status: DISCONTINUED | OUTPATIENT
Start: 2021-05-10 | End: 2021-05-10 | Stop reason: HOSPADM

## 2021-05-10 RX ORDER — MIDAZOLAM HYDROCHLORIDE 5 MG/ML
INJECTION INTRAMUSCULAR; INTRAVENOUS
Status: DISCONTINUED | OUTPATIENT
Start: 2021-05-10 | End: 2021-05-10

## 2021-05-10 RX ORDER — SODIUM CHLORIDE 0.9 % (FLUSH) 0.9 %
10 SYRINGE (ML) INJECTION
Status: DISCONTINUED | OUTPATIENT
Start: 2021-05-10 | End: 2021-05-10 | Stop reason: HOSPADM

## 2021-05-10 RX ORDER — ACETAMINOPHEN 10 MG/ML
INJECTION, SOLUTION INTRAVENOUS
Status: DISCONTINUED | OUTPATIENT
Start: 2021-05-10 | End: 2021-05-10

## 2021-05-10 RX ORDER — LIDOCAINE HCL/PF 100 MG/5ML
SYRINGE (ML) INTRAVENOUS
Status: DISCONTINUED | OUTPATIENT
Start: 2021-05-10 | End: 2021-05-10

## 2021-05-10 RX ORDER — FAMOTIDINE 10 MG/ML
INJECTION INTRAVENOUS
Status: DISCONTINUED | OUTPATIENT
Start: 2021-05-10 | End: 2021-05-10

## 2021-05-10 RX ORDER — BUPIVACAINE HYDROCHLORIDE AND EPINEPHRINE 5; 5 MG/ML; UG/ML
INJECTION, SOLUTION EPIDURAL; INTRACAUDAL; PERINEURAL
Status: DISCONTINUED | OUTPATIENT
Start: 2021-05-10 | End: 2021-05-10 | Stop reason: HOSPADM

## 2021-05-10 RX ORDER — HYDROMORPHONE HYDROCHLORIDE 1 MG/ML
0.2 INJECTION, SOLUTION INTRAMUSCULAR; INTRAVENOUS; SUBCUTANEOUS
Status: DISCONTINUED | OUTPATIENT
Start: 2021-05-10 | End: 2021-05-10 | Stop reason: HOSPADM

## 2021-05-10 RX ORDER — ONDANSETRON 2 MG/ML
4 INJECTION INTRAMUSCULAR; INTRAVENOUS DAILY PRN
Status: DISCONTINUED | OUTPATIENT
Start: 2021-05-10 | End: 2021-05-10 | Stop reason: HOSPADM

## 2021-05-10 RX ORDER — FENTANYL CITRATE 50 UG/ML
INJECTION, SOLUTION INTRAMUSCULAR; INTRAVENOUS
Status: DISCONTINUED | OUTPATIENT
Start: 2021-05-10 | End: 2021-05-10

## 2021-05-10 RX ORDER — ONDANSETRON 2 MG/ML
INJECTION INTRAMUSCULAR; INTRAVENOUS
Status: DISCONTINUED | OUTPATIENT
Start: 2021-05-10 | End: 2021-05-10

## 2021-05-10 RX ORDER — OXYCODONE HYDROCHLORIDE 5 MG/1
5 TABLET ORAL
Status: DISCONTINUED | OUTPATIENT
Start: 2021-05-10 | End: 2021-05-10 | Stop reason: HOSPADM

## 2021-05-10 RX ORDER — METOPROLOL TARTRATE 1 MG/ML
INJECTION, SOLUTION INTRAVENOUS
Status: DISCONTINUED | OUTPATIENT
Start: 2021-05-10 | End: 2021-05-10

## 2021-05-10 RX ORDER — DIPHENHYDRAMINE HYDROCHLORIDE 50 MG/ML
12.5 INJECTION INTRAMUSCULAR; INTRAVENOUS
Status: DISCONTINUED | OUTPATIENT
Start: 2021-05-10 | End: 2021-05-10 | Stop reason: HOSPADM

## 2021-05-10 RX ADMIN — FAMOTIDINE 20 MG: 10 INJECTION, SOLUTION INTRAVENOUS at 09:05

## 2021-05-10 RX ADMIN — DEXAMETHASONE SODIUM PHOSPHATE 8 MG: 4 INJECTION, SOLUTION INTRAMUSCULAR; INTRAVENOUS at 09:05

## 2021-05-10 RX ADMIN — METOPROLOL TARTRATE 2.5 MG: 1 INJECTION, SOLUTION INTRAVENOUS at 09:05

## 2021-05-10 RX ADMIN — LIDOCAINE HYDROCHLORIDE 80 MG: 20 INJECTION, SOLUTION INTRAVENOUS at 09:05

## 2021-05-10 RX ADMIN — FENTANYL CITRATE 100 MCG: 50 INJECTION INTRAMUSCULAR; INTRAVENOUS at 09:05

## 2021-05-10 RX ADMIN — ROCURONIUM BROMIDE 5 MG: 10 INJECTION, SOLUTION INTRAVENOUS at 09:05

## 2021-05-10 RX ADMIN — ONDANSETRON 4 MG: 2 INJECTION INTRAMUSCULAR; INTRAVENOUS at 09:05

## 2021-05-10 RX ADMIN — SUCCINYLCHOLINE CHLORIDE 120 MG: 20 INJECTION, SOLUTION INTRAMUSCULAR; INTRAVENOUS at 09:05

## 2021-05-10 RX ADMIN — ACETAMINOPHEN 1000 MG: 10 INJECTION, SOLUTION INTRAVENOUS at 09:05

## 2021-05-10 RX ADMIN — PROPOFOL 180 MG: 10 INJECTION, EMULSION INTRAVENOUS at 09:05

## 2021-05-10 RX ADMIN — SODIUM CHLORIDE, SODIUM LACTATE, POTASSIUM CHLORIDE, AND CALCIUM CHLORIDE: .6; .31; .03; .02 INJECTION, SOLUTION INTRAVENOUS at 09:05

## 2021-05-10 RX ADMIN — MIDAZOLAM HYDROCHLORIDE 2 MG: 5 INJECTION, SOLUTION INTRAMUSCULAR; INTRAVENOUS at 09:05

## 2021-05-12 ENCOUNTER — TELEPHONE (OUTPATIENT)
Dept: SURGERY | Facility: CLINIC | Age: 56
End: 2021-05-12

## 2021-05-25 ENCOUNTER — OFFICE VISIT (OUTPATIENT)
Dept: SURGERY | Facility: CLINIC | Age: 56
End: 2021-05-25
Payer: COMMERCIAL

## 2021-05-25 VITALS
TEMPERATURE: 98 F | SYSTOLIC BLOOD PRESSURE: 110 MMHG | HEIGHT: 68 IN | BODY MASS INDEX: 23.95 KG/M2 | WEIGHT: 158 LBS | HEART RATE: 78 BPM | DIASTOLIC BLOOD PRESSURE: 72 MMHG

## 2021-05-25 DIAGNOSIS — K80.10 CHRONIC CALCULOUS CHOLECYSTITIS: Primary | ICD-10-CM

## 2021-05-25 PROCEDURE — 99024 PR POST-OP FOLLOW-UP VISIT: ICD-10-PCS | Mod: S$GLB,,, | Performed by: SURGERY

## 2021-05-25 PROCEDURE — 1125F AMNT PAIN NOTED PAIN PRSNT: CPT | Mod: S$GLB,,, | Performed by: SURGERY

## 2021-05-25 PROCEDURE — 3008F PR BODY MASS INDEX (BMI) DOCUMENTED: ICD-10-PCS | Mod: S$GLB,,, | Performed by: SURGERY

## 2021-05-25 PROCEDURE — 99024 POSTOP FOLLOW-UP VISIT: CPT | Mod: S$GLB,,, | Performed by: SURGERY

## 2021-05-25 PROCEDURE — 1125F PR PAIN SEVERITY QUANTIFIED, PAIN PRESENT: ICD-10-PCS | Mod: S$GLB,,, | Performed by: SURGERY

## 2021-05-25 PROCEDURE — 3008F BODY MASS INDEX DOCD: CPT | Mod: S$GLB,,, | Performed by: SURGERY

## 2021-06-23 ENCOUNTER — HOSPITAL ENCOUNTER (OUTPATIENT)
Dept: RADIOLOGY | Facility: CLINIC | Age: 56
Discharge: HOME | End: 2021-06-23
Attending: INTERNAL MEDICINE
Payer: COMMERCIAL

## 2021-06-23 ENCOUNTER — HOSPITAL ENCOUNTER (OUTPATIENT)
Dept: CARDIOLOGY | Facility: CLINIC | Age: 56
Discharge: HOME | End: 2021-06-23
Attending: INTERNAL MEDICINE
Payer: COMMERCIAL

## 2021-06-23 VITALS — HEIGHT: 68 IN | WEIGHT: 158 LBS | BODY MASS INDEX: 23.95 KG/M2

## 2021-06-23 DIAGNOSIS — R94.31 NONSPECIFIC ABNORMAL ELECTROCARDIOGRAM (ECG) (EKG): ICD-10-CM

## 2021-06-23 DIAGNOSIS — R00.2 PALPITATIONS: ICD-10-CM

## 2021-06-23 DIAGNOSIS — E11.9 TYPE 2 DIABETES MELLITUS WITHOUT COMPLICATION, WITHOUT LONG-TERM CURRENT USE OF INSULIN: ICD-10-CM

## 2021-06-23 PROCEDURE — 78452 HT MUSCLE IMAGE SPECT MULT: CPT | Mod: S$GLB,,, | Performed by: INTERNAL MEDICINE

## 2021-06-23 PROCEDURE — A9502 TC99M TETROFOSMIN: HCPCS | Mod: S$GLB,,, | Performed by: INTERNAL MEDICINE

## 2021-06-23 PROCEDURE — 93306 ECHO (CUPID ONLY): ICD-10-PCS | Mod: S$GLB,,, | Performed by: INTERNAL MEDICINE

## 2021-06-23 PROCEDURE — 93015 CV STRESS TEST SUPVJ I&R: CPT | Mod: S$GLB,,, | Performed by: INTERNAL MEDICINE

## 2021-06-23 PROCEDURE — 93015 STRESS TEST WITH MYOCARDIAL PERFUSION (CUPID ONLY): ICD-10-PCS | Mod: S$GLB,,, | Performed by: INTERNAL MEDICINE

## 2021-06-23 PROCEDURE — A9502 STRESS TEST WITH MYOCARDIAL PERFUSION (CUPID ONLY): ICD-10-PCS | Mod: S$GLB,,, | Performed by: INTERNAL MEDICINE

## 2021-06-23 PROCEDURE — 78452 STRESS TEST WITH MYOCARDIAL PERFUSION (CUPID ONLY): ICD-10-PCS | Mod: S$GLB,,, | Performed by: INTERNAL MEDICINE

## 2021-06-23 PROCEDURE — 93224 HOLTER MONITOR - 24 HOUR (CUPID ONLY): ICD-10-PCS | Mod: S$GLB,,, | Performed by: INTERNAL MEDICINE

## 2021-06-23 PROCEDURE — 93224 XTRNL ECG REC UP TO 48 HRS: CPT | Mod: S$GLB,,, | Performed by: INTERNAL MEDICINE

## 2021-06-23 PROCEDURE — 93306 TTE W/DOPPLER COMPLETE: CPT | Mod: S$GLB,,, | Performed by: INTERNAL MEDICINE

## 2021-06-30 LAB
CV STRESS BASE HR: 70 BPM
DIASTOLIC BLOOD PRESSURE: 74 MMHG
EJECTION FRACTION- HIGH: 65 %
END DIASTOLIC INDEX-HIGH: 158 ML/M2
END DIASTOLIC INDEX-LOW: 94 ML/M2
END SYSTOLIC INDEX-HIGH: 71 ML/M2
END SYSTOLIC INDEX-LOW: 33 ML/M2
NUC STRESS DIASTOLIC VOLUME INDEX: 46
NUC STRESS EJECTION FRACTION: 67 %
NUC STRESS SYSTOLIC VOLUME INDEX: 15
OHS CV CPX 1 MINUTE RECOVERY HEART RATE: 124 BPM
OHS CV CPX 85 PERCENT MAX PREDICTED HEART RATE MALE: 139
OHS CV CPX ESTIMATED METS: 10
OHS CV CPX MAX PREDICTED HEART RATE: 163
OHS CV CPX PATIENT IS FEMALE: 0
OHS CV CPX PATIENT IS MALE: 1
OHS CV CPX PEAK DIASTOLIC BLOOD PRESSURE: 84 MMHG
OHS CV CPX PEAK HEAR RATE: 148 BPM
OHS CV CPX PEAK RATE PRESSURE PRODUCT: NORMAL
OHS CV CPX PEAK SYSTOLIC BLOOD PRESSURE: 140 MMHG
OHS CV CPX PERCENT MAX PREDICTED HEART RATE ACHIEVED: 91
OHS CV CPX RATE PRESSURE PRODUCT PRESENTING: 8120
RETIRED EF AND QEF - SEE NOTES: 53 %
STRESS ECHO POST EXERCISE DUR MIN: 7 MINUTES
STRESS ECHO POST EXERCISE DUR SEC: 50 SECONDS
SYSTOLIC BLOOD PRESSURE: 116 MMHG

## 2021-07-05 LAB
OHS CV EVENT MONITOR DAY: 0
OHS CV HOLTER LENGTH DECIMAL HOURS: 24
OHS CV HOLTER LENGTH HOURS: 24
OHS CV HOLTER LENGTH MINUTES: 0

## 2021-07-06 ENCOUNTER — TELEPHONE (OUTPATIENT)
Dept: CARDIOLOGY | Facility: CLINIC | Age: 56
End: 2021-07-06

## 2021-07-19 ENCOUNTER — PATIENT MESSAGE (OUTPATIENT)
Dept: CARDIOLOGY | Facility: CLINIC | Age: 56
End: 2021-07-19

## 2021-07-26 ENCOUNTER — TELEPHONE (OUTPATIENT)
Dept: FAMILY MEDICINE | Facility: CLINIC | Age: 56
End: 2021-07-26

## 2021-07-27 ENCOUNTER — TELEPHONE (OUTPATIENT)
Dept: FAMILY MEDICINE | Facility: CLINIC | Age: 56
End: 2021-07-27

## 2021-07-27 ENCOUNTER — PATIENT MESSAGE (OUTPATIENT)
Dept: FAMILY MEDICINE | Facility: CLINIC | Age: 56
End: 2021-07-27

## 2021-08-09 ENCOUNTER — TELEPHONE (OUTPATIENT)
Dept: FAMILY MEDICINE | Facility: CLINIC | Age: 56
End: 2021-08-09

## 2021-08-09 ENCOUNTER — PATIENT MESSAGE (OUTPATIENT)
Dept: FAMILY MEDICINE | Facility: CLINIC | Age: 56
End: 2021-08-09

## 2021-08-09 DIAGNOSIS — R91.1 PULMONARY NODULE: Primary | ICD-10-CM

## 2021-08-11 DIAGNOSIS — R91.1 PULMONARY NODULE: Primary | ICD-10-CM

## 2021-08-17 ENCOUNTER — HOSPITAL ENCOUNTER (OUTPATIENT)
Dept: RADIOLOGY | Facility: HOSPITAL | Age: 56
Discharge: HOME | End: 2021-08-17
Attending: INTERNAL MEDICINE
Payer: COMMERCIAL

## 2021-08-17 ENCOUNTER — PATIENT MESSAGE (OUTPATIENT)
Dept: FAMILY MEDICINE | Facility: CLINIC | Age: 56
End: 2021-08-17

## 2021-08-17 DIAGNOSIS — R91.1 PULMONARY NODULE: ICD-10-CM

## 2021-08-17 PROCEDURE — 71046 X-RAY EXAM CHEST 2 VIEWS: CPT | Mod: TC,PO

## 2021-09-15 LAB
AORTIC ROOT ANNULUS: 3.3 CM
AORTIC VALVE CUSP SEPERATION: 2 CM
AV INDEX (PROSTH): 0.95
AV MEAN GRADIENT: 2 MMHG
AV PEAK GRADIENT: 4 MMHG
AV VALVE AREA: 3 CM2
AV VELOCITY RATIO: 0.78
BSA FOR ECHO PROCEDURE: 1.85 M2
CV ECHO LV RWT: 0.48 CM
DOP CALC AO PEAK VEL: 0.95 M/S
DOP CALC AO VTI: 19.5 CM
DOP CALC LVOT AREA: 3.1 CM2
DOP CALC LVOT DIAMETER: 2 CM
DOP CALC LVOT PEAK VEL: 0.74 M/S
DOP CALC LVOT STROKE VOLUME: 58.4 CM3
DOP CALCLVOT PEAK VEL VTI: 18.6 CM
E WAVE DECELERATION TIME: 201 MS
E/A RATIO: 0.84
E/E' RATIO: 7.75 M/S
ECHO LV POSTERIOR WALL: 1.06 CM (ref 0.6–1.1)
EJECTION FRACTION: 65 %
FRACTIONAL SHORTENING: 29 % (ref 28–44)
INTERVENTRICULAR SEPTUM: 1 CM (ref 0.6–1.1)
IVRT: 90 MS
LA MAJOR: 4 CM
LEFT ATRIUM SIZE: 3.4 CM
LEFT INTERNAL DIMENSION IN SYSTOLE: 3.13 CM (ref 2.1–4)
LEFT VENTRICLE MASS INDEX: 84 G/M2
LEFT VENTRICULAR INTERNAL DIMENSION IN DIASTOLE: 4.41 CM (ref 3.5–6)
LEFT VENTRICULAR MASS: 154.58 G
LV LATERAL E/E' RATIO: 6.2 M/S
LV SEPTAL E/E' RATIO: 10.33 M/S
MV PEAK A VEL: 0.74 M/S
MV PEAK E VEL: 0.62 M/S
PISA TR MAX VEL: 2.33 M/S
RA PRESSURE: 3 MMHG
RIGHT VENTRICULAR END-DIASTOLIC DIMENSION: 2.04 CM
TDI LATERAL: 0.1 M/S
TDI SEPTAL: 0.06 M/S
TDI: 0.08 M/S
TR MAX PG: 22 MMHG
TV REST PULMONARY ARTERY PRESSURE: 25 MMHG

## 2021-12-03 ENCOUNTER — TELEPHONE (OUTPATIENT)
Dept: FAMILY MEDICINE | Facility: CLINIC | Age: 56
End: 2021-12-03
Payer: COMMERCIAL

## 2021-12-03 DIAGNOSIS — Z12.5 SCREENING FOR PROSTATE CANCER: ICD-10-CM

## 2021-12-03 DIAGNOSIS — N20.0 NEPHROLITHIASIS: ICD-10-CM

## 2021-12-03 DIAGNOSIS — E11.9 TYPE 2 DIABETES MELLITUS WITHOUT COMPLICATION, WITHOUT LONG-TERM CURRENT USE OF INSULIN: Primary | ICD-10-CM

## 2021-12-03 DIAGNOSIS — Z11.4 SCREENING FOR HIV (HUMAN IMMUNODEFICIENCY VIRUS): ICD-10-CM

## 2021-12-03 DIAGNOSIS — E78.5 DYSLIPIDEMIA: ICD-10-CM

## 2021-12-07 ENCOUNTER — PATIENT MESSAGE (OUTPATIENT)
Dept: FAMILY MEDICINE | Facility: CLINIC | Age: 56
End: 2021-12-07
Payer: COMMERCIAL

## 2022-01-05 ENCOUNTER — OFFICE VISIT (OUTPATIENT)
Dept: FAMILY MEDICINE | Facility: CLINIC | Age: 57
End: 2022-01-05
Payer: COMMERCIAL

## 2022-01-05 VITALS
HEIGHT: 68 IN | SYSTOLIC BLOOD PRESSURE: 102 MMHG | HEART RATE: 83 BPM | WEIGHT: 161 LBS | BODY MASS INDEX: 24.4 KG/M2 | DIASTOLIC BLOOD PRESSURE: 62 MMHG

## 2022-01-05 DIAGNOSIS — J30.1 SEASONAL ALLERGIC RHINITIS DUE TO POLLEN: Chronic | ICD-10-CM

## 2022-01-05 DIAGNOSIS — E11.9 TYPE 2 DIABETES MELLITUS WITHOUT COMPLICATION, WITHOUT LONG-TERM CURRENT USE OF INSULIN: Primary | Chronic | ICD-10-CM

## 2022-01-05 DIAGNOSIS — M65.332 TRIGGER MIDDLE FINGER OF LEFT HAND: ICD-10-CM

## 2022-01-05 DIAGNOSIS — E78.5 DYSLIPIDEMIA: Chronic | ICD-10-CM

## 2022-01-05 DIAGNOSIS — R91.1 PULMONARY NODULE: Chronic | ICD-10-CM

## 2022-01-05 DIAGNOSIS — N20.0 NEPHROLITHIASIS: Chronic | ICD-10-CM

## 2022-01-05 LAB
ALBUMIN SERPL-MCNC: 4.7 G/DL (ref 3.8–4.9)
ALBUMIN/CREAT UR: 8 MG/G CREAT (ref 0–29)
ALBUMIN/GLOB SERPL: 2.2 {RATIO} (ref 1.2–2.2)
ALP SERPL-CCNC: 74 IU/L (ref 44–121)
ALT SERPL-CCNC: 23 IU/L (ref 0–44)
AST SERPL-CCNC: 13 IU/L (ref 0–40)
BILIRUB SERPL-MCNC: 0.4 MG/DL (ref 0–1.2)
BUN SERPL-MCNC: 14 MG/DL (ref 6–24)
BUN/CREAT SERPL: 18 (ref 9–20)
CALCIUM SERPL-MCNC: 9.5 MG/DL (ref 8.7–10.2)
CHLORIDE SERPL-SCNC: 104 MMOL/L (ref 96–106)
CHOLEST SERPL-MCNC: 225 MG/DL (ref 100–199)
CO2 SERPL-SCNC: 20 MMOL/L (ref 20–29)
CREAT SERPL-MCNC: 0.77 MG/DL (ref 0.76–1.27)
CREAT UR-MCNC: 41.7 MG/DL
GLOBULIN SER CALC-MCNC: 2.1 G/DL (ref 1.5–4.5)
GLUCOSE SERPL-MCNC: 214 MG/DL (ref 65–99)
HBA1C MFR BLD: 8.3 % (ref 4.8–5.6)
HDLC SERPL-MCNC: 41 MG/DL
HIV 1+2 AB+HIV1 P24 AG SERPL QL IA: NON REACTIVE
LDLC SERPL CALC-MCNC: 159 MG/DL (ref 0–99)
MICROALBUMIN UR-MCNC: 3.5 UG/ML
POTASSIUM SERPL-SCNC: 4.6 MMOL/L (ref 3.5–5.2)
PROT SERPL-MCNC: 6.8 G/DL (ref 6–8.5)
PSA SERPL-MCNC: 0.8 NG/ML (ref 0–4)
SODIUM SERPL-SCNC: 139 MMOL/L (ref 134–144)
TRIGL SERPL-MCNC: 135 MG/DL (ref 0–149)
VLDLC SERPL CALC-MCNC: 25 MG/DL (ref 5–40)

## 2022-01-05 PROCEDURE — 4010F PR ACE/ARB THEARPY RXD/TAKEN: ICD-10-PCS | Mod: S$GLB,,, | Performed by: INTERNAL MEDICINE

## 2022-01-05 PROCEDURE — 3074F PR MOST RECENT SYSTOLIC BLOOD PRESSURE < 130 MM HG: ICD-10-PCS | Mod: S$GLB,,, | Performed by: INTERNAL MEDICINE

## 2022-01-05 PROCEDURE — 3074F SYST BP LT 130 MM HG: CPT | Mod: S$GLB,,, | Performed by: INTERNAL MEDICINE

## 2022-01-05 PROCEDURE — 4010F ACE/ARB THERAPY RXD/TAKEN: CPT | Mod: S$GLB,,, | Performed by: INTERNAL MEDICINE

## 2022-01-05 PROCEDURE — 3078F PR MOST RECENT DIASTOLIC BLOOD PRESSURE < 80 MM HG: ICD-10-PCS | Mod: S$GLB,,, | Performed by: INTERNAL MEDICINE

## 2022-01-05 PROCEDURE — 3061F NEG MICROALBUMINURIA REV: CPT | Mod: S$GLB,,, | Performed by: INTERNAL MEDICINE

## 2022-01-05 PROCEDURE — 3052F PR MOST RECENT HEMOGLOBIN A1C LEVEL 8.0 - < 9.0%: ICD-10-PCS | Mod: S$GLB,,, | Performed by: INTERNAL MEDICINE

## 2022-01-05 PROCEDURE — 3008F BODY MASS INDEX DOCD: CPT | Mod: S$GLB,,, | Performed by: INTERNAL MEDICINE

## 2022-01-05 PROCEDURE — 3078F DIAST BP <80 MM HG: CPT | Mod: S$GLB,,, | Performed by: INTERNAL MEDICINE

## 2022-01-05 PROCEDURE — 1160F RVW MEDS BY RX/DR IN RCRD: CPT | Mod: S$GLB,,, | Performed by: INTERNAL MEDICINE

## 2022-01-05 PROCEDURE — 3061F PR NEG MICROALBUMINURIA RESULT DOCUMENTED/REVIEW: ICD-10-PCS | Mod: S$GLB,,, | Performed by: INTERNAL MEDICINE

## 2022-01-05 PROCEDURE — 3066F PR DOCUMENTATION OF TREATMENT FOR NEPHROPATHY: ICD-10-PCS | Mod: S$GLB,,, | Performed by: INTERNAL MEDICINE

## 2022-01-05 PROCEDURE — 99214 OFFICE O/P EST MOD 30 MIN: CPT | Mod: S$GLB,,, | Performed by: INTERNAL MEDICINE

## 2022-01-05 PROCEDURE — 3066F NEPHROPATHY DOC TX: CPT | Mod: S$GLB,,, | Performed by: INTERNAL MEDICINE

## 2022-01-05 PROCEDURE — 1160F PR REVIEW ALL MEDS BY PRESCRIBER/CLIN PHARMACIST DOCUMENTED: ICD-10-PCS | Mod: S$GLB,,, | Performed by: INTERNAL MEDICINE

## 2022-01-05 PROCEDURE — 3008F PR BODY MASS INDEX (BMI) DOCUMENTED: ICD-10-PCS | Mod: S$GLB,,, | Performed by: INTERNAL MEDICINE

## 2022-01-05 PROCEDURE — 3052F HG A1C>EQUAL 8.0%<EQUAL 9.0%: CPT | Mod: S$GLB,,, | Performed by: INTERNAL MEDICINE

## 2022-01-05 PROCEDURE — 99214 PR OFFICE/OUTPT VISIT, EST, LEVL IV, 30-39 MIN: ICD-10-PCS | Mod: S$GLB,,, | Performed by: INTERNAL MEDICINE

## 2022-01-05 RX ORDER — RAMIPRIL 5 MG/1
5 CAPSULE ORAL DAILY
COMMUNITY
End: 2022-01-05

## 2022-01-05 RX ORDER — ROSUVASTATIN CALCIUM 5 MG/1
5 TABLET, COATED ORAL NIGHTLY
Qty: 90 TABLET | Refills: 3 | Status: SHIPPED | OUTPATIENT
Start: 2022-01-05 | End: 2022-06-30

## 2022-01-05 RX ORDER — RAMIPRIL 1.25 MG/1
1.25 CAPSULE ORAL DAILY
COMMUNITY
End: 2022-06-09

## 2022-01-05 NOTE — PATIENT INSTRUCTIONS
Patient Education       Heart Healthy Diet   General   With a heart healthy food plan, you will learn to make better food choices. This diet may help you lower your blood cholesterol level, manage your blood pressure, and lower your risk for heart problems. Smaller portions may also be helpful.  Sodium is a type of mineral found in many foods. It helps keep the balance of fluids in your body. Too much sodium can raise your blood pressure. It can also make you take on extra water. This is called edema. Pay careful attention to how much salt or sodium is in your food. You may need to avoid salt or eat foods with less sodium.  Cholesterol is a fat-like, waxy substance in your blood. It is normal to have some cholesterol in your blood because your body makes it. You also get extra cholesterol from all animal products. These are foods like meats, eggs, and dairy products. Too much cholesterol in your blood can block or damage your blood vessels. This can lead to a heart attack or stroke.  Fats in your food have calories which give energy. Not all fats are bad. Some fats are healthy, like the fat found in fish, nuts, and olive oil. These are called unsaturated fats. They help manage body functions and lower cholesterol levels. Learn about the best fats to use in your diet and where to use them. Eating too much fat may make you more likely to weigh more than is healthy. This raises your risk of many heart problems.  Fiber is found in plants. Meat and dairy products do not have fiber in them. Fiber can help you lower your unhealthy cholesterol level. You may need more water as you eat more fiber so you do not get hard stools.  What lifestyle changes are needed?   Eat a healthy diet and workout often. Try to use as many calories as you take in each day.  What changes to diet are needed?   · Eat oily fish at least 2 times a week. These are fish like tuna, salmon, and mackerel.  · Limit sodium to no more than 2,300 mg of  sodium per day. This is about 1 teaspoon (5 grams) of table salt. Use little or no salt when making food. Try other spices or seasoning instead.  · Limit how much cholesterol you eat to less than 300 mg per day. You can do this by having lean meats. Also eat lots of fruits, vegetables, and fat-free and low-fat dairy products.  · Limit how much trans fats you eat. Trans fats are found in many processed foods like stick margarine, shortening, and some fried foods. Also, lower how much hydrogenated fats you eat. They are used to make pastries, biscuits, cookies, crackers, chips, and many snack foods.  · Have no more than 1 drink per day of beer, wine, and mixed drinks (alcohol).  Who should use this diet?   A heart healthy diet is good for everyone.  What foods are good to eat?   · Grains: Try to eat 6 to 8 servings of whole grain, high fiber foods each day. These are whole grain bread, cereals, brown rice, or pasta.  · Fruits and vegetables: Eat 4 to 5 servings each day. Try to pick many kinds and colors. Try to eat more that are fresh or frozen. Look for low sodium or salt-free if you choose canned. Rinse canned items before cooking or eating. Dried peas, beans, and lentils are also good.  · Dairy: Choose low fat (1%) or fat-free milk. Eat nonfat or low-fat products.  · Protein: Try to eat more low fat or lean meats like chicken and turkey. Eat less red meat and eat more fish, eggs, egg whites, and beans instead.  · Fats: Use good fats found in fish, nuts, and avocados. Try using olive oil, canola oil, and low-sodium and low-fat salad dressing and mayonnaise. Use corn, safflower, sunflower, and soybean oils.  · Condiments: Use low-sodium or salt-free broths, soups, soy sauce, and condiments. Pepper, herbs, spices, vinegar, lemon or lime juices are great for seasoning. Sugar, cocoa powder, honey, syrup, and jams may be eaten in small amounts.  · Sweets: Low-fat, trans fat-free cookies, cakes, and pies; oneil  crackers; animal crackers; low-fat fig bars; and sherri snaps.     What foods should be limited or avoided?   · Grains: Salted breads, rolls, crackers, quick breads, self-rising flours, biscuit mixes, regular bread crumbs, instant hot cereals, commercially-prepared rice, pasta, stuffing mixes  · Fruits and vegetables: Commercially-prepared potatoes and vegetable mixes, regular canned vegetables and juices, vegetables frozen with sauce or pickled vegetables, processed fruits with added sugar or salt  · Dairy: Whole milk, malted milk, chocolate milk, buttermilk  · Protein: Smoked, cured, salted, or canned meat, fish, or poultry such as mayen and sausages  · Fats: Cut back on solid fats like butter, lard, and margarine.  · Condiments and snacks: Salted and canned peas, beans, and olives; salted snack foods; fried foods; soda, juices, or other sweetened drinks; commercially-softened water. Miso, salsa, ketchup, barbecue sauce, Worcestershire sauce, soy sauce, and teriyaki sauce are also high in salt.  · Sweets: High-fat baked goods such as muffins, donuts, pastries, commercial baked goods  Helpful tips   · When you go to a grocery store, have a list or a meal plan. Do not shop when you are hungry to avoid cravings for foods.  · You need to know about the sodium and fat content of the food you eat. Read food labels with care. They will show you how much of each is in a serving. This amount is given as a percentage of the total amount you need each day. Reading the labels will help you make healthy food choices.     · Avoid fast foods.  · Watch your portions when eating out. Split an order or bring home half for another meal.     · Talk to a dietitian for help.  Where can I learn more?   American Academy of Family Physicians  https://familydoctor.org/diet-and-exercise-for-a-healthy-heart/   American Heart  Association  https://www.heart.org/en/healthy-living/healthy-eating/eat-smart/nutrition-basics/aha-diet-and-lifestyle-recommendations   NHS  https://www.nhs.uk/live-well/eat-well/   Last Reviewed Date   2020-03-27  Consumer Information Use and Disclaimer   This information is not specific medical advice and does not replace information you receive from your health care provider. This is only a brief summary of general information. It does NOT include all information about conditions, illnesses, injuries, tests, procedures, treatments, therapies, discharge instructions or life-style choices that may apply to you. You must talk with your health care provider for complete information about your health and treatment options. This information should not be used to decide whether or not to accept your health care providers advice, instructions or recommendations. Only your health care provider has the knowledge and training to provide advice that is right for you.  Copyright   Copyright © 2021 [employer] and its affiliates and/or licensors. All rights reserved.

## 2022-01-05 NOTE — PROGRESS NOTES
Subjective:       Patient ID: Pedro Luis Pérez is a 56 y.o. male.    Chief Complaint: Hyperlipidemia, Diabetes, History of kidney stone, Sinus Problem, and Pulmonary Nodules    Patient is a 56-year-old gentleman who comes for follow-up.  His underlying medical issues include the following:-    1.-type 2 diabetes mellitus on multiple medications in past  2.-history of arthritis and trigger finger.  3.-hyperlipidemia currently not on any medications.    Please note that his blood pressures are generally good.  He has been prescribed ramipril for renal protection.    His follow-up and compliance has been rather very patchy and [place].  Last time he was seen was on 12/10/2020.  He skipped a whole year for follow-up.    Most of his labs which were ordered in December 7 21 her pending at this point.    Recent labs have shown a normal PSA.  Blood sugars are elevated.  Kidney and liver tests are okay.  HIV screening on a routine basis is normal.  His lipid panel is somewhat elevated.  He agrees to take a lipid lowering medication.    History of kidney stones has been noted on prior imaging.  This does not seem to be bothering him.  He is status post cholecystectomy.    One of the x-rays in past had raise the question of an nodule but a repeat x-ray did not confirm that.    Diabetes  He presents for his follow-up diabetic visit. He has type 2 diabetes mellitus. His disease course has been worsening. Pertinent negatives for hypoglycemia include no confusion, headaches, nervousness/anxiousness, pallor, seizures, speech difficulty or tremors. Pertinent negatives for diabetes include no chest pain, no fatigue, no polydipsia, no polyphagia, no polyuria and no weakness. There are no hypoglycemic complications. There are no diabetic complications. Risk factors for coronary artery disease include diabetes mellitus, dyslipidemia, male sex and sedentary lifestyle. Current diabetic treatment includes oral agent (triple therapy). He is  compliant with treatment some of the time. When asked about meal planning, he reported none. He has not had a previous visit with a dietitian. He rarely participates in exercise. An ACE inhibitor/angiotensin II receptor blocker is being taken. He does not see a podiatrist.  Hyperlipidemia  This is a chronic problem. The problem is uncontrolled. Pertinent negatives include no chest pain, myalgias or shortness of breath. Current antihyperlipidemic treatment includes statins. The current treatment provides mild improvement of lipids. Risk factors for coronary artery disease include male sex, a sedentary lifestyle, dyslipidemia and diabetes mellitus.   Anxiety  Presents for follow-up visit. Symptoms include compulsions, excessive worry, irritability and malaise. Patient reports no chest pain, confusion, nervous/anxious behavior or shortness of breath. Symptoms occur occasionally.       Sinus Problem  This is a chronic problem. The current episode started more than 1 year ago. The problem is unchanged. Pertinent negatives include no chills, congestion, coughing, diaphoresis, ear pain, headaches, shortness of breath, sneezing or sore throat. Treatments tried: Astelin nasal spray. The treatment provided moderate relief.       Past Medical History:   Diagnosis Date    Anxiety     Diabetes mellitus, type 2     History of colonoscopy 11/5/2020    Dr. Stephanie Morin.  Entire portion of the ileum was normal.  Nonbleeding internal hemorrhoids.  External examined colon is normal.  No specimens collected.  Repeat colonoscopy in 5 years for surveillance.     Social History     Socioeconomic History    Marital status:      Spouse name: Regla    Number of children: 4   Occupational History    Occupation: Self Employed- [employer]   Tobacco Use    Smoking status: Never Smoker    Smokeless tobacco: Former User     Types: Chew   Substance and Sexual Activity    Alcohol use: No    Drug use: No    Sexual activity: Yes      Partners: Female     Social Determinants of Health     Financial Resource Strain: Low Risk     Difficulty of Paying Living Expenses: Not very hard   Food Insecurity: No Food Insecurity    Worried About Running Out of Food in the Last Year: Never true    Ran Out of Food in the Last Year: Never true   Transportation Needs: No Transportation Needs    Lack of Transportation (Medical): No    Lack of Transportation (Non-Medical): No   Physical Activity: Inactive    Days of Exercise per Week: 0 days    Minutes of Exercise per Session: 0 min   Stress: No Stress Concern Present    Feeling of Stress : Only a little   Housing Stability: Low Risk     Unable to Pay for Housing in the Last Year: No    Number of Places Lived in the Last Year: 1    Unstable Housing in the Last Year: No     Past Surgical History:   Procedure Laterality Date    COLONOSCOPY      x 2-3    LAPAROSCOPIC CHOLECYSTECTOMY N/A 5/10/2021    Procedure: CHOLECYSTECTOMY, LAPAROSCOPIC;  Surgeon: Gibran Lowe III, MD;  Location: [facility];  Service: General;  Laterality: N/A;     Family History   Problem Relation Age of Onset    Heart disease Mother     Diabetes Father        Review of Systems   Constitutional: Positive for irritability. Negative for activity change, appetite change, chills, diaphoresis, fatigue, fever and unexpected weight change (gained 3 lbs.).   HENT: Negative for congestion, ear pain, postnasal drip, sneezing, sore throat and trouble swallowing.    Eyes: Negative for pain, discharge, redness and visual disturbance.   Respiratory: Negative for cough, chest tightness and shortness of breath.    Cardiovascular: Negative for chest pain and leg swelling.        Patient is on ACE inhibitor to protect his kidneys. He has normal blood pressure otherwise.   Gastrointestinal: Negative for abdominal distention, abdominal pain, blood in stool, constipation and diarrhea.   Endocrine: Negative for cold intolerance, heat intolerance,  "polydipsia, polyphagia and polyuria.        Patient has mild dyslipidemia. Patient also has diabetes mellitus type 2.  Hemoglobin A1c is greater than 8.  Lipid panel is also un controlled.   Genitourinary: Negative for dysuria, frequency, hematuria and scrotal swelling.   Musculoskeletal: Negative for arthralgias, back pain, gait problem and myalgias.        Trigger finger left 3rd digit.  This seems to be getting better without any orthopedic or surgical intervention.   Skin: Negative for color change, pallor, rash and wound.   Allergic/Immunologic: Negative for environmental allergies, food allergies and immunocompromised state.   Neurological: Negative for tremors, seizures, speech difficulty, weakness, light-headedness and headaches.   Hematological: Negative for adenopathy. Does not bruise/bleed easily.   Psychiatric/Behavioral: Negative for confusion and hallucinations. The patient is not nervous/anxious.          Objective:      Blood pressure 102/62, pulse 83, height 5' 8" (1.727 m), weight 73 kg (161 lb). Body mass index is 24.48 kg/m².  Physical Exam  Vitals and nursing note reviewed.   Constitutional:       General: He is not in acute distress.     Appearance: He is well-developed.      Comments: BMI is 24.48   HENT:      Head: Normocephalic and atraumatic.   Eyes:      Conjunctiva/sclera: Conjunctivae normal.   Neck:      Thyroid: No thyromegaly.      Vascular: No JVD.      Trachea: No tracheal deviation.   Cardiovascular:      Rate and Rhythm: Normal rate and regular rhythm.      Pulses:           Dorsalis pedis pulses are 1+ on the right side and 1+ on the left side.        Posterior tibial pulses are 1+ on the right side and 1+ on the left side.      Heart sounds: Normal heart sounds. No murmur heard.  No friction rub. No gallop.    Pulmonary:      Effort: Pulmonary effort is normal. No respiratory distress.      Breath sounds: Normal breath sounds. No wheezing or rales.   Abdominal:      General: " Bowel sounds are normal. There is no distension.      Palpations: Abdomen is soft.      Tenderness: There is no abdominal tenderness.   Musculoskeletal:         General: No tenderness or deformity.        Hands:       Cervical back: Normal range of motion and neck supple.      Right lower leg: No edema.      Left lower leg: No edema.      Right foot: Normal range of motion. No deformity.      Left foot: Normal range of motion. No deformity.   Feet:      Right foot:      Protective Sensation: 5 sites tested. 3 sites sensed.      Skin integrity: No ulcer or blister.      Left foot:      Protective Sensation: 5 sites tested. 5 sites sensed.      Skin integrity: No ulcer or blister.   Skin:     General: Skin is warm and dry.      Findings: No lesion or rash.   Neurological:      Mental Status: He is alert and oriented to person, place, and time.      Deep Tendon Reflexes: Reflexes are normal and symmetric.   Psychiatric:         Attention and Perception: Attention normal.         Speech: Speech normal.           Assessment:       1. Type 2 diabetes mellitus without complication, without long-term current use of insulin    2. Dyslipidemia    3. Nephrolithiasis    4. Trigger middle finger of left hand    5. Pulmonary nodule    6. Seasonal allergic rhinitis due to pollen           Telephone on 12/03/2021   Component Date Value Ref Range Status    Hemoglobin A1c 01/04/2022 8.3* 4.8 - 5.6 % Final    Glucose 01/04/2022 214* 65 - 99 mg/dL Final    BUN 01/04/2022 14  6 - 24 mg/dL Final    Creatinine 01/04/2022 0.77  0.76 - 1.27 mg/dL Final    eGFR if non African American 01/04/2022 100  >59 mL/min/1.73 Final    eGFR if  01/04/2022 116  >59 mL/min/1.73 Final    BUN/Creatinine Ratio 01/04/2022 18  9 - 20 Final    Sodium 01/04/2022 139  134 - 144 mmol/L Final    Potassium 01/04/2022 4.6  3.5 - 5.2 mmol/L Final    Chloride 01/04/2022 104  96 - 106 mmol/L Final    CO2 01/04/2022 20  20 - 29 mmol/L Final     Calcium 01/04/2022 9.5  8.7 - 10.2 mg/dL Final    Protein, Total 01/04/2022 6.8  6.0 - 8.5 g/dL Final    Albumin 01/04/2022 4.7  3.8 - 4.9 g/dL Final    Globulin, Total 01/04/2022 2.1  1.5 - 4.5 g/dL Final    Albumin/Globulin Ratio 01/04/2022 2.2  1.2 - 2.2 Final    Total Bilirubin 01/04/2022 0.4  0.0 - 1.2 mg/dL Final    Alkaline Phosphatase 01/04/2022 74  44 - 121 IU/L Final    AST 01/04/2022 13  0 - 40 IU/L Final    ALT 01/04/2022 23  0 - 44 IU/L Final    Cholesterol 01/04/2022 225* 100 - 199 mg/dL Final    Triglycerides 01/04/2022 135  0 - 149 mg/dL Final    HDL 01/04/2022 41  >39 mg/dL Final    VLDL Cholesterol Blaze 01/04/2022 25  5 - 40 mg/dL Final    LDL Calculated 01/04/2022 159* 0 - 99 mg/dL Final    Creatinine, Urine 01/04/2022 WILL FOLLOW   Preliminary    Microalb, Ur 01/04/2022 WILL FOLLOW   Preliminary    Microalb/Crt. Ratio 01/04/2022 WILL FOLLOW   Preliminary    PSA - [facility] 01/04/2022 0.8  0.0 - 4.0 ng/mL Final    HIV Screen 4th Generation wRfx 01/04/2022 Non Reactive  Non Reactive Final         Plan:           Type 2 diabetes mellitus without complication, without long-term current use of insulin  Comments:  Hemoglobin A1c is greater than 8. He would like to try to watch his diet little bit more before making a final decision on raising medications.    Orders:  -     Hemoglobin A1C; Future; Expected date: 04/11/2022    Dyslipidemia  Comments:  Patient agrees to start rosuvastatin 5 mg at bedtime.  Repeat labs in 3-4 months time.  Orders:  -     rosuvastatin (CRESTOR) 5 MG tablet; Take 1 tablet (5 mg total) by mouth every evening.  Dispense: 90 tablet; Refill: 3  -     Lipid Panel; Future; Expected date: 04/11/2022  -     ALT (SGPT); Future; Expected date: 04/11/2022    Nephrolithiasis  Comments:  Currently asymptomatic.  Advised to remain hydrated.    Trigger middle finger of left hand    Pulmonary nodule  Comments:  Recent x-ray had shown resolution of this issue.    Seasonal  allergic rhinitis due to pollen  Comments:  Currently using Astelin      Patient's sugars are elevated today.  He states that this is because of steroid administration.  I will check again another hemoglobin A1c in 4 months and see if there is any improvement.    Generally his blood pressures are doing okay.  He takes ramipril for renal protection.    His trigger finger seems to be doing okay.  He can bend his test and fingers.    He does have elevated cholesterol and he agrees to start rosuvastatin or Crestor 5 mg at bedtime.  I will check a lipid panel again at follow-up and see as to how much improvement is there.    He does take Astelin for sinus.    He does take Lexapro to keep his mood even and prevent him from having a nervous or anxiety attack.  It is mostly from family issues.  Otherwise he considers himself to be high happy go glo man.    Recommended eye checkup please.    He did have COVID infection and at this point he is on the edge about the vaccine.  I have recommended him to get the vaccine or continue to observe precautions.    Next time will offer him pneumonia vaccine also.    Follow-up in 4 months.  Patient has been advised compliance.    Spent yamileth 30 minutes with patient which involved review of pts medical conditions, labs, medications and with 50% of time face-to-face discussion about medical problems, management and any applicable changes.        Current Outpatient Medications:     aspirin (ECOTRIN) 81 MG EC tablet, Take 1 tablet (81 mg total) by mouth Daily., Disp: 100 tablet, Rfl: 1    azelastine (ASTELIN) 137 mcg (0.1 %) nasal spray, SPRAY 1 SPAY IN EACH NOSTRIL TWICE DAILY, Disp: 30 mL, Rfl: 2    blood sugar diagnostic Strp, 1 each by Other route., Disp: , Rfl:     EScitalopram oxalate (LEXAPRO) 10 MG tablet, , Disp: , Rfl:     JANUMET -1,000 mg TM24, TAKE 1 TABLET BY MOUTH EVERY DAY, Disp: 90 tablet, Rfl: 1    JARDIANCE 10 mg tablet, TAKE 1 TABLET(10 MG) BY MOUTH EVERY DAY,  Disp: 90 tablet, Rfl: 1    ramipriL (ALTACE) 1.25 MG capsule, Take 1.25 mg by mouth once daily., Disp: , Rfl:     rosuvastatin (CRESTOR) 5 MG tablet, Take 1 tablet (5 mg total) by mouth every evening., Disp: 90 tablet, Rfl: 3

## 2022-06-08 DIAGNOSIS — E11.9 TYPE 2 DIABETES MELLITUS WITHOUT COMPLICATION, WITHOUT LONG-TERM CURRENT USE OF INSULIN: ICD-10-CM

## 2022-06-09 DIAGNOSIS — E11.9 TYPE 2 DIABETES MELLITUS WITHOUT COMPLICATION, WITHOUT LONG-TERM CURRENT USE OF INSULIN: ICD-10-CM

## 2022-06-09 RX ORDER — EMPAGLIFLOZIN 10 MG/1
10 TABLET, FILM COATED ORAL DAILY
Qty: 90 TABLET | Refills: 1 | OUTPATIENT
Start: 2022-06-09

## 2022-06-09 RX ORDER — EMPAGLIFLOZIN 10 MG/1
10 TABLET, FILM COATED ORAL DAILY
Qty: 90 TABLET | Refills: 1 | Status: SHIPPED | OUTPATIENT
Start: 2022-06-09 | End: 2022-06-30 | Stop reason: DRUGHIGH

## 2022-06-09 RX ORDER — RAMIPRIL 1.25 MG/1
1.25 CAPSULE ORAL DAILY
OUTPATIENT
Start: 2022-06-09

## 2022-06-29 LAB
ALT SERPL-CCNC: 22 IU/L (ref 0–44)
CHOLEST SERPL-MCNC: 212 MG/DL (ref 100–199)
HBA1C MFR BLD: 9.2 % (ref 4.8–5.6)
HDLC SERPL-MCNC: 38 MG/DL
LDLC SERPL CALC-MCNC: 141 MG/DL (ref 0–99)
TRIGL SERPL-MCNC: 181 MG/DL (ref 0–149)
VLDLC SERPL CALC-MCNC: 33 MG/DL (ref 5–40)

## 2022-06-30 ENCOUNTER — OFFICE VISIT (OUTPATIENT)
Dept: FAMILY MEDICINE | Facility: CLINIC | Age: 57
End: 2022-06-30
Payer: COMMERCIAL

## 2022-06-30 VITALS
BODY MASS INDEX: 24.25 KG/M2 | HEIGHT: 68 IN | SYSTOLIC BLOOD PRESSURE: 108 MMHG | HEART RATE: 77 BPM | WEIGHT: 160 LBS | DIASTOLIC BLOOD PRESSURE: 71 MMHG

## 2022-06-30 DIAGNOSIS — E78.5 DYSLIPIDEMIA: Chronic | ICD-10-CM

## 2022-06-30 DIAGNOSIS — N20.0 NEPHROLITHIASIS: Chronic | ICD-10-CM

## 2022-06-30 DIAGNOSIS — L73.2 HIDRADENITIS AXILLARIS: ICD-10-CM

## 2022-06-30 DIAGNOSIS — F39 MOOD DISORDER: Chronic | ICD-10-CM

## 2022-06-30 DIAGNOSIS — E11.9 TYPE 2 DIABETES MELLITUS WITHOUT COMPLICATION, WITHOUT LONG-TERM CURRENT USE OF INSULIN: Primary | Chronic | ICD-10-CM

## 2022-06-30 DIAGNOSIS — R06.6 HICCUPS: ICD-10-CM

## 2022-06-30 PROCEDURE — 1159F PR MEDICATION LIST DOCUMENTED IN MEDICAL RECORD: ICD-10-PCS | Mod: CPTII,S$GLB,, | Performed by: INTERNAL MEDICINE

## 2022-06-30 PROCEDURE — 3074F PR MOST RECENT SYSTOLIC BLOOD PRESSURE < 130 MM HG: ICD-10-PCS | Mod: CPTII,S$GLB,, | Performed by: INTERNAL MEDICINE

## 2022-06-30 PROCEDURE — 3008F BODY MASS INDEX DOCD: CPT | Mod: CPTII,S$GLB,, | Performed by: INTERNAL MEDICINE

## 2022-06-30 PROCEDURE — 4010F PR ACE/ARB THEARPY RXD/TAKEN: ICD-10-PCS | Mod: CPTII,S$GLB,, | Performed by: INTERNAL MEDICINE

## 2022-06-30 PROCEDURE — 1160F RVW MEDS BY RX/DR IN RCRD: CPT | Mod: CPTII,S$GLB,, | Performed by: INTERNAL MEDICINE

## 2022-06-30 PROCEDURE — 3078F DIAST BP <80 MM HG: CPT | Mod: CPTII,S$GLB,, | Performed by: INTERNAL MEDICINE

## 2022-06-30 PROCEDURE — 3078F PR MOST RECENT DIASTOLIC BLOOD PRESSURE < 80 MM HG: ICD-10-PCS | Mod: CPTII,S$GLB,, | Performed by: INTERNAL MEDICINE

## 2022-06-30 PROCEDURE — 3066F PR DOCUMENTATION OF TREATMENT FOR NEPHROPATHY: ICD-10-PCS | Mod: CPTII,S$GLB,, | Performed by: INTERNAL MEDICINE

## 2022-06-30 PROCEDURE — 99214 PR OFFICE/OUTPT VISIT, EST, LEVL IV, 30-39 MIN: ICD-10-PCS | Mod: S$GLB,,, | Performed by: INTERNAL MEDICINE

## 2022-06-30 PROCEDURE — 1159F MED LIST DOCD IN RCRD: CPT | Mod: CPTII,S$GLB,, | Performed by: INTERNAL MEDICINE

## 2022-06-30 PROCEDURE — 3008F PR BODY MASS INDEX (BMI) DOCUMENTED: ICD-10-PCS | Mod: CPTII,S$GLB,, | Performed by: INTERNAL MEDICINE

## 2022-06-30 PROCEDURE — 3066F NEPHROPATHY DOC TX: CPT | Mod: CPTII,S$GLB,, | Performed by: INTERNAL MEDICINE

## 2022-06-30 PROCEDURE — 3061F PR NEG MICROALBUMINURIA RESULT DOCUMENTED/REVIEW: ICD-10-PCS | Mod: CPTII,S$GLB,, | Performed by: INTERNAL MEDICINE

## 2022-06-30 PROCEDURE — 3061F NEG MICROALBUMINURIA REV: CPT | Mod: CPTII,S$GLB,, | Performed by: INTERNAL MEDICINE

## 2022-06-30 PROCEDURE — 3074F SYST BP LT 130 MM HG: CPT | Mod: CPTII,S$GLB,, | Performed by: INTERNAL MEDICINE

## 2022-06-30 PROCEDURE — 99214 OFFICE O/P EST MOD 30 MIN: CPT | Mod: S$GLB,,, | Performed by: INTERNAL MEDICINE

## 2022-06-30 PROCEDURE — 1160F PR REVIEW ALL MEDS BY PRESCRIBER/CLIN PHARMACIST DOCUMENTED: ICD-10-PCS | Mod: CPTII,S$GLB,, | Performed by: INTERNAL MEDICINE

## 2022-06-30 PROCEDURE — 4010F ACE/ARB THERAPY RXD/TAKEN: CPT | Mod: CPTII,S$GLB,, | Performed by: INTERNAL MEDICINE

## 2022-06-30 RX ORDER — EMPAGLIFLOZIN 25 MG/1
25 TABLET, FILM COATED ORAL DAILY
Qty: 90 TABLET | Refills: 1 | Status: SHIPPED | OUTPATIENT
Start: 2022-06-30 | End: 2022-11-03 | Stop reason: SDUPTHER

## 2022-06-30 RX ORDER — MUPIROCIN 20 MG/G
OINTMENT TOPICAL
Qty: 22 G | Refills: 0 | Status: SHIPPED | OUTPATIENT
Start: 2022-06-30

## 2022-06-30 RX ORDER — ROSUVASTATIN CALCIUM 5 MG/1
5 TABLET, COATED ORAL
Qty: 36 TABLET | Refills: 3 | Status: SHIPPED | OUTPATIENT
Start: 2022-07-01 | End: 2023-04-26

## 2022-06-30 NOTE — PROGRESS NOTES
Subjective:       Patient ID: Pedro Luis Pérez is a 56 y.o. male.    Chief Complaint: Hyperlipidemia, Follow-up, Diabetes, Sinus Problem, and Mood diorder    Mr. Jeramie Pérez is a 56-year-old gentleman who comes for follow-up.    Underlying medical issues of hyperlipidemia, type 2 diabetes mellitus, mood disorder on Lexapro have been noted.    His hemoglobin A1c has gone further up at this point and greater than 9. He states that he has been bad with his diet.  Weight is not too much up.    Her diabetes is currently on Janumet and Jardiance 10 mg.    Off late he has also been reporting hiccups.  He may also be feeling that certain foods get stuck.  He is not sure about the coordination with different type of foods and he will check on that.    For kidney protection he takes ramipril 1.25 mg. He is also on aspirin.    For sinus problems he takes Astelin nasal spray.      Hyperlipidemia  This is a chronic problem. The problem is uncontrolled. Pertinent negatives include no chest pain, myalgias or shortness of breath. Current antihyperlipidemic treatment includes statins. The current treatment provides mild improvement of lipids. Risk factors for coronary artery disease include male sex, a sedentary lifestyle, dyslipidemia and diabetes mellitus.   Follow-up  Pertinent negatives include no abdominal pain, arthralgias, chest pain, chills, congestion, coughing, diaphoresis, fatigue, fever, headaches, myalgias, rash, sore throat or weakness.   Diabetes  He presents for his follow-up diabetic visit. He has type 2 diabetes mellitus. His disease course has been worsening (HEMOGLOBIN A1C HAS COME GONE GREATER THAN 9. ). Pertinent negatives for hypoglycemia include no confusion, headaches, nervousness/anxiousness, pallor, seizures, speech difficulty or tremors. Pertinent negatives for diabetes include no chest pain, no fatigue, no polydipsia, no polyphagia, no polyuria and no weakness. There are no hypoglycemic complications.  There are no diabetic complications. Risk factors for coronary artery disease include diabetes mellitus, dyslipidemia, male sex and sedentary lifestyle. Current diabetic treatment includes oral agent (triple therapy). He is compliant with treatment some of the time. When asked about meal planning, he reported none. He has not had a previous visit with a dietitian. He rarely participates in exercise. An ACE inhibitor/angiotensin II receptor blocker is being taken. He does not see a podiatrist.  Sinus Problem  This is a chronic problem. The current episode started more than 1 year ago. The problem is unchanged. Pertinent negatives include no chills, congestion, coughing, diaphoresis, ear pain, headaches, shortness of breath, sneezing or sore throat. Treatments tried: Astelin nasal spray. The treatment provided moderate relief.   Anxiety  Presents for follow-up (PATIENT IS CURRENTLY ON LEXAPRO.) visit. Symptoms include compulsions, excessive worry, irritability and malaise. Patient reports no chest pain, confusion, nervous/anxious behavior or shortness of breath. Symptoms occur occasionally. The quality of sleep is fair.       Abscess  Chronicity:  NewProgression Since Onset: unchanged  Location:  Shoulder/arm (RIGHT ARMPIT-AXILLARY FOLLICULITIS)  Associated Symptoms: no fever, no chills  Worsened by:  Nothing  HE WAS ALSO INTERESTED IN KNOWING IF HE COULD GET OFF LEXAPRO.  THIS DECISION HAS TO BE MADE WITH HIS FAMILY MEMBERS.  IF HE CONTINUES TO BE SNAPPY OR IRRITABLE, THEN HE SHOULD CONTINUE ON THIS MEDICATION.  ON THE OTHER HAND IF HE IS A HAPPY CAMPER AND TAKES LIFE IN STRIDE, THEN WE CAN CONSIDER TAPERING OFF THE MEDICATION.    HE DOES HAVE SOME ZITS IN HIS RIGHT ARMPIT.  HE DOES NOT SHAVE BUT HE LIPS FREQUENTLY.  NO ABSCESS.    HE ALSO MENTIONS ABOUT SOME HICCUPS AND DIFFICULTY SWALLOWING FOODS.  HE WILL LOOK INTO THAT.    Past Medical History:   Diagnosis Date    Anxiety     Diabetes mellitus, type 2      History of colonoscopy 11/5/2020    Dr. Stephanie Morin.  Entire portion of the ileum was normal.  Nonbleeding internal hemorrhoids.  External examined colon is normal.  No specimens collected.  Repeat colonoscopy in 5 years for surveillance.     Social History     Socioeconomic History    Marital status:      Spouse name: Regla    Number of children: 4   Occupational History    Occupation: Self Employed- [employer]   Tobacco Use    Smoking status: Never Smoker    Smokeless tobacco: Former User     Types: Chew     Quit date: 2000   Substance and Sexual Activity    Alcohol use: No    Drug use: No    Sexual activity: Yes     Partners: Female     Social Determinants of Health     Financial Resource Strain: Low Risk     Difficulty of Paying Living Expenses: Not very hard   Food Insecurity: No Food Insecurity    Worried About Running Out of Food in the Last Year: Never true    Ran Out of Food in the Last Year: Never true   Transportation Needs: No Transportation Needs    Lack of Transportation (Medical): No    Lack of Transportation (Non-Medical): No   Physical Activity: Inactive    Days of Exercise per Week: 0 days    Minutes of Exercise per Session: 0 min   Stress: No Stress Concern Present    Feeling of Stress : Only a little   Housing Stability: Low Risk     Unable to Pay for Housing in the Last Year: No    Number of Places Lived in the Last Year: 1    Unstable Housing in the Last Year: No     Past Surgical History:   Procedure Laterality Date    COLONOSCOPY      x 2-3    LAPAROSCOPIC CHOLECYSTECTOMY N/A 5/10/2021    Procedure: CHOLECYSTECTOMY, LAPAROSCOPIC;  Surgeon: Gibran Lowe III, MD;  Location: [facility];  Service: General;  Laterality: N/A;     Family History   Problem Relation Age of Onset    Heart disease Mother     Diabetes Father        Review of Systems   Constitutional: Positive for irritability. Negative for activity change, appetite change, chills, diaphoresis,  fatigue, fever and unexpected weight change (gained 3 lbs.).   HENT: Negative for congestion, ear pain, postnasal drip, sneezing, sore throat and trouble swallowing.    Eyes: Negative for pain, discharge, redness and visual disturbance.   Respiratory: Negative for cough, chest tightness and shortness of breath.    Cardiovascular: Negative for chest pain and leg swelling.        Patient is on ACE inhibitor to protect his kidneys. He has normal blood pressure otherwise.   Gastrointestinal: Negative for abdominal distention, abdominal pain, blood in stool, constipation and diarrhea.        SOME DIFFICULTY SWALLOWING FOOD AND HICCUPS.  HE WILL LOOK INTO THAT IN MORE DETAILS AND SEE HOW HE DOES.   Endocrine: Negative for cold intolerance, heat intolerance, polydipsia, polyphagia and polyuria.        Patient has mild dyslipidemia. Patient also has diabetes mellitus type 2.  Hemoglobin A1c is greater than 9.  Lipid panel is also un controlled.  HE WAS PROBABLY NOT TAKING HIS CHOLESTEROL MEDICATION AND A NEW PRESCRIPTION HAS BEEN SENT AT A LOWER DOSAGE.   Genitourinary: Negative for dysuria, frequency, hematuria and scrotal swelling.   Musculoskeletal: Negative for arthralgias, back pain, gait problem and myalgias.        Trigger finger left 3rd digit.  This seems to be getting better without any orthopedic or surgical intervention.   Skin: Negative for color change, pallor, rash and wound.        SOME ZITS IN HIS RIGHT ARMPIT.   Allergic/Immunologic: Negative for environmental allergies, food allergies and immunocompromised state.   Neurological: Negative for tremors, seizures, speech difficulty, weakness, light-headedness and headaches.   Hematological: Negative for adenopathy. Does not bruise/bleed easily.   Psychiatric/Behavioral: Negative for confusion and hallucinations. The patient is not nervous/anxious.         CURRENTLY PATIENT IS ON LEXAPRO.  HE GENERALLY SEEMS TO BE CONTENT BUT HE SHOULD CHECK WITH HIS WIFE IF  "EVERYTHING IS OKAY AND HE IS NOT AS SNIPPY OR SNAPPY AS BEFORE.         Objective:      Blood pressure 108/71, pulse 77, height 5' 8" (1.727 m), weight 72.6 kg (160 lb). Body mass index is 24.33 kg/m².  Physical Exam  Vitals and nursing note reviewed.   Constitutional:       General: He is not in acute distress.     Appearance: He is well-developed.      Comments: BMI is 24.33   HENT:      Head: Normocephalic and atraumatic.   Eyes:      Conjunctiva/sclera: Conjunctivae normal.   Neck:      Thyroid: No thyromegaly.      Vascular: No JVD.      Trachea: No tracheal deviation.   Cardiovascular:      Rate and Rhythm: Normal rate and regular rhythm.      Heart sounds: Normal heart sounds. No murmur heard.    No friction rub. No gallop.   Pulmonary:      Effort: Pulmonary effort is normal. No respiratory distress.      Breath sounds: Normal breath sounds. No wheezing or rales.   Abdominal:      General: There is no distension.      Palpations: Abdomen is soft.      Tenderness: There is no abdominal tenderness.   Musculoskeletal:         General: No tenderness or deformity.        Hands:       Cervical back: Normal range of motion and neck supple.      Right lower leg: No edema.      Left lower leg: No edema.   Feet:      Left foot:      Skin integrity: No blister.   Skin:     General: Skin is warm and dry.      Findings: No lesion or rash.   Neurological:      Mental Status: He is alert and oriented to person, place, and time.      Deep Tendon Reflexes: Reflexes are normal and symmetric.   Psychiatric:         Attention and Perception: Attention normal.         Speech: Speech normal.      Comments: NOT OVERTLY DEPRESSED BUT PERHAPS SLIGHTLY ANHEDONIC.           Assessment:       1. Type 2 diabetes mellitus without complication, without long-term current use of insulin    2. Dyslipidemia    3. Nephrolithiasis    4. Mood disorder    5. Hidradenitis axillaris    6. Hiccups           No visits with results within 3 Month(s) " from this visit.   Latest known visit with results is:   Office Visit on 01/05/2022   Component Date Value Ref Range Status    Cholesterol 06/28/2022 212 (A) 100 - 199 mg/dL Final    Triglycerides 06/28/2022 181 (A) 0 - 149 mg/dL Final    HDL 06/28/2022 38 (A) >39 mg/dL Final    VLDL Cholesterol Blaze 06/28/2022 33  5 - 40 mg/dL Final    LDL Calculated 06/28/2022 141 (A) 0 - 99 mg/dL Final    Hemoglobin A1c 06/28/2022 9.2 (A) 4.8 - 5.6 % Final    ALT 06/28/2022 22  0 - 44 IU/L Final         Plan:           Type 2 diabetes mellitus without complication, without long-term current use of insulin  Comments:  Increase Jardiance to 25 mg. Check A1c in 3 months time.  Orders:  -     empagliflozin (JARDIANCE) 25 mg tablet; Take 1 tablet (25 mg total) by mouth once daily.  Dispense: 90 tablet; Refill: 1  -     Hemoglobin A1C; Future; Expected date: 09/26/2022    Dyslipidemia  Comments:  New prescription sent for Crestor.  5 mg on Monday, Wednesday and Friday.  Possibly some muscle aches in past.  Orders:  -     rosuvastatin (CRESTOR) 5 MG tablet; Take 1 tablet (5 mg total) by mouth every Mon, Wed, Fri. Take the medication 3 times a week at bedtime.  Dispense: 36 tablet; Refill: 3  -     Lipid Panel; Future; Expected date: 09/26/2022  -     ALT (SGPT); Future; Expected date: 09/26/2022    Nephrolithiasis  Comments:  Kidney stone is not bothering him at this point.  Keep hydrated to keep the urine flowing.    Mood disorder  Comments:  Patient continues with Lexapro at this point.  He was asking if he could stop this medication.  Perhaps he needs to check with his family and wife especially if    Hidradenitis axillaris  Comments:  Patient seems to have some folliculitis and probably small sits in the right armpit.  Keep it clean.  Apply mupirocin or Bactroban at nighttime.  No shaving  Orders:  -     mupirocin (BACTROBAN) 2 % ointment; Apply topically on the armpit at nighttime.  Dispense: 22 g; Refill:  0    Hiccups  Comments:  CAUSE OF HICCUPS NOT KNOWN.  PLEASE DRINK WATER BEFORE YOU HAVE MEALS.  WATCH OUT FOR REFLUX.      Patient's blood sugars have grown greater than 9.    He has not put on much of weight.    He does admit that he has been not very compliant with his diet and I will urged him to get more compliant with his diet.    At this point I will increase the Jardiance from 10 mg to 25 mg to get his sugars down.    He also reports a new complain of some difficulty swallowing and hiccups.  He will give me further details on this as to when this happens and how it happens and with what food it happens.    I would like to see him back in for 3 months time to catch up.    He is also on Lexapro for stress and anxiety and he believes that he is fine and he should not take this medication.  I have advised him to check with his family members also if it is okay for him to taper off this medication.  If everything is okay and he is really fine with no evidence of either snappy nests or irritability, then we can taper it off.    HE HAD ALSO COMPLAINED OF SOME DEGREE OF DYSPHAGIA AND HICCUPS.  HE WILL CHECK INTO THAT AND LET US KNOW NEXT TIME.    Spent yamileth 30 minutes with patient which involved review of pts medical conditions, labs, medications and with 50% of time face-to-face discussion about medical problems, management and any applicable changes.          Current Outpatient Medications:     azelastine (ASTELIN) 137 mcg (0.1 %) nasal spray, SPRAY 1 SPAY IN EACH NOSTRIL TWICE DAILY, Disp: 30 mL, Rfl: 2    blood sugar diagnostic Strp, 1 each by Other route., Disp: , Rfl:     EScitalopram oxalate (LEXAPRO) 10 MG tablet, , Disp: , Rfl:     JANUMET -1,000 mg TM24, TAKE 1 TABLET BY MOUTH EVERY DAY, Disp: 90 tablet, Rfl: 1    ramipriL (ALTACE) 1.25 MG capsule, TAKE 1 CAPSULE(1.25 MG) BY MOUTH EVERY DAY, Disp: 90 capsule, Rfl: 1    aspirin (ECOTRIN) 81 MG EC tablet, Take 1 tablet (81 mg total) by mouth  Daily. (Patient not taking: Reported on 6/30/2022), Disp: 100 tablet, Rfl: 1    empagliflozin (JARDIANCE) 25 mg tablet, Take 1 tablet (25 mg total) by mouth once daily., Disp: 90 tablet, Rfl: 1    mupirocin (BACTROBAN) 2 % ointment, Apply topically on the armpit at nighttime., Disp: 22 g, Rfl: 0    [START ON 7/1/2022] rosuvastatin (CRESTOR) 5 MG tablet, Take 1 tablet (5 mg total) by mouth every Mon, Wed, Fri. Take the medication 3 times a week at bedtime., Disp: 36 tablet, Rfl: 3

## 2022-08-16 ENCOUNTER — TELEPHONE (OUTPATIENT)
Dept: FAMILY MEDICINE | Facility: CLINIC | Age: 57
End: 2022-08-16

## 2022-08-16 NOTE — TELEPHONE ENCOUNTER
The following medication needs a prior authorization:     Medication Name: Janumet XR    Dosage: 100-1,000  mg    Frequency: daily    Directions for use: Take 1 tablet by mouth every day    Diagnosis: Type 2 diabetes mellitus without complication, without long-term current use of insulin    Is the request for a reauthorization? yes    Is the patient currently stable on therapy? yes    Please list all therapeutic alternatives previously used with start/end dates and outcome:  empagliflozin 10 mg (5-31-17 - present)  Janumet 100-1,000 mg (8-6-19 - present)  Ramipril 1.25 mg (7-12-17 - present)

## 2022-11-03 ENCOUNTER — PATIENT MESSAGE (OUTPATIENT)
Dept: FAMILY MEDICINE | Facility: CLINIC | Age: 57
End: 2022-11-03

## 2022-11-03 DIAGNOSIS — E11.9 TYPE 2 DIABETES MELLITUS WITHOUT COMPLICATION, WITHOUT LONG-TERM CURRENT USE OF INSULIN: Chronic | ICD-10-CM

## 2022-11-07 ENCOUNTER — TELEPHONE (OUTPATIENT)
Dept: FAMILY MEDICINE | Facility: CLINIC | Age: 57
End: 2022-11-07

## 2022-11-07 NOTE — TELEPHONE ENCOUNTER
The following medication needs a prior authorization:     Medication Name: jardiance    Dosage: 25mg    Frequency: once daily     Directions for use: I tab daily     Diagnosis: e11.9    Is the request for a reauthorization? Yes     Is the patient currently stable on therapy? yes    Please list all therapeutic alternatives previously used with start/end dates and outcome:

## 2023-02-16 ENCOUNTER — TELEPHONE (OUTPATIENT)
Dept: FAMILY MEDICINE | Facility: CLINIC | Age: 58
End: 2023-02-16

## 2023-02-16 DIAGNOSIS — E11.9 TYPE 2 DIABETES MELLITUS WITHOUT COMPLICATION, WITHOUT LONG-TERM CURRENT USE OF INSULIN: ICD-10-CM

## 2023-02-16 RX ORDER — SITAGLIPTIN AND METFORMIN HYDROCHLORIDE 1000; 100 MG/1; MG/1
1 TABLET, FILM COATED, EXTENDED RELEASE ORAL DAILY
Qty: 90 TABLET | Refills: 1 | Status: SHIPPED | OUTPATIENT
Start: 2023-02-16 | End: 2023-02-17 | Stop reason: CLARIF

## 2023-02-16 NOTE — TELEPHONE ENCOUNTER
----- Message from Stephanie Vazquez sent at 2/16/2023 10:22 AM CST -----  Patient states he has an appt in April but needs his Janumet sent to [facility] [place].

## 2023-02-17 ENCOUNTER — PATIENT MESSAGE (OUTPATIENT)
Dept: FAMILY MEDICINE | Facility: CLINIC | Age: 58
End: 2023-02-17

## 2023-02-17 RX ORDER — LINAGLIPTIN AND METFORMIN HYDROCHLORIDE 5; 1000 MG/1; MG/1
1 TABLET, FILM COATED, EXTENDED RELEASE ORAL DAILY
Qty: 90 EACH | Refills: 1 | Status: SHIPPED | OUTPATIENT
Start: 2023-02-17 | End: 2023-08-21

## 2023-04-26 ENCOUNTER — OFFICE VISIT (OUTPATIENT)
Dept: FAMILY MEDICINE | Facility: CLINIC | Age: 58
End: 2023-04-26
Payer: COMMERCIAL

## 2023-04-26 VITALS
DIASTOLIC BLOOD PRESSURE: 69 MMHG | SYSTOLIC BLOOD PRESSURE: 107 MMHG | HEIGHT: 68 IN | WEIGHT: 163 LBS | HEART RATE: 96 BPM | BODY MASS INDEX: 24.71 KG/M2

## 2023-04-26 DIAGNOSIS — R10.31 RIGHT GROIN PAIN: ICD-10-CM

## 2023-04-26 DIAGNOSIS — R10.31 RIGHT LOWER QUADRANT ABDOMINAL PAIN: ICD-10-CM

## 2023-04-26 DIAGNOSIS — N20.0 NEPHROLITHIASIS: ICD-10-CM

## 2023-04-26 DIAGNOSIS — E11.9 TYPE 2 DIABETES MELLITUS WITHOUT COMPLICATION, WITHOUT LONG-TERM CURRENT USE OF INSULIN: Primary | ICD-10-CM

## 2023-04-26 DIAGNOSIS — R10.9 RT FLANK PAIN: Primary | ICD-10-CM

## 2023-04-26 PROCEDURE — 4010F PR ACE/ARB THEARPY RXD/TAKEN: ICD-10-PCS | Mod: CPTII,S$GLB,, | Performed by: INTERNAL MEDICINE

## 2023-04-26 PROCEDURE — 3078F PR MOST RECENT DIASTOLIC BLOOD PRESSURE < 80 MM HG: ICD-10-PCS | Mod: CPTII,S$GLB,, | Performed by: INTERNAL MEDICINE

## 2023-04-26 PROCEDURE — 4010F ACE/ARB THERAPY RXD/TAKEN: CPT | Mod: CPTII,S$GLB,, | Performed by: INTERNAL MEDICINE

## 2023-04-26 PROCEDURE — 1160F RVW MEDS BY RX/DR IN RCRD: CPT | Mod: CPTII,S$GLB,, | Performed by: INTERNAL MEDICINE

## 2023-04-26 PROCEDURE — 3074F SYST BP LT 130 MM HG: CPT | Mod: CPTII,S$GLB,, | Performed by: INTERNAL MEDICINE

## 2023-04-26 PROCEDURE — 1160F PR REVIEW ALL MEDS BY PRESCRIBER/CLIN PHARMACIST DOCUMENTED: ICD-10-PCS | Mod: CPTII,S$GLB,, | Performed by: INTERNAL MEDICINE

## 2023-04-26 PROCEDURE — 3074F PR MOST RECENT SYSTOLIC BLOOD PRESSURE < 130 MM HG: ICD-10-PCS | Mod: CPTII,S$GLB,, | Performed by: INTERNAL MEDICINE

## 2023-04-26 PROCEDURE — 1159F MED LIST DOCD IN RCRD: CPT | Mod: CPTII,S$GLB,, | Performed by: INTERNAL MEDICINE

## 2023-04-26 PROCEDURE — 3078F DIAST BP <80 MM HG: CPT | Mod: CPTII,S$GLB,, | Performed by: INTERNAL MEDICINE

## 2023-04-26 PROCEDURE — 1159F PR MEDICATION LIST DOCUMENTED IN MEDICAL RECORD: ICD-10-PCS | Mod: CPTII,S$GLB,, | Performed by: INTERNAL MEDICINE

## 2023-04-26 PROCEDURE — 3008F PR BODY MASS INDEX (BMI) DOCUMENTED: ICD-10-PCS | Mod: CPTII,S$GLB,, | Performed by: INTERNAL MEDICINE

## 2023-04-26 PROCEDURE — 99214 PR OFFICE/OUTPT VISIT, EST, LEVL IV, 30-39 MIN: ICD-10-PCS | Mod: S$GLB,,, | Performed by: INTERNAL MEDICINE

## 2023-04-26 PROCEDURE — 99214 OFFICE O/P EST MOD 30 MIN: CPT | Mod: S$GLB,,, | Performed by: INTERNAL MEDICINE

## 2023-04-26 PROCEDURE — 3008F BODY MASS INDEX DOCD: CPT | Mod: CPTII,S$GLB,, | Performed by: INTERNAL MEDICINE

## 2023-04-26 NOTE — PROGRESS NOTES
Type 2 diabetes mellitus without complication, without long-term current use of insulin  -     Microalbumin/Creatinine Ratio, Urine; Future; Expected date: 04/26/2023

## 2023-04-26 NOTE — PROGRESS NOTES
Subjective:       Patient ID: Pedro Luis Pérez is a 57 y.o. male.    Chief Complaint: Back Pain, Abdominal Pain, and Groin Pain    Mr. Jeramie Pérez is a 57-year-old gentleman who comes for follow-up after 10 months.    Underlying medical issues of hyperlipidemia, type 2 diabetes mellitus, mood disorder on Lexapro have been noted.    He complains of right midback pain, right lower back sacroiliac region pain and right groin pain going to the testicle.  Years ago I would treat him with antibiotics but it is unclear if he had any testicular infection or epididymitis.  He felt extremely better with antibiotics.      He denies any burning sensation in the urine or noticing any blood in the urine.  About 8-10 years back I would done an ultrasound for his abdomen and also a CT scan which had shown small stones in the right kidney which were nonobstructing.  Probably 3 stones 1 millimeter or 3 millimeter size.    He also had gallbladder stone at that point which was eventually operated and removed upon.      As far as his diabetes is concerned, it seems to be stable.  We are waiting for the labs for those.  Her diabetes is currently on Janumet and Jardiance 10 mg.    Off late he has also been reporting hiccups.  He may also be feeling that certain foods get stuck.  He is not sure about the coordination with different type of foods and he will check on that.    For kidney protection he takes ramipril 1.25 mg. He is also on aspirin.    For sinus problems he takes Astelin nasal spray.        Past Medical History:   Diagnosis Date    Anxiety     Diabetes mellitus, type 2     History of colonoscopy 11/5/2020    Dr. Stephanie Morin.  Entire portion of the ileum was normal.  Nonbleeding internal hemorrhoids.  External examined colon is normal.  No specimens collected.  Repeat colonoscopy in 5 years for surveillance.     Social History     Socioeconomic History    Marital status:      Spouse name: Regla    Number of children:  4   Occupational History    Occupation: Self Employed- [employer]   Tobacco Use    Smoking status: Never    Smokeless tobacco: Former     Types: Chew     Quit date: 2000   Substance and Sexual Activity    Alcohol use: No    Drug use: No    Sexual activity: Yes     Partners: Female     Past Surgical History:   Procedure Laterality Date    COLONOSCOPY      x 2-3    LAPAROSCOPIC CHOLECYSTECTOMY N/A 5/10/2021    Procedure: CHOLECYSTECTOMY, LAPAROSCOPIC;  Surgeon: Gibran Lowe III, MD;  Location: [facility];  Service: General;  Laterality: N/A;     Family History   Problem Relation Age of Onset    Heart disease Mother     Diabetes Father        Review of Systems   Constitutional:  Positive for activity change. Negative for appetite change, chills, diaphoresis, fatigue, fever and unexpected weight change (gained 3 lbs..).   Respiratory:  Negative for cough, chest tightness and shortness of breath.    Cardiovascular:  Negative for chest pain and leg swelling.        Patient is on ACE inhibitor to protect his kidneys. He has normal blood pressure otherwise.   Gastrointestinal:  Positive for abdominal pain (Right groin pain and testicular pain.). Negative for abdominal distention, blood in stool, constipation and diarrhea.        SOME DIFFICULTY SWALLOWING FOOD AND HICCUPS.  HE WILL LOOK INTO THAT IN MORE DETAILS AND SEE HOW HE DOES.   Genitourinary:  Positive for flank pain and testicular pain. Negative for dysuria, frequency, hematuria and scrotal swelling.   Musculoskeletal:  Positive for back pain. Negative for arthralgias, gait problem and myalgias.        Right midback pain and right sacroiliac region pain.   Skin:  Negative for color change, pallor, rash and wound.   Neurological:  Negative for tremors, seizures, speech difficulty, weakness, light-headedness and headaches.   Hematological:  Negative for adenopathy. Does not bruise/bleed easily.   Psychiatric/Behavioral:  Positive for behavioral problems. Negative  "for dysphoric mood. The patient is not hyperactive.         CURRENTLY PATIENT IS ON LEXAPRO.  HE GENERALLY SEEMS TO BE CONTENT BUT HE SHOULD CHECK WITH HIS WIFE IF EVERYTHING IS OKAY AND HE IS NOT AS SNIPPY OR SNAPPY AS BEFORE.       Objective:      Blood pressure 107/69, pulse 96, height 5' 8" (1.727 m), weight 73.9 kg (163 lb). Body mass index is 24.78 kg/m².  Physical Exam  Vitals and nursing note reviewed.   Constitutional:       General: He is not in acute distress.     Appearance: He is well-developed.      Comments: BMI is 24.78   HENT:      Head: Normocephalic and atraumatic.   Eyes:      Conjunctiva/sclera: Conjunctivae normal.   Neck:      Thyroid: No thyromegaly.      Vascular: No JVD.      Trachea: No tracheal deviation.   Cardiovascular:      Rate and Rhythm: Regular rhythm.      Heart sounds: Normal heart sounds. No murmur heard.    No friction rub. No gallop.   Pulmonary:      Effort: Pulmonary effort is normal.      Breath sounds: Normal breath sounds. No stridor.   Abdominal:      General: There is no distension.      Palpations: Abdomen is soft.      Tenderness: There is no abdominal tenderness.   Genitourinary:     Penis: Uncircumcised.       Testes:         Right: Mass or tenderness not present.         Left: Mass or tenderness not present.      Epididymis:      Right: Not inflamed.      Left: Not inflamed.          Comments: Pain seems to be more on the right groin region.  I do not see any swelling the spermatic cord or vas deferens or testicles.  No mass in the testicles.  Musculoskeletal:         General: No tenderness or deformity.        Hands:       Cervical back: Neck supple.        Back:       Right lower leg: No edema.      Left lower leg: No edema.   Feet:      Left foot:      Skin integrity: No blister.   Skin:     General: Skin is warm and dry.      Findings: No lesion or rash.   Neurological:      Mental Status: He is alert and oriented to person, place, and time.      Deep Tendon " Reflexes: Reflexes are normal and symmetric.   Psychiatric:         Attention and Perception: Attention normal.         Speech: Speech normal.      Comments: NOT OVERTLY DEPRESSED BUT PERHAPS SLIGHTLY ANHEDONIC.         Assessment:               Rt flank pain  -     CT Abdomen Pelvis  Without Contrast; Future; Expected date: 04/27/2023    Right groin pain  -     Urinalysis; Future; Expected date: 04/26/2023    Nephrolithiasis  -     CT Abdomen Pelvis  Without Contrast; Future; Expected date: 04/27/2023  -     Urinalysis; Future; Expected date: 04/26/2023    Right lower quadrant abdominal pain  -     CT Abdomen Pelvis  Without Contrast; Future; Expected date: 04/27/2023  -     Urinalysis; Future; Expected date: 04/26/2023       No visits with results within 3 Month(s) from this visit.   Latest known visit with results is:   Office Visit on 01/05/2022   Component Date Value Ref Range Status    Cholesterol 06/28/2022 212 (H)  100 - 199 mg/dL Final    Triglycerides 06/28/2022 181 (H)  0 - 149 mg/dL Final    HDL 06/28/2022 38 (L)  >39 mg/dL Final    VLDL Cholesterol Blaze 06/28/2022 33  5 - 40 mg/dL Final    LDL Calculated 06/28/2022 141 (H)  0 - 99 mg/dL Final    Hemoglobin A1c 06/28/2022 9.2 (H)  4.8 - 5.6 % Final    ALT 06/28/2022 22  0 - 44 IU/L Final     HISTORY: R 10.9, K 80.20. FINDINGS: Comparison to the CT of 04/17/2014. The pancreas is obscured by overlying bowel gas. The liver has diffusely increased, coarsened parenchymal echotexture, and measures 15 cm craniocaudal length. There is no discrete hepatic mass or intrahepatic biliary ductal dilatation demonstrated. There is an echogenic shadowing gallstone lodged in the gallbladder neck measuring 7 mm, with no gallbladder wall thickening or pericholecystic fluid. The common duct measures 3 mm in diameter. The right kidney is normal in size and echotexture, with no echogenic calculi or hydronephrosis. Circumscribed anechoic simple right renal cyst measuring 11 mm  has increased through transmission of sound. The visualized abdominal aorta, IVC and main portal vein are normal. There is no ascites. IMPRESSION: 1. Cholelithiasis, with a 7 mm gallstone lodged in the gallbladder neck. No other findings of acute cholecystitis. 2. Diffuse hepatic steatosis. Read and electronically signed by: Can Mejia MD on 6/27/2018 7:26 AM CDT CAN MEJIA MD   CT scan abd pelvis 2014  Clinical data: 789.03, left lower quadrant pain FINDINGS: Pre- and postinfusion images were obtained from the lung bases to the pubic symphysis. The lung bases are normal. The liver has a normal appearance. There is a 7 mm calcified gallstone at the gallbladder neck. There is no CT evidence of cholecystitis or ruction. The spleen, pancreas, and adrenal glands are normal. The left kidney has a normal appearance. There is a 3 mm nonobstructing calculus at the midpole of the right kidney, with a small right upper pole cyst. The abdominal aorta is normal in caliber. Bowel structures are within normal limits, with the exception of a moderate amount of retained fecal matter in the colon. There is no pathologic bowel wall thickening or evidence of bowel obstruction. There is no free air or free fluid. Images of the pelvis demonstrate no mass or lymphadenopathy. Urinary bladder is within normal limits. There is a mild amount of retained fecal matter in the rectosigmoid colon, with no significant diverticular disease identified. The appendix is visualized and is normal. There is no free fluid. IMPRESSION: 1. No acute intra-abdominal or intrapelvic abnormalities are identified. 2. Moderate amount of retained fecal matter in the colon. 3. 3 mm nonobstructing right mid pole renal calculus. 16mm upper pole right renal cyst. 4. 7 mm gallstone at the gallbladder neck, with no CT evidence of cholecystitis or obstruction. Read and Electronically Signed by: BHAVIK CHICAS MD Date: 04/17/2014 11:17     Plan:           Rt  flank pain  -     CT Abdomen Pelvis  Without Contrast; Future; Expected date: 04/27/2023    Right groin pain  -     Urinalysis; Future; Expected date: 04/26/2023    Nephrolithiasis  -     CT Abdomen Pelvis  Without Contrast; Future; Expected date: 04/27/2023  -     Urinalysis; Future; Expected date: 04/26/2023    Right lower quadrant abdominal pain  -     CT Abdomen Pelvis  Without Contrast; Future; Expected date: 04/27/2023  -     Urinalysis; Future; Expected date: 04/26/2023      Patient's presentation with right groin pain, right lower quadrant pain, right midback pain has been noted.      I feel that this is all musculoskeletal pain related to spine, however I can not rule out the possibility of a kidney stone.      About 10 years back he had ultrasound of abdomen which had shown a kidney stone but it was small at that point.  3 mm perhaps.  I doubt that it has grown bigger and got trapped in his right groin.      No evidence of hernia.      I will get a CT scan kidney stone protocol and see what we find there.      He can continue with Aleve or Advil for or Advil for relief of pain.      Continue hydration.      Please note that he is on Jardiance which can increase the risk for dehydration and bladder infections.  He is aware of that.      Once the reports start coming in, I will notify the patient accordingly.      I will give him a follow-up perhaps in a month or 2 to review the situation and also for his medical issues.    Spent yamileth 30 minutes with patient which involved review of pts medical conditions, labs, medications and with 50% of time face-to-face discussion about medical problems, management and any applicable changes.        Current Outpatient Medications:     aspirin (ECOTRIN) 81 MG EC tablet, Take 1 tablet (81 mg total) by mouth Daily., Disp: 100 tablet, Rfl: 1    azelastine (ASTELIN) 137 mcg (0.1 %) nasal spray, SPRAY 1 SPAY IN EACH NOSTRIL TWICE DAILY, Disp: 30 mL, Rfl: 2    blood sugar  diagnostic Strp, 1 each by Other route., Disp: , Rfl:     empagliflozin (JARDIANCE) 25 mg tablet, Take 1 tablet (25 mg total) by mouth once daily., Disp: 90 tablet, Rfl: 1    EScitalopram oxalate (LEXAPRO) 10 MG tablet, , Disp: , Rfl:     linagliptin-metformin (JENTADUETO XR) 5-1,000 mg TBph, Take 1 tablet by mouth once daily., Disp: 90 each, Rfl: 1    mupirocin (BACTROBAN) 2 % ointment, Apply topically on the armpit at nighttime., Disp: 22 g, Rfl: 0    ramipriL (ALTACE) 1.25 MG capsule, TAKE 1 CAPSULE(1.25 MG) BY MOUTH EVERY DAY, Disp: 90 capsule, Rfl: 1

## 2023-04-27 LAB
ALT SERPL-CCNC: 20 IU/L (ref 0–44)
APPEARANCE UR: CLEAR
BILIRUB UR QL STRIP: NEGATIVE
CHOLEST SERPL-MCNC: 190 MG/DL (ref 100–199)
COLOR UR: YELLOW
GLUCOSE UR QL STRIP: ABNORMAL
HBA1C MFR BLD: 8.8 % (ref 4.8–5.6)
HDLC SERPL-MCNC: 35 MG/DL
HGB UR QL STRIP: NEGATIVE
KETONES UR QL STRIP: NEGATIVE
LDLC SERPL CALC-MCNC: 129 MG/DL (ref 0–99)
LEUKOCYTE ESTERASE UR QL STRIP: NEGATIVE
MICRO URNS: ABNORMAL
NITRITE UR QL STRIP: NEGATIVE
PH UR STRIP: 5.5 [PH] (ref 5–7.5)
PROT UR QL STRIP: NEGATIVE
SP GR UR STRIP: >=1.03 (ref 1–1.03)
TRIGL SERPL-MCNC: 142 MG/DL (ref 0–149)
UROBILINOGEN UR STRIP-MCNC: 0.2 MG/DL (ref 0.2–1)
VLDLC SERPL CALC-MCNC: 26 MG/DL (ref 5–40)

## 2023-04-28 ENCOUNTER — HOSPITAL ENCOUNTER (OUTPATIENT)
Dept: RADIOLOGY | Facility: HOSPITAL | Age: 58
Discharge: HOME | End: 2023-04-28
Attending: INTERNAL MEDICINE
Payer: COMMERCIAL

## 2023-04-28 DIAGNOSIS — R10.31 RIGHT LOWER QUADRANT ABDOMINAL PAIN: ICD-10-CM

## 2023-04-28 DIAGNOSIS — R10.9 RT FLANK PAIN: ICD-10-CM

## 2023-04-28 DIAGNOSIS — N20.0 NEPHROLITHIASIS: ICD-10-CM

## 2023-04-28 PROCEDURE — 74176 CT ABD & PELVIS W/O CONTRAST: CPT | Mod: TC,PO

## 2023-04-30 ENCOUNTER — PATIENT MESSAGE (OUTPATIENT)
Dept: FAMILY MEDICINE | Facility: CLINIC | Age: 58
End: 2023-04-30

## 2023-04-30 NOTE — PROGRESS NOTES
The results of your CT scan of abdomen is back.  It still shows a 4 mm stone in the right side which is not blocking.  Your gallbladder was previously removed.  No other major findings to explain your pain.  How is your pain doing?

## 2023-05-01 ENCOUNTER — TELEPHONE (OUTPATIENT)
Dept: FAMILY MEDICINE | Facility: CLINIC | Age: 58
End: 2023-05-01

## 2023-05-01 NOTE — TELEPHONE ENCOUNTER
Spoke to patient about CT scan results which shows a nonobstructive calculus in the right kidney.  This should not be explaining his right lower groin pain and low back include does have a follow-up on August 8th at 8:40 a.m. I will continue the discussion and see what else we need to do.

## 2023-05-07 NOTE — PROGRESS NOTES
Subjective:       Patient ID: Pedro Luis Pérez is a 57 y.o. male.    Chief Complaint: Headache, Sore Throat, URI, Fever, and Chills    Pt is a 58 YO male who comes for follow up.  Actually his follow-up was for his right flank pain but today he has been feeling sick with some fever and chills for the last couple of days.  His wife was diagnosed sometimes last week with COVID 19 and is being treated accordingly.  Patient has started his symptoms for the last 2 days.    Medical problems as below:-    1.-type 2 diabetes mellitus on multiple medications in past  2.-history of arthritis and trigger finger.  3.-hyperlipidemia currently not on any medications.            Fever   This is a new problem. The current episode started in the past 7 days. The problem occurs intermittently. His temperature was unmeasured prior to arrival. Associated symptoms include congestion, coughing, muscle aches and a sore throat. Pertinent negatives include no abdominal pain, chest pain, ear pain, rash or wheezing. He has tried acetaminophen for the symptoms. The treatment provided moderate relief.   Risk factors: sick contacts    Risk factors comment:  Wife diagnosed with COVID    Past Medical History:   Diagnosis Date    Anxiety     Diabetes mellitus, type 2     History of colonoscopy 11/5/2020    Dr. Stephanie Morin.  Entire portion of the ileum was normal.  Nonbleeding internal hemorrhoids.  External examined colon is normal.  No specimens collected.  Repeat colonoscopy in 5 years for surveillance.     Social History     Socioeconomic History    Marital status:      Spouse name: Regla    Number of children: 4   Occupational History    Occupation: Self Employed- [employer]   Tobacco Use    Smoking status: Never    Smokeless tobacco: Former     Types: Chew     Quit date: 2000   Substance and Sexual Activity    Alcohol use: No    Drug use: No    Sexual activity: Yes     Partners: Female     Past Surgical History:   Procedure  "Laterality Date    COLONOSCOPY      x 2-3    LAPAROSCOPIC CHOLECYSTECTOMY N/A 5/10/2021    Procedure: CHOLECYSTECTOMY, LAPAROSCOPIC;  Surgeon: Gibran Lowe III, MD;  Location: [facility];  Service: General;  Laterality: N/A;     Family History   Problem Relation Age of Onset    Heart disease Mother     Diabetes Father        Review of Systems   Constitutional:  Positive for activity change, appetite change, chills, fatigue and fever.   HENT:  Positive for congestion and sore throat. Negative for ear pain.    Respiratory:  Positive for cough. Negative for chest tightness, wheezing and stridor.    Cardiovascular:  Negative for chest pain, palpitations and leg swelling.   Gastrointestinal:  Negative for abdominal distention and abdominal pain.   Endocrine: Negative for polyuria.        Underlying diabetes mellitus   Genitourinary:  Negative for difficulty urinating.   Skin:  Negative for rash.       Objective:      Blood pressure 113/63, pulse 97, temperature 99.6 °F (37.6 °C), height 5' 8" (1.727 m), weight 71.2 kg (157 lb). Body mass index is 23.87 kg/m².  Physical Exam  Constitutional:       Appearance: He is ill-appearing. He is not diaphoretic.   HENT:      Nose: Congestion present.      Mouth/Throat:      Pharynx: No oropharyngeal exudate or posterior oropharyngeal erythema.   Eyes:      General: No scleral icterus.  Cardiovascular:      Rate and Rhythm: Normal rate and regular rhythm.      Pulses: Normal pulses.      Heart sounds: Normal heart sounds.   Pulmonary:      Effort: No respiratory distress.      Breath sounds: Normal breath sounds. No stridor.   Abdominal:      General: There is no distension.      Tenderness: There is no abdominal tenderness.   Musculoskeletal:      Cervical back: No rigidity or tenderness.      Right lower leg: No edema.      Left lower leg: No edema.   Lymphadenopathy:      Cervical: No cervical adenopathy.   Skin:     Findings: No rash.   Psychiatric:         Behavior: Behavior " normal.         Assessment:               COVID-19  -     POCT COVID-19 Rapid Screening  -     nirmatrelvir-ritonavir 300 mg (150 mg x 2)-100 mg copackaged tablets (EUA); Take 3 tablets by mouth 2 (two) times daily for 5 days. Each dose contains 2 nirmatrelvir (pink tablets) and 1 ritonavir (white tablet). Take all 3 tablets together  Dispense: 30 tablet; Refill: 0    Cough, unspecified type  -     promethazine-dextromethorphan (PROMETHAZINE-DM) 6.25-15 mg/5 mL Syrp; Take 5 mLs by mouth every 8 (eight) hours as needed (cough).  Dispense: 120 mL; Refill: 0       Office Visit on 04/26/2023   Component Date Value Ref Range Status    Specific Gravity, UA 04/26/2023 >=1.030 (A)  1.005 - 1.030 Final    pH, UA 04/26/2023 5.5  5.0 - 7.5 Final    Color, UA 04/26/2023 Yellow  Yellow Final    Clarity, UA 04/26/2023 Clear  Clear Final    Leukocytes, UA 04/26/2023 Negative  Negative Final    Protein, UA 04/26/2023 Negative  Negative/Trace Final    Glucose, UA 04/26/2023 3+ (A)  Negative Final    Ketones, UA 04/26/2023 Negative  Negative Final    Occult Blood UA 04/26/2023 Negative  Negative Final    Bilirubin, UA 04/26/2023 Negative  Negative Final    Urobilinogen, UA 04/26/2023 0.2  0.2 - 1.0 mg/dL Final    Nitrite, UA 04/26/2023 Negative  Negative Final    Microscopic Examination 04/26/2023 Comment   Final       HISTORY: Flank pain, right groin pain     FINDINGS: Noncontrast axial images were obtained. Oral contrast was administered. Nonenhanced study is tailored for the detection of urolithiasis, and is insensitive for abnormalities of the solid organs, vasculature and hollow viscera.     CT ABDOMEN: The lung bases are clear.     The liver, spleen and pancreas have a normal noncontrast appearance.  Gallbladder is absent.     Adrenal glands are normal  Kidneys are symmetric in size without hydronephrosis. There is a 4 mm nonobstructing right renal stone.     There are no thick-walled or dilated bowel loops. There is no  mesenteric or retroperitoneal adenopathy. The aorta is normal.  The musculature is normal. There are no acute osseous abnormalities.  CT PELVIS: The bladder and prostate gland are normal. There is no pelvic adenopathy. There are no acute osseous abnormalities.     IMPRESSION: 4 mm nonobstructing right renal stone  Prior cholecystectomy     Electronically signed by:  Tata Garcia MD  4/28/2023 8:34 AM CDT Workstation: [id number]           Specimen Collected: 04/28/23 07:43 Last Resulted: 04/28/23 08:34              Plan:           COVID-19  -     POCT COVID-19 Rapid Screening  -     nirmatrelvir-ritonavir 300 mg (150 mg x 2)-100 mg copackaged tablets (EUA); Take 3 tablets by mouth 2 (two) times daily for 5 days. Each dose contains 2 nirmatrelvir (pink tablets) and 1 ritonavir (white tablet). Take all 3 tablets together  Dispense: 30 tablet; Refill: 0    Cough, unspecified type  -     promethazine-dextromethorphan (PROMETHAZINE-DM) 6.25-15 mg/5 mL Syrp; Take 5 mLs by mouth every 8 (eight) hours as needed (cough).  Dispense: 120 mL; Refill: 0    Patient is COVID positive with known exposure, cough, fever and upper respiratory symptoms.  Information concerning COVID given.  Salt water gargles and steam inhalation.  Keep hydrated.  Tylenol for fever.  Promethazine DM for cough and do not drive on this medication.  Prescription for Paxlovid given.  Keep us updated.      In case worsening of symptoms, please go to the emergency room.    Diabetes follow-up once better.        Current Outpatient Medications:     aspirin (ECOTRIN) 81 MG EC tablet, Take 1 tablet (81 mg total) by mouth Daily., Disp: 100 tablet, Rfl: 1    azelastine (ASTELIN) 137 mcg (0.1 %) nasal spray, SPRAY 1 SPAY IN EACH NOSTRIL TWICE DAILY, Disp: 30 mL, Rfl: 2    blood sugar diagnostic Strp, 1 each by Other route., Disp: , Rfl:     empagliflozin (JARDIANCE) 25 mg tablet, Take 1 tablet (25 mg total) by mouth once daily., Disp: 90 tablet, Rfl: 1     EScitalopram oxalate (LEXAPRO) 10 MG tablet, , Disp: , Rfl:     linagliptin-metformin (JENTADUETO XR) 5-1,000 mg TBph, Take 1 tablet by mouth once daily., Disp: 90 each, Rfl: 1    mupirocin (BACTROBAN) 2 % ointment, Apply topically on the armpit at nighttime., Disp: 22 g, Rfl: 0    ramipriL (ALTACE) 1.25 MG capsule, TAKE 1 CAPSULE(1.25 MG) BY MOUTH EVERY DAY, Disp: 90 capsule, Rfl: 1    nirmatrelvir-ritonavir 300 mg (150 mg x 2)-100 mg copackaged tablets (EUA), Take 3 tablets by mouth 2 (two) times daily for 5 days. Each dose contains 2 nirmatrelvir (pink tablets) and 1 ritonavir (white tablet). Take all 3 tablets together, Disp: 30 tablet, Rfl: 0    promethazine-dextromethorphan (PROMETHAZINE-DM) 6.25-15 mg/5 mL Syrp, Take 5 mLs by mouth every 8 (eight) hours as needed (cough)., Disp: 120 mL, Rfl: 0

## 2023-05-08 ENCOUNTER — OFFICE VISIT (OUTPATIENT)
Dept: FAMILY MEDICINE | Facility: CLINIC | Age: 58
End: 2023-05-08
Payer: COMMERCIAL

## 2023-05-08 VITALS
BODY MASS INDEX: 23.79 KG/M2 | SYSTOLIC BLOOD PRESSURE: 113 MMHG | TEMPERATURE: 100 F | WEIGHT: 157 LBS | HEART RATE: 97 BPM | DIASTOLIC BLOOD PRESSURE: 63 MMHG | HEIGHT: 68 IN

## 2023-05-08 DIAGNOSIS — U07.1 COVID-19 VIRUS DETECTED: ICD-10-CM

## 2023-05-08 DIAGNOSIS — R05.9 COUGH, UNSPECIFIED TYPE: ICD-10-CM

## 2023-05-08 DIAGNOSIS — E11.9 TYPE 2 DIABETES MELLITUS WITHOUT COMPLICATION, WITHOUT LONG-TERM CURRENT USE OF INSULIN: Chronic | ICD-10-CM

## 2023-05-08 DIAGNOSIS — U07.1 COVID-19: Primary | ICD-10-CM

## 2023-05-08 LAB
CTP QC/QA: YES
SARS-COV-2 RDRP RESP QL NAA+PROBE: POSITIVE

## 2023-05-08 PROCEDURE — 99212 PR OFFICE/OUTPT VISIT, EST, LEVL II, 10-19 MIN: ICD-10-PCS | Mod: S$GLB,,, | Performed by: INTERNAL MEDICINE

## 2023-05-08 PROCEDURE — 87635: ICD-10-PCS | Mod: QW,S$GLB,, | Performed by: INTERNAL MEDICINE

## 2023-05-08 PROCEDURE — 3008F BODY MASS INDEX DOCD: CPT | Mod: CPTII,S$GLB,, | Performed by: INTERNAL MEDICINE

## 2023-05-08 PROCEDURE — 4010F PR ACE/ARB THEARPY RXD/TAKEN: ICD-10-PCS | Mod: CPTII,S$GLB,, | Performed by: INTERNAL MEDICINE

## 2023-05-08 PROCEDURE — 3074F PR MOST RECENT SYSTOLIC BLOOD PRESSURE < 130 MM HG: ICD-10-PCS | Mod: CPTII,S$GLB,, | Performed by: INTERNAL MEDICINE

## 2023-05-08 PROCEDURE — 1159F MED LIST DOCD IN RCRD: CPT | Mod: CPTII,S$GLB,, | Performed by: INTERNAL MEDICINE

## 2023-05-08 PROCEDURE — 1159F PR MEDICATION LIST DOCUMENTED IN MEDICAL RECORD: ICD-10-PCS | Mod: CPTII,S$GLB,, | Performed by: INTERNAL MEDICINE

## 2023-05-08 PROCEDURE — 3078F PR MOST RECENT DIASTOLIC BLOOD PRESSURE < 80 MM HG: ICD-10-PCS | Mod: CPTII,S$GLB,, | Performed by: INTERNAL MEDICINE

## 2023-05-08 PROCEDURE — 3008F PR BODY MASS INDEX (BMI) DOCUMENTED: ICD-10-PCS | Mod: CPTII,S$GLB,, | Performed by: INTERNAL MEDICINE

## 2023-05-08 PROCEDURE — 3078F DIAST BP <80 MM HG: CPT | Mod: CPTII,S$GLB,, | Performed by: INTERNAL MEDICINE

## 2023-05-08 PROCEDURE — 87635 SARS-COV-2 COVID-19 AMP PRB: CPT | Mod: QW,S$GLB,, | Performed by: INTERNAL MEDICINE

## 2023-05-08 PROCEDURE — 1160F PR REVIEW ALL MEDS BY PRESCRIBER/CLIN PHARMACIST DOCUMENTED: ICD-10-PCS | Mod: CPTII,S$GLB,, | Performed by: INTERNAL MEDICINE

## 2023-05-08 PROCEDURE — 3074F SYST BP LT 130 MM HG: CPT | Mod: CPTII,S$GLB,, | Performed by: INTERNAL MEDICINE

## 2023-05-08 PROCEDURE — 3052F HG A1C>EQUAL 8.0%<EQUAL 9.0%: CPT | Mod: CPTII,S$GLB,, | Performed by: INTERNAL MEDICINE

## 2023-05-08 PROCEDURE — 3052F PR MOST RECENT HEMOGLOBIN A1C LEVEL 8.0 - < 9.0%: ICD-10-PCS | Mod: CPTII,S$GLB,, | Performed by: INTERNAL MEDICINE

## 2023-05-08 PROCEDURE — 99212 OFFICE O/P EST SF 10 MIN: CPT | Mod: S$GLB,,, | Performed by: INTERNAL MEDICINE

## 2023-05-08 PROCEDURE — 1160F RVW MEDS BY RX/DR IN RCRD: CPT | Mod: CPTII,S$GLB,, | Performed by: INTERNAL MEDICINE

## 2023-05-08 PROCEDURE — 4010F ACE/ARB THERAPY RXD/TAKEN: CPT | Mod: CPTII,S$GLB,, | Performed by: INTERNAL MEDICINE

## 2023-05-08 RX ORDER — PROMETHAZINE HYDROCHLORIDE AND DEXTROMETHORPHAN HYDROBROMIDE 6.25; 15 MG/5ML; MG/5ML
5 SYRUP ORAL EVERY 8 HOURS PRN
Qty: 120 ML | Refills: 0 | Status: SHIPPED | OUTPATIENT
Start: 2023-05-08 | End: 2023-05-18

## 2023-06-12 NOTE — PROGRESS NOTES
Subjective:       Patient ID: Pedro Luis Pérez is a 57 y.o. male.    Chief Complaint: Diabetes, Hypertension, Hyperlipidemia, and Mood Disorder    Mr. Jeramie Pérez is a 57-year-old gentleman who comes for follow-up.    Previous visit he came for upper respiratory symptoms and was diagnosed to have COVID.  He was treated accordingly.  He has improved from the COVID symptoms.  His wife also had COVID at that time and probably she got it 1st from her [employer].    His sugars are not doing too bad.  He is tending to watch his diet but he would like to get a nutrition consultation as to proper diet manage his diabetes.    At that time he also had some flank pain the differential diagnosis was multiple including kidney stone.    Underlying medical issues include the following-    1.-type 2 diabetes mellitus-  2.-hyperlipidemia   3.-mood disorder  4.-recent diagnosis of COVID-19 treated with Paxlovid.    5.-recent diagnosis of right groin and flank pain  6.-recent diagnosis of kidney stone nonobstructing based upon a CT scan.  Presented with right groin pain.    Labs done in the month of April had shown hemoglobin A1c of 8.8, ALT of 20, LDL of 129, total cholesterol of 190 and triglycerides of 142.   Previously hemoglobin A1c was 9.2.  Current medications for diabetes include Jardiance 25 mg and Jentadueto.    For kidney protection he takes ramipril 1.25 mg. He is also on aspirin.    For sinus problems he takes Astelin nasal spray.      Due to uncontrolled nature of his diabetes now need to consider probably GLP 1 agonist like Ozempic.    Hyperlipidemia  This is a chronic problem. The problem is uncontrolled. Pertinent negatives include no shortness of breath. Current antihyperlipidemic treatment includes statins. The current treatment provides mild improvement of lipids. Risk factors for coronary artery disease include male sex, a sedentary lifestyle, dyslipidemia and diabetes mellitus.   Diabetes  He presents for his  follow-up diabetic visit. He has type 2 diabetes mellitus. The initial diagnosis of diabetes was made 10 years ago. His disease course has been worsening (HEMOGLOBIN A1C HAS COME GONE GREATER THAN 9. ). Pertinent negatives for hypoglycemia include no confusion, nervousness/anxiousness, pallor, seizures, speech difficulty or tremors. Pertinent negatives for diabetes include no polydipsia, no polyphagia and no polyuria. There are no hypoglycemic complications. There are no diabetic complications. Risk factors for coronary artery disease include diabetes mellitus, dyslipidemia, male sex and sedentary lifestyle. Current diabetic treatment includes oral agent (triple therapy). He is compliant with treatment some of the time. When asked about meal planning, he reported none. He has not had a previous visit with a dietitian. He rarely participates in exercise. An ACE inhibitor/angiotensin II receptor blocker is being taken. He does not see a podiatrist.  Sinus Problem  This is a chronic problem. The current episode started more than 1 year ago. The problem is unchanged. Pertinent negatives include no ear pain, shortness of breath or sneezing. Treatments tried: Astelin nasal spray. The treatment provided moderate relief.   Anxiety  Presents for follow-up (PATIENT IS CURRENTLY ON LEXAPRO.) visit. Symptoms include compulsions, excessive worry, irritability and malaise. Patient reports no confusion, nervous/anxious behavior, palpitations or shortness of breath. Symptoms occur occasionally. The quality of sleep is fair.         Past Medical History:   Diagnosis Date    Anxiety     Diabetes mellitus, type 2     History of colonoscopy 11/5/2020    Dr. Stephanie Morin.  Entire portion of the ileum was normal.  Nonbleeding internal hemorrhoids.  External examined colon is normal.  No specimens collected.  Repeat colonoscopy in 5 years for surveillance.     Social History     Socioeconomic History    Marital status:       Spouse name: Regla    Number of children: 4   Occupational History    Occupation: Self Employed- [employer]   Tobacco Use    Smoking status: Never    Smokeless tobacco: Former     Types: Chew     Quit date: 2000   Substance and Sexual Activity    Alcohol use: No    Drug use: No    Sexual activity: Yes     Partners: Female     Social Determinants of Health     Financial Resource Strain: Low Risk     Difficulty of Paying Living Expenses: Not very hard   Food Insecurity: No Food Insecurity    Worried About Running Out of Food in the Last Year: Never true    Ran Out of Food in the Last Year: Never true   Transportation Needs: No Transportation Needs    Lack of Transportation (Medical): No    Lack of Transportation (Non-Medical): No   Physical Activity: Inactive    Days of Exercise per Week: 0 days    Minutes of Exercise per Session: 0 min   Stress: Stress Concern Present    Feeling of Stress : To some extent   Social Connections: Moderately Integrated    Frequency of Communication with Friends and Family: Three times a week    Frequency of Social Gatherings with Friends and Family: Three times a week    Attends Yazidi Services: 1 to 4 times per year    Active Member of Clubs or Organizations: No    Attends Club or Organization Meetings: Never    Marital Status:    Housing Stability: Low Risk     Unable to Pay for Housing in the Last Year: No    Number of Places Lived in the Last Year: 1    Unstable Housing in the Last Year: No     Past Surgical History:   Procedure Laterality Date    COLONOSCOPY      x 2-3    LAPAROSCOPIC CHOLECYSTECTOMY N/A 5/10/2021    Procedure: CHOLECYSTECTOMY, LAPAROSCOPIC;  Surgeon: Gibran Lowe III, MD;  Location: [facility];  Service: General;  Laterality: N/A;     Family History   Problem Relation Age of Onset    Heart disease Mother     Diabetes Father        Review of Systems   Constitutional:  Positive for irritability. Negative for activity change and appetite change.   HENT:  " Negative for ear pain and sneezing.    Respiratory:  Negative for chest tightness, shortness of breath, wheezing and stridor.    Cardiovascular:  Negative for palpitations and leg swelling.   Gastrointestinal:  Negative for abdominal distention.   Endocrine: Negative for polydipsia, polyphagia and polyuria.        Underlying diabetes mellitus   Genitourinary:  Negative for difficulty urinating.   Skin:  Negative for pallor.   Neurological:  Negative for tremors, seizures and speech difficulty.   Psychiatric/Behavioral:  Negative for confusion. The patient is not nervous/anxious.        Objective:      Blood pressure 100/64, pulse 71, height 5' 8" (1.727 m), weight 72.6 kg (160 lb). Body mass index is 24.33 kg/m².  Physical Exam  Vitals and nursing note reviewed.   Constitutional:       General: He is not in acute distress.     Appearance: He is well-developed.      Comments: BMI is 24.33   HENT:      Head: Normocephalic and atraumatic.   Eyes:      Conjunctiva/sclera: Conjunctivae normal.   Neck:      Thyroid: No thyromegaly.      Vascular: No JVD.      Trachea: No tracheal deviation.   Cardiovascular:      Rate and Rhythm: Regular rhythm.      Heart sounds: Normal heart sounds. No murmur heard.    No friction rub. No gallop.   Pulmonary:      Effort: Pulmonary effort is normal.      Breath sounds: Normal breath sounds. No stridor.   Abdominal:      General: There is no distension.      Palpations: Abdomen is soft.      Tenderness: There is no abdominal tenderness.   Genitourinary:     Penis: Uncircumcised.       Testes:         Right: Mass or tenderness not present.         Left: Mass or tenderness not present.      Epididymis:      Right: Not inflamed.      Left: Not inflamed.          Comments: Pain seems to be more on the right groin region.  I do not see any swelling the spermatic cord or vas deferens or testicles.  No mass in the testicles.  Musculoskeletal:         General: No tenderness or deformity.      " Cervical back: Neck supple.      Right lower leg: No edema.      Left lower leg: No edema.   Feet:      Right foot:      Protective Sensation: 5 sites tested.  5 sites sensed.      Left foot:      Protective Sensation: 5 sites tested.  5 sites sensed.      Skin integrity: No blister.   Lymphadenopathy:      Cervical: No cervical adenopathy.   Skin:     General: Skin is warm and dry.      Findings: No lesion or rash.   Neurological:      Mental Status: He is alert and oriented to person, place, and time.   Psychiatric:         Attention and Perception: Attention normal.         Speech: Speech normal.      Comments: NOT OVERTLY DEPRESSED BUT PERHAPS SLIGHTLY ANHEDONIC.         Assessment:               Component Ref Range & Units 1 mo ago  (4/26/23) 11 mo ago  (6/28/22) 1 yr ago  (1/4/22) 2 yr ago  (11/5/20) 3 yr ago  (9/6/19) 4 yr ago  (4/24/19) 4 yr ago  (10/22/18)   Hemoglobin A1c 4.8 - 5.6 % 8.8 High   9.2 High  CM  8.3 High  CM  8.6 High  CM  7.8 High  CM  7.3 High  CM  6.4 High      Office Visit on 05/08/2023   Component Date Value Ref Range Status    POC Rapid COVID 05/08/2023 Positive (A)  Negative Final     Acceptable 05/08/2023 Yes   Final   Office Visit on 04/26/2023   Component Date Value Ref Range Status    Specific Gravity, UA 04/26/2023 >=1.030 (A)  1.005 - 1.030 Final    pH, UA 04/26/2023 5.5  5.0 - 7.5 Final    Color, UA 04/26/2023 Yellow  Yellow Final    Clarity, UA 04/26/2023 Clear  Clear Final    Leukocytes, UA 04/26/2023 Negative  Negative Final    Protein, UA 04/26/2023 Negative  Negative/Trace Final    Glucose, UA 04/26/2023 3+ (A)  Negative Final    Ketones, UA 04/26/2023 Negative  Negative Final    Occult Blood UA 04/26/2023 Negative  Negative Final    Bilirubin, UA 04/26/2023 Negative  Negative Final    Urobilinogen, UA 04/26/2023 0.2  0.2 - 1.0 mg/dL Final    Nitrite, UA 04/26/2023 Negative  Negative Final    Microscopic Examination 04/26/2023 Comment   Final     CT ABDOMEN:  The lung bases are clear.     The liver, spleen and pancreas have a normal noncontrast appearance.  Gallbladder is absent.     Adrenal glands are normal  Kidneys are symmetric in size without hydronephrosis. There is a 4 mm nonobstructing right renal stone.     There are no thick-walled or dilated bowel loops. There is no mesenteric or retroperitoneal adenopathy. The aorta is normal.  The musculature is normal. There are no acute osseous abnormalities.  CT PELVIS: The bladder and prostate gland are normal. There is no pelvic adenopathy. There are no acute osseous abnormalities.     IMPRESSION: 4 mm nonobstructing right renal stone  Prior cholecystectomy     Electronically signed by:  Tata Garcia MD  4/28/2023 8:34 AM CDT Workstation: [id number]           Specimen Collected: 04/28/23 07:43 Last Resulted: 04/28/23 08:34              Type 2 diabetes mellitus without complication, without long-term current use of insulin  -     Ambulatory referral/consult to Nutrition Services; Future; Expected date: 06/29/2023    Nephrolithiasis    Dyslipidemia  -     Ambulatory referral/consult to Nutrition Services; Future; Expected date: 06/29/2023    Mood disorder       Plan:           Type 2 diabetes mellitus without complication, without long-term current use of insulin  -     Ambulatory referral/consult to Nutrition Services; Future; Expected date: 06/29/2023    Nephrolithiasis    Dyslipidemia  -     Ambulatory referral/consult to Nutrition Services; Future; Expected date: 06/29/2023    Mood disorder    Overall Jeramie seems to be doing okay.      He has recovered from the COVID within a few days off the diagnosis during last visit.  He had got it from his wife who probably got it from her [employer].      He continues on Lexapro which keeps his mood and happiness intact.  It is as per the insistence of his wife.      Blood sugars are fairly stable and I would like to check a hemoglobin A1c to be sure where we are.  He does  believe that he may benefit from a nutritionist consult and I agree with that.      Sinuses are doing okay and he occasionally or rarely uses Astelin nasal spray especially when it flares up.      Please note that ramipril or Altace is to protect his kidneys and not necessarily for blood pressure.    He does have kidney stones which are stable at this point.      Currently he is on a combination of Jardiance 25 mg and gentle do a toe which is a combination of Januvia and metformin.    He is made aware that Jardiance can increase the risk of urine infections and he should clean his genital areas in private areas at least twice a day.  Each time he passes urine he should be sure that there is no droplet of urine remaining around his genitals.    Also discussed about pneumonia vaccine        Current Outpatient Medications:     aspirin (ECOTRIN) 81 MG EC tablet, Take 1 tablet (81 mg total) by mouth Daily., Disp: 100 tablet, Rfl: 1    azelastine (ASTELIN) 137 mcg (0.1 %) nasal spray, SPRAY 1 SPAY IN EACH NOSTRIL TWICE DAILY, Disp: 30 mL, Rfl: 2    blood sugar diagnostic Strp, 1 each by Other route., Disp: , Rfl:     empagliflozin (JARDIANCE) 25 mg tablet, Take 1 tablet (25 mg total) by mouth once daily., Disp: 90 tablet, Rfl: 1    EScitalopram oxalate (LEXAPRO) 10 MG tablet, , Disp: , Rfl:     linagliptin-metformin (JENTADUETO XR) 5-1,000 mg TBph, Take 1 tablet by mouth once daily., Disp: 90 each, Rfl: 1    mupirocin (BACTROBAN) 2 % ointment, Apply topically on the armpit at nighttime., Disp: 22 g, Rfl: 0    ramipriL (ALTACE) 1.25 MG capsule, TAKE 1 CAPSULE(1.25 MG) BY MOUTH EVERY DAY, Disp: 90 capsule, Rfl: 1

## 2023-06-15 ENCOUNTER — OFFICE VISIT (OUTPATIENT)
Dept: FAMILY MEDICINE | Facility: CLINIC | Age: 58
End: 2023-06-15
Payer: COMMERCIAL

## 2023-06-15 VITALS
HEIGHT: 68 IN | BODY MASS INDEX: 24.25 KG/M2 | DIASTOLIC BLOOD PRESSURE: 64 MMHG | SYSTOLIC BLOOD PRESSURE: 100 MMHG | WEIGHT: 160 LBS | HEART RATE: 71 BPM

## 2023-06-15 DIAGNOSIS — E11.9 TYPE 2 DIABETES MELLITUS WITHOUT COMPLICATION, WITHOUT LONG-TERM CURRENT USE OF INSULIN: Primary | ICD-10-CM

## 2023-06-15 DIAGNOSIS — E78.5 DYSLIPIDEMIA: ICD-10-CM

## 2023-06-15 DIAGNOSIS — N20.0 NEPHROLITHIASIS: ICD-10-CM

## 2023-06-15 DIAGNOSIS — Z12.5 SCREENING FOR PROSTATE CANCER: ICD-10-CM

## 2023-06-15 DIAGNOSIS — F39 MOOD DISORDER: ICD-10-CM

## 2023-06-15 PROCEDURE — 1160F RVW MEDS BY RX/DR IN RCRD: CPT | Mod: CPTII,S$GLB,, | Performed by: INTERNAL MEDICINE

## 2023-06-15 PROCEDURE — 4010F PR ACE/ARB THEARPY RXD/TAKEN: ICD-10-PCS | Mod: CPTII,S$GLB,, | Performed by: INTERNAL MEDICINE

## 2023-06-15 PROCEDURE — 3052F HG A1C>EQUAL 8.0%<EQUAL 9.0%: CPT | Mod: CPTII,S$GLB,, | Performed by: INTERNAL MEDICINE

## 2023-06-15 PROCEDURE — 3052F PR MOST RECENT HEMOGLOBIN A1C LEVEL 8.0 - < 9.0%: ICD-10-PCS | Mod: CPTII,S$GLB,, | Performed by: INTERNAL MEDICINE

## 2023-06-15 PROCEDURE — 99214 OFFICE O/P EST MOD 30 MIN: CPT | Mod: S$GLB,,, | Performed by: INTERNAL MEDICINE

## 2023-06-15 PROCEDURE — 3078F DIAST BP <80 MM HG: CPT | Mod: CPTII,S$GLB,, | Performed by: INTERNAL MEDICINE

## 2023-06-15 PROCEDURE — 3074F SYST BP LT 130 MM HG: CPT | Mod: CPTII,S$GLB,, | Performed by: INTERNAL MEDICINE

## 2023-06-15 PROCEDURE — 1159F MED LIST DOCD IN RCRD: CPT | Mod: CPTII,S$GLB,, | Performed by: INTERNAL MEDICINE

## 2023-06-15 PROCEDURE — 1159F PR MEDICATION LIST DOCUMENTED IN MEDICAL RECORD: ICD-10-PCS | Mod: CPTII,S$GLB,, | Performed by: INTERNAL MEDICINE

## 2023-06-15 PROCEDURE — 3078F PR MOST RECENT DIASTOLIC BLOOD PRESSURE < 80 MM HG: ICD-10-PCS | Mod: CPTII,S$GLB,, | Performed by: INTERNAL MEDICINE

## 2023-06-15 PROCEDURE — 1160F PR REVIEW ALL MEDS BY PRESCRIBER/CLIN PHARMACIST DOCUMENTED: ICD-10-PCS | Mod: CPTII,S$GLB,, | Performed by: INTERNAL MEDICINE

## 2023-06-15 PROCEDURE — 3008F BODY MASS INDEX DOCD: CPT | Mod: CPTII,S$GLB,, | Performed by: INTERNAL MEDICINE

## 2023-06-15 PROCEDURE — 3074F PR MOST RECENT SYSTOLIC BLOOD PRESSURE < 130 MM HG: ICD-10-PCS | Mod: CPTII,S$GLB,, | Performed by: INTERNAL MEDICINE

## 2023-06-15 PROCEDURE — 3008F PR BODY MASS INDEX (BMI) DOCUMENTED: ICD-10-PCS | Mod: CPTII,S$GLB,, | Performed by: INTERNAL MEDICINE

## 2023-06-15 PROCEDURE — 4010F ACE/ARB THERAPY RXD/TAKEN: CPT | Mod: CPTII,S$GLB,, | Performed by: INTERNAL MEDICINE

## 2023-06-15 PROCEDURE — 99214 PR OFFICE/OUTPT VISIT, EST, LEVL IV, 30-39 MIN: ICD-10-PCS | Mod: S$GLB,,, | Performed by: INTERNAL MEDICINE

## 2023-06-16 ENCOUNTER — PATIENT MESSAGE (OUTPATIENT)
Dept: FAMILY MEDICINE | Facility: CLINIC | Age: 58
End: 2023-06-16

## 2023-07-05 ENCOUNTER — PATIENT MESSAGE (OUTPATIENT)
Dept: FAMILY MEDICINE | Facility: CLINIC | Age: 58
End: 2023-07-05

## 2023-08-01 ENCOUNTER — PATIENT MESSAGE (OUTPATIENT)
Dept: FAMILY MEDICINE | Facility: CLINIC | Age: 58
End: 2023-08-01

## 2023-08-01 ENCOUNTER — OFFICE VISIT (OUTPATIENT)
Dept: FAMILY MEDICINE | Facility: CLINIC | Age: 58
End: 2023-08-01
Payer: COMMERCIAL

## 2023-08-01 VITALS
DIASTOLIC BLOOD PRESSURE: 71 MMHG | HEIGHT: 68 IN | HEART RATE: 86 BPM | WEIGHT: 157 LBS | BODY MASS INDEX: 23.79 KG/M2 | SYSTOLIC BLOOD PRESSURE: 120 MMHG

## 2023-08-01 DIAGNOSIS — R21 RASH AND NONSPECIFIC SKIN ERUPTION: Primary | ICD-10-CM

## 2023-08-01 PROCEDURE — 3074F SYST BP LT 130 MM HG: CPT | Mod: CPTII,S$GLB,, | Performed by: INTERNAL MEDICINE

## 2023-08-01 PROCEDURE — 1160F PR REVIEW ALL MEDS BY PRESCRIBER/CLIN PHARMACIST DOCUMENTED: ICD-10-PCS | Mod: CPTII,S$GLB,, | Performed by: INTERNAL MEDICINE

## 2023-08-01 PROCEDURE — 3008F PR BODY MASS INDEX (BMI) DOCUMENTED: ICD-10-PCS | Mod: CPTII,S$GLB,, | Performed by: INTERNAL MEDICINE

## 2023-08-01 PROCEDURE — 1160F RVW MEDS BY RX/DR IN RCRD: CPT | Mod: CPTII,S$GLB,, | Performed by: INTERNAL MEDICINE

## 2023-08-01 PROCEDURE — 3008F BODY MASS INDEX DOCD: CPT | Mod: CPTII,S$GLB,, | Performed by: INTERNAL MEDICINE

## 2023-08-01 PROCEDURE — 3074F PR MOST RECENT SYSTOLIC BLOOD PRESSURE < 130 MM HG: ICD-10-PCS | Mod: CPTII,S$GLB,, | Performed by: INTERNAL MEDICINE

## 2023-08-01 PROCEDURE — 99213 PR OFFICE/OUTPT VISIT, EST, LEVL III, 20-29 MIN: ICD-10-PCS | Mod: S$GLB,,, | Performed by: INTERNAL MEDICINE

## 2023-08-01 PROCEDURE — 4010F ACE/ARB THERAPY RXD/TAKEN: CPT | Mod: CPTII,S$GLB,, | Performed by: INTERNAL MEDICINE

## 2023-08-01 PROCEDURE — 3052F PR MOST RECENT HEMOGLOBIN A1C LEVEL 8.0 - < 9.0%: ICD-10-PCS | Mod: CPTII,S$GLB,, | Performed by: INTERNAL MEDICINE

## 2023-08-01 PROCEDURE — 3078F PR MOST RECENT DIASTOLIC BLOOD PRESSURE < 80 MM HG: ICD-10-PCS | Mod: CPTII,S$GLB,, | Performed by: INTERNAL MEDICINE

## 2023-08-01 PROCEDURE — 3078F DIAST BP <80 MM HG: CPT | Mod: CPTII,S$GLB,, | Performed by: INTERNAL MEDICINE

## 2023-08-01 PROCEDURE — 3052F HG A1C>EQUAL 8.0%<EQUAL 9.0%: CPT | Mod: CPTII,S$GLB,, | Performed by: INTERNAL MEDICINE

## 2023-08-01 PROCEDURE — 99213 OFFICE O/P EST LOW 20 MIN: CPT | Mod: S$GLB,,, | Performed by: INTERNAL MEDICINE

## 2023-08-01 PROCEDURE — 4010F PR ACE/ARB THEARPY RXD/TAKEN: ICD-10-PCS | Mod: CPTII,S$GLB,, | Performed by: INTERNAL MEDICINE

## 2023-08-01 PROCEDURE — 1159F PR MEDICATION LIST DOCUMENTED IN MEDICAL RECORD: ICD-10-PCS | Mod: CPTII,S$GLB,, | Performed by: INTERNAL MEDICINE

## 2023-08-01 PROCEDURE — 1159F MED LIST DOCD IN RCRD: CPT | Mod: CPTII,S$GLB,, | Performed by: INTERNAL MEDICINE

## 2023-08-01 RX ORDER — VALACYCLOVIR HYDROCHLORIDE 1 G/1
1000 TABLET, FILM COATED ORAL 3 TIMES DAILY
Qty: 21 TABLET | Refills: 0 | Status: SHIPPED | OUTPATIENT
Start: 2023-08-01 | End: 2023-09-28

## 2023-08-01 NOTE — PROGRESS NOTES
Subjective:       Patient ID: Pedro Luis Pérez is a 57 y.o. male.    Chief Complaint: Rash (Under arm )    Patient is a 57-year-old comes for same-day visit.  From Sunday he started breaking out into a rash which felt like nodules in the right armpit.  He does not recall any contact or exposure.  Monday this continued and today he keeps an appointment.  Please see the pictures in the office today.    No fever or chills.    He did have chickenpox during childhood and probably got 1 shingles vaccine recently.    He does not recall working out in the yd.  No new contacts.    While the rashes to a significant extent in the right armpit, he has some rash starting on the left armpit also.    Rash  This is a new problem. Episode onset: Last Sunday. -2 days back. The problem has been gradually worsening since onset. The affected locations include the left axilla and right axilla. Rash characteristics: Nodular scab like lesions. He was exposed to nothing. Pertinent negatives include no congestion, cough, diarrhea, facial edema, fatigue or shortness of breath. Past treatments include nothing. The treatment provided no relief. There is no history of allergies, asthma, eczema or varicella.       Past Medical History:   Diagnosis Date    Anxiety     Diabetes mellitus, type 2     History of colonoscopy 11/5/2020    Dr. Stephanie Morin.  Entire portion of the ileum was normal.  Nonbleeding internal hemorrhoids.  External examined colon is normal.  No specimens collected.  Repeat colonoscopy in 5 years for surveillance.     Social History     Socioeconomic History    Marital status:      Spouse name: Regla    Number of children: 4   Occupational History    Occupation: Self Employed- [employer]   Tobacco Use    Smoking status: Never    Smokeless tobacco: Former     Types: Chew     Quit date: 2000   Substance and Sexual Activity    Alcohol use: No    Drug use: No    Sexual activity: Yes     Partners: Female     Social  Determinants of Health     Financial Resource Strain: Low Risk  (6/15/2023)    Overall Financial Resource Strain (CARDIA)     Difficulty of Paying Living Expenses: Not very hard   Food Insecurity: No Food Insecurity (6/15/2023)    Hunger Vital Sign     Worried About Running Out of Food in the Last Year: Never true     Ran Out of Food in the Last Year: Never true   Transportation Needs: No Transportation Needs (6/15/2023)    PRAPARE - Transportation     Lack of Transportation (Medical): No     Lack of Transportation (Non-Medical): No   Physical Activity: Inactive (6/15/2023)    Exercise Vital Sign     Days of Exercise per Week: 0 days     Minutes of Exercise per Session: 0 min   Stress: Stress Concern Present (6/15/2023)    [employer] - Occupational Stress Questionnaire     Feeling of Stress : To some extent   Social Connections: Moderately Integrated (6/15/2023)    Social Connection and Isolation Panel [NHANES]     Frequency of Communication with Friends and Family: Three times a week     Frequency of Social Gatherings with Friends and Family: Three times a week     Attends Taoism Services: 1 to 4 times per year     Active Member of Clubs or Organizations: No     Attends Club or Organization Meetings: Never     Marital Status:    Housing Stability: Low Risk  (6/15/2023)    Housing Stability Vital Sign     Unable to Pay for Housing in the Last Year: No     Number of Places Lived in the Last Year: 1     Unstable Housing in the Last Year: No     Past Surgical History:   Procedure Laterality Date    COLONOSCOPY      x 2-3    LAPAROSCOPIC CHOLECYSTECTOMY N/A 5/10/2021    Procedure: CHOLECYSTECTOMY, LAPAROSCOPIC;  Surgeon: Gibran oLwe III, MD;  Location: [facility];  Service: General;  Laterality: N/A;     Family History   Problem Relation Age of Onset    Heart disease Mother     Diabetes Father        Review of Systems   Constitutional:  Negative for activity change, appetite  "change and fatigue.   HENT:  Negative for congestion, ear pain and sneezing.    Respiratory:  Negative for cough, chest tightness, shortness of breath, wheezing and stridor.    Cardiovascular:  Negative for palpitations and leg swelling.   Gastrointestinal:  Negative for abdominal distention and diarrhea.   Endocrine: Negative for polydipsia, polyphagia and polyuria.        Underlying diabetes mellitus   Genitourinary:  Negative for difficulty urinating.   Skin:  Positive for rash. Negative for pallor.   Neurological:  Negative for tremors, seizures and speech difficulty.   Psychiatric/Behavioral:  Negative for confusion. The patient is not nervous/anxious.          Objective:      Blood pressure 120/71, pulse 86, height 5' 8" (1.727 m), weight 71.2 kg (157 lb). Body mass index is 23.87 kg/m².  Physical Exam  Vitals and nursing note reviewed.   Constitutional:       General: He is not in acute distress.     Appearance: He is well-developed.      Comments: BMI is 23.87   HENT:      Head: Normocephalic and atraumatic.   Eyes:      Conjunctiva/sclera: Conjunctivae normal.   Neck:      Thyroid: No thyromegaly.      Vascular: No JVD.      Trachea: No tracheal deviation.   Cardiovascular:      Rate and Rhythm: Regular rhythm.      Heart sounds: Normal heart sounds. No murmur heard.     No friction rub. No gallop.   Pulmonary:      Effort: Pulmonary effort is normal.      Breath sounds: Normal breath sounds. No stridor.   Abdominal:      General: There is no distension.      Palpations: Abdomen is soft.      Tenderness: There is no abdominal tenderness.   Genitourinary:     Epididymis:      Right: Not inflamed.      Left: Not inflamed.   Musculoskeletal:         General: No tenderness or deformity.      Cervical back: Neck supple.      Right lower leg: No edema.      Left lower leg: No edema.   Lymphadenopathy:      Cervical: No cervical adenopathy.   Skin:     General: Skin is warm and dry.      Findings: Lesion and rash " present.          Neurological:      Mental Status: He is alert. Mental status is at baseline.   Psychiatric:         Attention and Perception: Attention normal.         Mood and Affect: Mood normal.         Speech: Speech normal.         Behavior: Behavior normal.      Comments: NOT OVERTLY DEPRESSED BUT PERHAPS SLIGHTLY ANHEDONIC.                 Assessment:               Rash and nonspecific skin eruption  Comments:  Almost looks like shingles at least in the right armpit based upon distribution.  However can not explain bilateral occurrence.  Orders:  -     valACYclovir (VALTREX) 1000 MG tablet; Take 1 tablet (1,000 mg total) by mouth 3 (three) times daily. for 7 days  Dispense: 21 tablet; Refill: 0       Office Visit on 05/08/2023   Component Date Value Ref Range Status    POC Rapid COVID 05/08/2023 Positive (A)  Negative Final     Acceptable 05/08/2023 Yes   Final         Plan:           Rash and nonspecific skin eruption  Comments:  Almost looks like shingles at least in the right armpit based upon distribution.  However can not explain bilateral occurrence.  Orders:  -     valACYclovir (VALTREX) 1000 MG tablet; Take 1 tablet (1,000 mg total) by mouth 3 (three) times daily. for 7 days  Dispense: 21 tablet; Refill: 0    Probably not shingles because of bilateral distribution symmetrical in both the armpits more on the right as compared to left.  Please see the pictures.    Not sure if it is localized staph infection or ant bites or some allergic reaction.  Will treat with cool compresses.  Have to make a decision if I should treat him with Valtrex if there is a small chance that it might be shingles.      It is unlikely that he will get an appointment with Dermatology in the next 3 or 4 days.    Will call Valtrex 1 g t.i.d. for 7 days.  Cool compresses.  Calamine lotion.    He did have chickenpox during childhood.    He did get 1 shingles vaccine at least.    Keep regular follow-up.    I have  sent him a message on the portal on Friday to let us know how is the rash doing.        Current Outpatient Medications:     aspirin (ECOTRIN) 81 MG EC tablet, Take 1 tablet (81 mg total) by mouth Daily., Disp: 100 tablet, Rfl: 1    azelastine (ASTELIN) 137 mcg (0.1 %) nasal spray, SPRAY 1 SPAY IN EACH NOSTRIL TWICE DAILY, Disp: 30 mL, Rfl: 2    blood sugar diagnostic Strp, 1 each by Other route., Disp: , Rfl:     empagliflozin (JARDIANCE) 25 mg tablet, Take 1 tablet (25 mg total) by mouth once daily., Disp: 90 tablet, Rfl: 1    EScitalopram oxalate (LEXAPRO) 10 MG tablet, , Disp: , Rfl:     linagliptin-metformin (JENTADUETO XR) 5-1,000 mg TBph, Take 1 tablet by mouth once daily., Disp: 90 each, Rfl: 1    mupirocin (BACTROBAN) 2 % ointment, Apply topically on the armpit at nighttime., Disp: 22 g, Rfl: 0    ramipriL (ALTACE) 1.25 MG capsule, TAKE 1 CAPSULE(1.25 MG) BY MOUTH EVERY DAY, Disp: 90 capsule, Rfl: 1    valACYclovir (VALTREX) 1000 MG tablet, Take 1 tablet (1,000 mg total) by mouth 3 (three) times daily. for 7 days, Disp: 21 tablet, Rfl: 0

## 2023-08-21 RX ORDER — LINAGLIPTIN AND METFORMIN HYDROCHLORIDE 5; 1000 MG/1; MG/1
1 TABLET, FILM COATED, EXTENDED RELEASE ORAL
Qty: 90 EACH | Refills: 2 | Status: SHIPPED | OUTPATIENT
Start: 2023-08-21 | End: 2024-02-26 | Stop reason: SDUPTHER

## 2023-08-21 RX ORDER — LINAGLIPTIN AND METFORMIN HYDROCHLORIDE 5; 1000 MG/1; MG/1
1 TABLET, FILM COATED, EXTENDED RELEASE ORAL DAILY
Qty: 90 EACH | Refills: 1 | OUTPATIENT
Start: 2023-08-21

## 2023-08-21 NOTE — TELEPHONE ENCOUNTER
Pt states he is out of his medication.  Last ov 08/2023 you should have a rf in your box for  jentadueto

## 2023-08-22 ENCOUNTER — TELEPHONE (OUTPATIENT)
Dept: FAMILY MEDICINE | Facility: CLINIC | Age: 58
End: 2023-08-22

## 2023-08-22 NOTE — TELEPHONE ENCOUNTER
Pt's wife called about rx refill.  Attempted to call but no ans.  Didn't leave option to leave voicemail.  Rx was sent to pharm. Yesterday.  Sent message in pt. Portal.

## 2023-09-08 NOTE — PROGRESS NOTES
Medication was changed from Janumet to Jentodueto as per pharmacy advice due to formulary change.

## 2023-09-23 LAB
ALBUMIN SERPL-MCNC: 4.7 G/DL (ref 3.8–4.9)
ALBUMIN/CREAT UR: 11 MG/G CREAT (ref 0–29)
ALBUMIN/GLOB SERPL: 2.5 {RATIO} (ref 1.2–2.2)
ALP SERPL-CCNC: 61 IU/L (ref 44–121)
ALT SERPL-CCNC: 20 IU/L (ref 0–44)
AST SERPL-CCNC: 20 IU/L (ref 0–40)
BILIRUB SERPL-MCNC: 0.5 MG/DL (ref 0–1.2)
BUN SERPL-MCNC: 17 MG/DL (ref 8–27)
BUN/CREAT SERPL: 23 (ref 10–24)
CALCIUM SERPL-MCNC: 9.4 MG/DL (ref 8.6–10.2)
CHLORIDE SERPL-SCNC: 105 MMOL/L (ref 96–106)
CO2 SERPL-SCNC: 17 MMOL/L (ref 20–29)
CREAT SERPL-MCNC: 0.74 MG/DL (ref 0.76–1.27)
CREAT UR-MCNC: 45.6 MG/DL
EST. GFR  (NO RACE VARIABLE): 104 ML/MIN/1.73
GLOBULIN SER CALC-MCNC: 1.9 G/DL (ref 1.5–4.5)
GLUCOSE SERPL-MCNC: 214 MG/DL (ref 70–99)
HBA1C MFR BLD: 9 % (ref 4.8–5.6)
MICROALBUMIN UR-MCNC: 5.2 UG/ML
POTASSIUM SERPL-SCNC: 4.7 MMOL/L (ref 3.5–5.2)
PROT SERPL-MCNC: 6.6 G/DL (ref 6–8.5)
PSA SERPL-MCNC: 0.9 NG/ML (ref 0–4)
SODIUM SERPL-SCNC: 138 MMOL/L (ref 134–144)

## 2023-09-28 ENCOUNTER — OFFICE VISIT (OUTPATIENT)
Dept: FAMILY MEDICINE | Facility: CLINIC | Age: 58
End: 2023-09-28
Payer: COMMERCIAL

## 2023-09-28 ENCOUNTER — PATIENT MESSAGE (OUTPATIENT)
Dept: FAMILY MEDICINE | Facility: CLINIC | Age: 58
End: 2023-09-28

## 2023-09-28 ENCOUNTER — TELEPHONE (OUTPATIENT)
Dept: DIABETES | Facility: CLINIC | Age: 58
End: 2023-09-28

## 2023-09-28 ENCOUNTER — TELEPHONE (OUTPATIENT)
Dept: FAMILY MEDICINE | Facility: CLINIC | Age: 58
End: 2023-09-28

## 2023-09-28 VITALS
BODY MASS INDEX: 23.64 KG/M2 | SYSTOLIC BLOOD PRESSURE: 112 MMHG | WEIGHT: 156 LBS | HEIGHT: 68 IN | DIASTOLIC BLOOD PRESSURE: 72 MMHG | HEART RATE: 73 BPM

## 2023-09-28 DIAGNOSIS — E78.5 DYSLIPIDEMIA: ICD-10-CM

## 2023-09-28 DIAGNOSIS — E11.9 TYPE 2 DIABETES MELLITUS WITHOUT COMPLICATION, WITHOUT LONG-TERM CURRENT USE OF INSULIN: Primary | Chronic | ICD-10-CM

## 2023-09-28 DIAGNOSIS — M25.511 ACUTE PAIN OF RIGHT SHOULDER: ICD-10-CM

## 2023-09-28 DIAGNOSIS — E11.9 TYPE 2 DIABETES MELLITUS WITHOUT COMPLICATION, WITHOUT LONG-TERM CURRENT USE OF INSULIN: Primary | ICD-10-CM

## 2023-09-28 PROCEDURE — 3008F PR BODY MASS INDEX (BMI) DOCUMENTED: ICD-10-PCS | Mod: CPTII,S$GLB,, | Performed by: INTERNAL MEDICINE

## 2023-09-28 PROCEDURE — 3061F PR NEG MICROALBUMINURIA RESULT DOCUMENTED/REVIEW: ICD-10-PCS | Mod: CPTII,S$GLB,, | Performed by: INTERNAL MEDICINE

## 2023-09-28 PROCEDURE — 1160F PR REVIEW ALL MEDS BY PRESCRIBER/CLIN PHARMACIST DOCUMENTED: ICD-10-PCS | Mod: CPTII,S$GLB,, | Performed by: INTERNAL MEDICINE

## 2023-09-28 PROCEDURE — 3066F PR DOCUMENTATION OF TREATMENT FOR NEPHROPATHY: ICD-10-PCS | Mod: CPTII,S$GLB,, | Performed by: INTERNAL MEDICINE

## 2023-09-28 PROCEDURE — 3061F NEG MICROALBUMINURIA REV: CPT | Mod: CPTII,S$GLB,, | Performed by: INTERNAL MEDICINE

## 2023-09-28 PROCEDURE — 1159F PR MEDICATION LIST DOCUMENTED IN MEDICAL RECORD: ICD-10-PCS | Mod: CPTII,S$GLB,, | Performed by: INTERNAL MEDICINE

## 2023-09-28 PROCEDURE — 3052F HG A1C>EQUAL 8.0%<EQUAL 9.0%: CPT | Mod: CPTII,S$GLB,, | Performed by: INTERNAL MEDICINE

## 2023-09-28 PROCEDURE — 3078F DIAST BP <80 MM HG: CPT | Mod: CPTII,S$GLB,, | Performed by: INTERNAL MEDICINE

## 2023-09-28 PROCEDURE — 3074F SYST BP LT 130 MM HG: CPT | Mod: CPTII,S$GLB,, | Performed by: INTERNAL MEDICINE

## 2023-09-28 PROCEDURE — 4010F ACE/ARB THERAPY RXD/TAKEN: CPT | Mod: CPTII,S$GLB,, | Performed by: INTERNAL MEDICINE

## 2023-09-28 PROCEDURE — 4010F PR ACE/ARB THEARPY RXD/TAKEN: ICD-10-PCS | Mod: CPTII,S$GLB,, | Performed by: INTERNAL MEDICINE

## 2023-09-28 PROCEDURE — 3052F PR MOST RECENT HEMOGLOBIN A1C LEVEL 8.0 - < 9.0%: ICD-10-PCS | Mod: CPTII,S$GLB,, | Performed by: INTERNAL MEDICINE

## 2023-09-28 PROCEDURE — 1159F MED LIST DOCD IN RCRD: CPT | Mod: CPTII,S$GLB,, | Performed by: INTERNAL MEDICINE

## 2023-09-28 PROCEDURE — 3078F PR MOST RECENT DIASTOLIC BLOOD PRESSURE < 80 MM HG: ICD-10-PCS | Mod: CPTII,S$GLB,, | Performed by: INTERNAL MEDICINE

## 2023-09-28 PROCEDURE — 99214 OFFICE O/P EST MOD 30 MIN: CPT | Mod: S$GLB,,, | Performed by: INTERNAL MEDICINE

## 2023-09-28 PROCEDURE — 3074F PR MOST RECENT SYSTOLIC BLOOD PRESSURE < 130 MM HG: ICD-10-PCS | Mod: CPTII,S$GLB,, | Performed by: INTERNAL MEDICINE

## 2023-09-28 PROCEDURE — 3066F NEPHROPATHY DOC TX: CPT | Mod: CPTII,S$GLB,, | Performed by: INTERNAL MEDICINE

## 2023-09-28 PROCEDURE — 3008F BODY MASS INDEX DOCD: CPT | Mod: CPTII,S$GLB,, | Performed by: INTERNAL MEDICINE

## 2023-09-28 PROCEDURE — 1160F RVW MEDS BY RX/DR IN RCRD: CPT | Mod: CPTII,S$GLB,, | Performed by: INTERNAL MEDICINE

## 2023-09-28 PROCEDURE — 99214 PR OFFICE/OUTPT VISIT, EST, LEVL IV, 30-39 MIN: ICD-10-PCS | Mod: S$GLB,,, | Performed by: INTERNAL MEDICINE

## 2023-09-28 RX ORDER — ROSUVASTATIN CALCIUM 5 MG/1
5 TABLET, COATED ORAL
Qty: 24 TABLET | Refills: 3 | Status: SHIPPED | OUTPATIENT
Start: 2023-09-28 | End: 2024-09-27

## 2023-09-28 NOTE — TELEPHONE ENCOUNTER
Must be this ref code for nutrition   pended       Type 2 diabetes mellitus without complication, without long-term current use of insulin  -     Ambulatory referral/consult to Diabetes Education; Future; Expected date: 10/05/2023

## 2023-09-28 NOTE — PROGRESS NOTES
Subjective:       Patient ID: Pedro Luis Pérez is a 58 y.o. male.    Chief Complaint: Diabetes, Follow-up, Hyperlipidemia, and Shoulder Pain    Mr. Jeramie Pérez is a 58-year-old gentleman who comes for follow-up.    Underlying medical issues include the following-    1.-type 2 diabetes mellitus-  2.-hyperlipidemia   3.-mood disorder  4.-recent diagnosis of COVID-19 treated with Paxlovid.    5.-recent diagnosis of right groin and flank pain  6.-recent diagnosis of kidney stone nonobstructing based upon a CT scan.  Presented with right groin pain.    Control of diabetes continues to be challenging with recent hemoglobin A1c of 9.0.  They have been persistently elevated.      Currently he is on Jardiance at maximum dose of 25 mg and linagliptin metformin combination Jentodueto    He confesses to not following a strict diet.  A nutrition consult was sent earlier but nobody has called him as yet.    CONSIDERATION NOW NEEDS TO BE GIVEN FOR GLP 1 AGONIST IN WHICH CASE THE LINAGLIPTIN COMPONENT WILL BE DISCONTINUED AND HE SHOULD BE PUT ON PAIN METFORMIN.    Recent previous visits have been for axillary rash which was treated as shingles but not confirmed.  Could have been a staph infection.    Prior to that he was seen for COVID positive status.    PSA screening is normal.        Low-dose stress and anxiety has been noted.  Some degree of irritability.  He is on Lexapro for the same.    For kidney protection he takes ramipril 1.25 mg. He is also on aspirin.    For sinus problems he takes Astelin nasal spray.      Today we discussed about pneumonia vaccine, low-dose statin and shingles vaccine.    He also has some shoulder pain on the right side and would like this to be addressed.  He does not recall any clear-cut history of trauma or injury.    Hyperlipidemia  This is a chronic problem. The problem is uncontrolled. Exacerbating diseases include diabetes. He is currently on no antihyperlipidemic treatment (He is currently not  on any treatment for cholesterol.). The current treatment provides mild improvement of lipids. Risk factors for coronary artery disease include male sex, a sedentary lifestyle, dyslipidemia and diabetes mellitus.   Diabetes  He presents for his follow-up diabetic visit. He has type 2 diabetes mellitus. The initial diagnosis of diabetes was made 10 years ago. His disease course has been worsening (HEMOGLOBIN A1C HAS COME GONE GREATER THAN 9. ). There are no hypoglycemic complications. Risk factors for coronary artery disease include diabetes mellitus, dyslipidemia, male sex and sedentary lifestyle. Current diabetic treatment includes oral agent (triple therapy). He is compliant with treatment some of the time. When asked about meal planning, he reported none. He has not had a previous visit with a dietitian. He rarely participates in exercise. An ACE inhibitor/angiotensin II receptor blocker is being taken. He does not see a podiatrist.  Shoulder Pain   The pain is present in the right shoulder. This is a new problem. The current episode started more than 1 month ago. There has been a history of trauma. The problem occurs constantly. The problem has been unchanged. Associated symptoms include a limited range of motion and stiffness. Pertinent negatives include no fever or joint swelling. The symptoms are aggravated by activity. The treatment provided no relief. His past medical history is significant for diabetes and Injuries to Extremity. Possibly he was pulling or pushing something       Past Medical History:   Diagnosis Date    Anxiety     Diabetes mellitus, type 2     History of colonoscopy 11/5/2020    Dr. Stephanie Morin.  Entire portion of the ileum was normal.  Nonbleeding internal hemorrhoids.  External examined colon is normal.  No specimens collected.  Repeat colonoscopy in 5 years for surveillance.     Social History     Socioeconomic History    Marital status:      Spouse name: Regla    Number of  children: 4   Occupational History    Occupation: Self Employed- [employer]   Tobacco Use    Smoking status: Never    Smokeless tobacco: Former     Types: Chew     Quit date: 2000   Substance and Sexual Activity    Alcohol use: No    Drug use: No    Sexual activity: Yes     Partners: Female     Social Determinants of Health     Financial Resource Strain: Low Risk  (6/15/2023)    Overall Financial Resource Strain (CARDIA)     Difficulty of Paying Living Expenses: Not very hard   Food Insecurity: No Food Insecurity (6/15/2023)    Hunger Vital Sign     Worried About Running Out of Food in the Last Year: Never true     Ran Out of Food in the Last Year: Never true   Transportation Needs: No Transportation Needs (6/15/2023)    PRAPARE - Transportation     Lack of Transportation (Medical): No     Lack of Transportation (Non-Medical): No   Physical Activity: Inactive (6/15/2023)    Exercise Vital Sign     Days of Exercise per Week: 0 days     Minutes of Exercise per Session: 0 min   Stress: Stress Concern Present (6/15/2023)    [employer] - Occupational Stress Questionnaire     Feeling of Stress : To some extent   Social Connections: Moderately Integrated (6/15/2023)    Social Connection and Isolation Panel [NHANES]     Frequency of Communication with Friends and Family: Three times a week     Frequency of Social Gatherings with Friends and Family: Three times a week     Attends Yarsani Services: 1 to 4 times per year     Active Member of Clubs or Organizations: No     Attends Club or Organization Meetings: Never     Marital Status:    Housing Stability: Low Risk  (6/15/2023)    Housing Stability Vital Sign     Unable to Pay for Housing in the Last Year: No     Number of Places Lived in the Last Year: 1     Unstable Housing in the Last Year: No     Past Surgical History:   Procedure Laterality Date    COLONOSCOPY      x 2-3    LAPAROSCOPIC CHOLECYSTECTOMY N/A 5/10/2021    Procedure:  "CHOLECYSTECTOMY, LAPAROSCOPIC;  Surgeon: Gibran Lowe III, MD;  Location: [facility];  Service: General;  Laterality: N/A;     Family History   Problem Relation Age of Onset    Heart disease Mother     Diabetes Father        Review of Systems   Constitutional:  Negative for fever.        Recent history of COVID-19.  Recovering.   Respiratory:  Negative for chest tightness, wheezing and stridor.    Endocrine:        Underlying diabetes mellitus.  Poorly controlled.  Recent hemoglobin A1c is 9.0.  Currently on empagliflozin and linagliptin/metformin combination.   Genitourinary:         Recent PSA is within normal range.   Musculoskeletal:  Positive for stiffness.   Psychiatric/Behavioral:          Low level of anxiety and stress.  Currently on Lexapro.  Stable.         Objective:      Blood pressure 112/72, pulse 73, height 5' 8" (1.727 m), weight 70.8 kg (156 lb). Body mass index is 23.72 kg/m².  Physical Exam  Vitals and nursing note reviewed.   Constitutional:       General: He is not in acute distress.     Appearance: He is well-developed. He is not ill-appearing or diaphoretic.      Comments: BMI is 23.72   HENT:      Head: Normocephalic and atraumatic.      Mouth/Throat:      Pharynx: No posterior oropharyngeal erythema.   Eyes:      Conjunctiva/sclera: Conjunctivae normal.   Neck:      Thyroid: No thyromegaly.      Vascular: No JVD.      Trachea: No tracheal deviation.   Cardiovascular:      Rate and Rhythm: Regular rhythm.      Heart sounds: Normal heart sounds. No murmur heard.     No friction rub. No gallop.   Pulmonary:      Effort: Pulmonary effort is normal.      Breath sounds: Normal breath sounds. No stridor.   Abdominal:      General: There is no distension.      Palpations: Abdomen is soft.      Tenderness: There is no abdominal tenderness.   Genitourinary:     Epididymis:      Right: Not inflamed.      Left: Not inflamed.      Comments: Some pain in the right groin and right hip region lingering " from past but not completely gone away.  Musculoskeletal:         General: No tenderness or deformity.        Arms:       Cervical back: Neck supple.      Right lower leg: No edema.      Left lower leg: No edema.      Comments: Range of motion of right shoulder is somewhat restricted.   Feet:      Comments: Foot examination has not been done today.  Lymphadenopathy:      Cervical: No cervical adenopathy.   Skin:     General: Skin is warm and dry.      Findings: No lesion or rash.   Neurological:      Mental Status: He is alert and oriented to person, place, and time. Mental status is at baseline.   Psychiatric:         Attention and Perception: Attention normal.         Speech: Speech normal.         Behavior: Behavior normal.      Comments: NOT OVERTLY DEPRESSED BUT PERHAPS SLIGHTLY ANHEDONIC.           Assessment:             Type 2 diabetes mellitus without complication, without long-term current use of insulin  Comments:  Again referral has been given to dietitian.  Defer the decision for GLP 1 or insulin at this point.  Let us see how he does with diet.  Orders:  -     rosuvastatin (CRESTOR) 5 MG tablet; Take 1 tablet (5 mg total) by mouth every Monday and Thursday.  Dispense: 24 tablet; Refill: 3  -     Ambulatory Referral/Consult to Nutrition Services - [facility]; Future; Expected date: 10/05/2023  -     Hemoglobin A1C; Future; Expected date: 01/08/2024  -     ALT (SGPT); Future; Expected date: 01/08/2024    Dyslipidemia  Comments:  Has somewhat LDL elevation.  Agrees to take rosuvastatin or Crestor at least twice a week.  Prescription sent to pharmacy.  Orders:  -     rosuvastatin (CRESTOR) 5 MG tablet; Take 1 tablet (5 mg total) by mouth every Monday and Thursday.  Dispense: 24 tablet; Refill: 3  -     Lipid Panel; Future; Expected date: 01/08/2024    Acute pain of right shoulder  Comments:  Probably tendinitis versus rotator cuff problem.  May benefit from physical therapy.  Referral given for  [facility].  Orders:  -     Ambulatory referral/consult to Physical/Occupational Therapy; Future; Expected date: 10/05/2023       Component Ref Range & Units 6 d ago  (9/22/23) 5 mo ago  (4/26/23) 1 yr ago  (6/28/22) 1 yr ago  (1/4/22) 2 yr ago  (11/5/20) 4 yr ago  (9/6/19) 4 yr ago  (4/24/19)   Hemoglobin A1c 4.8 - 5.6 % 9.0 High   8.8 High  CM  9.2 High  CM  8.3 High  CM  8.6 High  CM  7.8 High  CM  7.3 High  CM    Comment:          Prediabetes: 5.7 - 6.4        No visits with results within 3 Month(s) from this visit.   Latest known visit with results is:   Office Visit on 06/15/2023   Component Date Value Ref Range Status    Hemoglobin A1c 09/22/2023 9.0 (H)  4.8 - 5.6 % Final    PSA - [facility] 09/22/2023 0.9  0.0 - 4.0 ng/mL Final    Creatinine, Urine 09/22/2023 45.6  Not Estab. mg/dL Final    Microalb, Ur 09/22/2023 5.2  Not Estab. ug/mL Final    Microalb/Crt. Ratio 09/22/2023 11  0 - 29 mg/g creat Final    Glucose 09/22/2023 214 (H)  70 - 99 mg/dL Final    BUN 09/22/2023 17  8 - 27 mg/dL Final    Creatinine 09/22/2023 0.74 (L)  0.76 - 1.27 mg/dL Final    eGFR 09/22/2023 104  >59 mL/min/1.73 Final    BUN/Creatinine Ratio 09/22/2023 23  10 - 24 Final    Sodium 09/22/2023 138  134 - 144 mmol/L Final    Potassium 09/22/2023 4.7  3.5 - 5.2 mmol/L Final    Chloride 09/22/2023 105  96 - 106 mmol/L Final    CO2 09/22/2023 17 (L)  20 - 29 mmol/L Final    Calcium 09/22/2023 9.4  8.6 - 10.2 mg/dL Final    Protein, Total 09/22/2023 6.6  6.0 - 8.5 g/dL Final    Albumin 09/22/2023 4.7  3.8 - 4.9 g/dL Final    Globulin, Total 09/22/2023 1.9  1.5 - 4.5 g/dL Final    Albumin/Globulin Ratio 09/22/2023 2.5 (H)  1.2 - 2.2 Final    Total Bilirubin 09/22/2023 0.5  0.0 - 1.2 mg/dL Final    Alkaline Phosphatase 09/22/2023 61  44 - 121 IU/L Final    AST 09/22/2023 20  0 - 40 IU/L Final    ALT 09/22/2023 20  0 - 44 IU/L Final         Plan:           Type 2 diabetes mellitus without complication, without long-term current  use of insulin  Comments:  Again referral has been given to dietitian.  Defer the decision for GLP 1 or insulin at this point.  Let us see how he does with diet.  Orders:  -     rosuvastatin (CRESTOR) 5 MG tablet; Take 1 tablet (5 mg total) by mouth every Monday and Thursday.  Dispense: 24 tablet; Refill: 3  -     Ambulatory Referral/Consult to Nutrition Services - [facility]; Future; Expected date: 10/05/2023  -     Hemoglobin A1C; Future; Expected date: 01/08/2024  -     ALT (SGPT); Future; Expected date: 01/08/2024    Dyslipidemia  Comments:  Has somewhat LDL elevation.  Agrees to take rosuvastatin or Crestor at least twice a week.  Prescription sent to pharmacy.  Orders:  -     rosuvastatin (CRESTOR) 5 MG tablet; Take 1 tablet (5 mg total) by mouth every Monday and Thursday.  Dispense: 24 tablet; Refill: 3  -     Lipid Panel; Future; Expected date: 01/08/2024    Acute pain of right shoulder  Comments:  Probably tendinitis versus rotator cuff problem.  May benefit from physical therapy.  Referral given for [place] physical therapy.  Orders:  -     Ambulatory referral/consult to Physical/Occupational Therapy; Future; Expected date: 10/05/2023      Diabetes control continues to be challenging at this point.  Recent hemoglobin A1c is 9.0.  He confesses to not watching his diet and would like to see a dietitian.  Referral given and printed out again.  I will check with my staff to see that he gets a dietary evaluation.    His lipid panel is elevated and previously he might have had problems with statin medications.  However he agrees to try rosuvastatin at 5 mg at least twice a week.      He does have some right shoulder pain which is suggestive of a bursitis versus rotator cuff problem.  He was doing some work and he might have pulled a shoulder ligaments or muscles.  I doubt any broken bones.      Next time will consider again pneumonia vaccine and other immunization measures.    Continue to watch diet  and exercise.      Advised Pt about age and season appropriate immunizations/ cancer screenings.  Also seasonal influenza vaccine, update on tetanus diphtheria vaccination every 10 years.  Patient has been advised to watch diet and exercise. Avoid fried and fatty food. Compliance to medications and follow up urged.  Advised Pt. to monitor Blood sugars at home and record them.  Advised Pt  for Anti reflux measures like small feequent meals, avoid spicy and greasy food. Head end up at night.  keep a close eye on feet and keep them clean. Annual eye examination. Annual influenza vaccine.  Monitor HgbA1c every 3 to 6 months. Monitor urine microalbumin every year.keep LDL less than 100. Monitor blood pressure and target blood pressure 120/70.  Please utilize precautions for current COVID-19 pandemic.  Try to avoid crowds or close contact with multiple people.  Minimize outside interaction.  Wash hands with soap for  frequently upon contact.Use face mask or cover.    Fup 4 m    Spent yamileth 30 minutes with patient which involved review of pts medical conditions, labs, medications and with 50% of time face-to-face discussion about medical problems, management and any applicable changes.      Current Outpatient Medications:     aspirin (ECOTRIN) 81 MG EC tablet, Take 1 tablet (81 mg total) by mouth Daily., Disp: 100 tablet, Rfl: 1    azelastine (ASTELIN) 137 mcg (0.1 %) nasal spray, SPRAY 1 SPAY IN EACH NOSTRIL TWICE DAILY, Disp: 30 mL, Rfl: 2    blood sugar diagnostic Strp, 1 each by Other route., Disp: , Rfl:     empagliflozin (JARDIANCE) 25 mg tablet, Take 1 tablet (25 mg total) by mouth once daily., Disp: 90 tablet, Rfl: 1    EScitalopram oxalate (LEXAPRO) 10 MG tablet, , Disp: , Rfl:     linagliptin-metformin (JENTADUETO XR) 5-1,000 mg TBph, TAKE 1 TABLET BY MOUTH EVERY DAY, Disp: 90 each, Rfl: 2    mupirocin (BACTROBAN) 2 % ointment, Apply topically on the armpit at nighttime., Disp: 22 g, Rfl: 0    ramipriL  (ALTACE) 1.25 MG capsule, TAKE 1 CAPSULE(1.25 MG) BY MOUTH EVERY DAY, Disp: 90 capsule, Rfl: 1    rosuvastatin (CRESTOR) 5 MG tablet, Take 1 tablet (5 mg total) by mouth every Monday and Thursday., Disp: 24 tablet, Rfl: 3

## 2023-10-17 ENCOUNTER — PATIENT MESSAGE (OUTPATIENT)
Dept: DIABETES | Facility: CLINIC | Age: 58
End: 2023-10-17

## 2023-10-18 ENCOUNTER — TELEPHONE (OUTPATIENT)
Dept: FAMILY MEDICINE | Facility: CLINIC | Age: 58
End: 2023-10-18

## 2023-10-18 ENCOUNTER — PATIENT MESSAGE (OUTPATIENT)
Dept: FAMILY MEDICINE | Facility: CLINIC | Age: 58
End: 2023-10-18

## 2023-10-18 DIAGNOSIS — M25.511 ACUTE PAIN OF RIGHT SHOULDER: Primary | ICD-10-CM

## 2023-10-18 NOTE — TELEPHONE ENCOUNTER
Pt is going to [facility] and they said he is not getting better.  Needs either a mri and xray of shoulder   or referral to ortho  he said someone in [place] is fine  you pick       Acute pain of right shoulder  -     Ambulatory referral/consult to Orthopedics; Future; Expected date: 10/25/2023

## 2023-10-19 DIAGNOSIS — M25.511 RIGHT SHOULDER PAIN, UNSPECIFIED CHRONICITY: Primary | ICD-10-CM

## 2023-10-20 ENCOUNTER — OFFICE VISIT (OUTPATIENT)
Dept: ORTHOPEDICS | Facility: CLINIC | Age: 58
End: 2023-10-20
Payer: COMMERCIAL

## 2023-10-20 ENCOUNTER — HOSPITAL ENCOUNTER (OUTPATIENT)
Dept: RADIOLOGY | Facility: HOSPITAL | Age: 58
Discharge: HOME | End: 2023-10-20
Attending: ORTHOPAEDIC SURGERY
Payer: COMMERCIAL

## 2023-10-20 VITALS — BODY MASS INDEX: 23.64 KG/M2 | HEIGHT: 68 IN | RESPIRATION RATE: 18 BRPM | WEIGHT: 156 LBS

## 2023-10-20 DIAGNOSIS — M25.511 ACUTE PAIN OF RIGHT SHOULDER: ICD-10-CM

## 2023-10-20 DIAGNOSIS — M54.42 CHRONIC MIDLINE LOW BACK PAIN WITH BILATERAL SCIATICA: ICD-10-CM

## 2023-10-20 DIAGNOSIS — M25.511 RIGHT SHOULDER PAIN, UNSPECIFIED CHRONICITY: ICD-10-CM

## 2023-10-20 DIAGNOSIS — M75.01 ADHESIVE CAPSULITIS OF RIGHT SHOULDER ASSOCIATED WITH TYPE 2 DIABETES MELLITUS: Primary | ICD-10-CM

## 2023-10-20 DIAGNOSIS — M54.41 CHRONIC MIDLINE LOW BACK PAIN WITH BILATERAL SCIATICA: ICD-10-CM

## 2023-10-20 DIAGNOSIS — E11.618 ADHESIVE CAPSULITIS OF RIGHT SHOULDER ASSOCIATED WITH TYPE 2 DIABETES MELLITUS: Primary | ICD-10-CM

## 2023-10-20 DIAGNOSIS — G89.29 CHRONIC MIDLINE LOW BACK PAIN WITH BILATERAL SCIATICA: ICD-10-CM

## 2023-10-20 PROCEDURE — 4010F PR ACE/ARB THEARPY RXD/TAKEN: ICD-10-PCS | Mod: CPTII,S$GLB,, | Performed by: ORTHOPAEDIC SURGERY

## 2023-10-20 PROCEDURE — 99999 PR PBB SHADOW E&M-EST. PATIENT-LVL IV: CPT | Mod: PBBFAC,,, | Performed by: ORTHOPAEDIC SURGERY

## 2023-10-20 PROCEDURE — 1159F MED LIST DOCD IN RCRD: CPT | Mod: CPTII,S$GLB,, | Performed by: ORTHOPAEDIC SURGERY

## 2023-10-20 PROCEDURE — 3061F NEG MICROALBUMINURIA REV: CPT | Mod: CPTII,S$GLB,, | Performed by: ORTHOPAEDIC SURGERY

## 2023-10-20 PROCEDURE — 3052F HG A1C>EQUAL 8.0%<EQUAL 9.0%: CPT | Mod: CPTII,S$GLB,, | Performed by: ORTHOPAEDIC SURGERY

## 2023-10-20 PROCEDURE — 1159F PR MEDICATION LIST DOCUMENTED IN MEDICAL RECORD: ICD-10-PCS | Mod: CPTII,S$GLB,, | Performed by: ORTHOPAEDIC SURGERY

## 2023-10-20 PROCEDURE — 73030 X-RAY EXAM OF SHOULDER: CPT | Mod: 26,RT,, | Performed by: RADIOLOGY

## 2023-10-20 PROCEDURE — 3052F PR MOST RECENT HEMOGLOBIN A1C LEVEL 8.0 - < 9.0%: ICD-10-PCS | Mod: CPTII,S$GLB,, | Performed by: ORTHOPAEDIC SURGERY

## 2023-10-20 PROCEDURE — 99999 PR PBB SHADOW E&M-EST. PATIENT-LVL IV: ICD-10-PCS | Mod: PBBFAC,,, | Performed by: ORTHOPAEDIC SURGERY

## 2023-10-20 PROCEDURE — 4010F ACE/ARB THERAPY RXD/TAKEN: CPT | Mod: CPTII,S$GLB,, | Performed by: ORTHOPAEDIC SURGERY

## 2023-10-20 PROCEDURE — 3061F PR NEG MICROALBUMINURIA RESULT DOCUMENTED/REVIEW: ICD-10-PCS | Mod: CPTII,S$GLB,, | Performed by: ORTHOPAEDIC SURGERY

## 2023-10-20 PROCEDURE — 3008F PR BODY MASS INDEX (BMI) DOCUMENTED: ICD-10-PCS | Mod: CPTII,S$GLB,, | Performed by: ORTHOPAEDIC SURGERY

## 2023-10-20 PROCEDURE — 99204 OFFICE O/P NEW MOD 45 MIN: CPT | Mod: S$GLB,,, | Performed by: ORTHOPAEDIC SURGERY

## 2023-10-20 PROCEDURE — 99204 PR OFFICE/OUTPT VISIT, NEW, LEVL IV, 45-59 MIN: ICD-10-PCS | Mod: S$GLB,,, | Performed by: ORTHOPAEDIC SURGERY

## 2023-10-20 PROCEDURE — 3066F PR DOCUMENTATION OF TREATMENT FOR NEPHROPATHY: ICD-10-PCS | Mod: CPTII,S$GLB,, | Performed by: ORTHOPAEDIC SURGERY

## 2023-10-20 PROCEDURE — 73030 X-RAY EXAM OF SHOULDER: CPT | Mod: TC,PO,RT

## 2023-10-20 PROCEDURE — 3008F BODY MASS INDEX DOCD: CPT | Mod: CPTII,S$GLB,, | Performed by: ORTHOPAEDIC SURGERY

## 2023-10-20 PROCEDURE — 3066F NEPHROPATHY DOC TX: CPT | Mod: CPTII,S$GLB,, | Performed by: ORTHOPAEDIC SURGERY

## 2023-10-20 PROCEDURE — 73030 XR SHOULDER TRAUMA 3 VIEW RIGHT: ICD-10-PCS | Mod: 26,RT,, | Performed by: RADIOLOGY

## 2023-10-20 NOTE — PROGRESS NOTES
Patient ID: Pedro Luis Pérez is a 58 y.o. male    Chief Complaint:   Chief Complaint   Patient presents with    Right Shoulder - Pain       History of Present Illness:    Pleasant 58-year-old right-hand dominant male with diabetes, last hemoglobin A1c 9, who is here for evaluation of right shoulder pain which has been present for the past 6-8 months.  He has been in physical therapy for 3 rounds without any significant improvement in his pain or range of motion.  Denies any trauma or falls.  Has difficulty with overhead activity and reaching behind him.    PAST MEDICAL HISTORY:   Past Medical History:   Diagnosis Date    Anxiety     Diabetes mellitus, type 2     History of colonoscopy 11/5/2020    Dr. Stephanie Morin.  Entire portion of the ileum was normal.  Nonbleeding internal hemorrhoids.  External examined colon is normal.  No specimens collected.  Repeat colonoscopy in 5 years for surveillance.     PAST SURGICAL HISTORY:   Past Surgical History:   Procedure Laterality Date    COLONOSCOPY      x 2-3    LAPAROSCOPIC CHOLECYSTECTOMY N/A 5/10/2021    Procedure: CHOLECYSTECTOMY, LAPAROSCOPIC;  Surgeon: Gibran Lowe III, MD;  Location: [facility];  Service: General;  Laterality: N/A;     FAMILY HISTORY:   Family History   Problem Relation Age of Onset    Heart disease Mother     Diabetes Father      SOCIAL HISTORY:   Social History     Occupational History    Occupation: Self Employed- [employer]   Tobacco Use    Smoking status: Never    Smokeless tobacco: Former     Types: Chew     Quit date: 2000   Substance and Sexual Activity    Alcohol use: No    Drug use: No    Sexual activity: Yes     Partners: Female        MEDICATIONS:   Current Outpatient Medications:     aspirin (ECOTRIN) 81 MG EC tablet, Take 1 tablet (81 mg total) by mouth Daily., Disp: 100 tablet, Rfl: 1    azelastine (ASTELIN) 137 mcg (0.1 %) nasal spray, SPRAY 1 SPAY IN EACH NOSTRIL TWICE DAILY, Disp: 30 mL, Rfl: 2    blood sugar diagnostic Strp, 1  each by Other route., Disp: , Rfl:     empagliflozin (JARDIANCE) 25 mg tablet, Take 1 tablet (25 mg total) by mouth once daily., Disp: 90 tablet, Rfl: 1    EScitalopram oxalate (LEXAPRO) 10 MG tablet, , Disp: , Rfl:     linagliptin-metformin (JENTADUETO XR) 5-1,000 mg TBph, TAKE 1 TABLET BY MOUTH EVERY DAY, Disp: 90 each, Rfl: 2    mupirocin (BACTROBAN) 2 % ointment, Apply topically on the armpit at nighttime., Disp: 22 g, Rfl: 0    ramipriL (ALTACE) 1.25 MG capsule, TAKE 1 CAPSULE(1.25 MG) BY MOUTH EVERY DAY, Disp: 90 capsule, Rfl: 1    rosuvastatin (CRESTOR) 5 MG tablet, Take 1 tablet (5 mg total) by mouth every Monday and Thursday., Disp: 24 tablet, Rfl: 3  ALLERGIES: Review of patient's allergies indicates:  No Known Allergies      Physical Exam     Vitals:    10/20/23 1038   Resp: 18     Alert and oriented to person, place and time. No acute distress. Well-groomed, not ill appearing. Pupils round and reactive, normal respiratory effort, no audible wheezing.     On exam he has pain and irritability of the right shoulder in all planes of motion.  He has external rotation at the side 30° with pain.  He has stiffness with forward flexion and abduction to 60°.  Internal rotation to L5.        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 We discussed the patient's  current imaging as well as differential diagnosis in detail. Based on the patient's symptoms of failure of conservative treatment, I do believe an MRI of the Right Shoulder is indicated to rule out rotator cuff tear, adhesive capsulitis, biceps/labral complex injury     The patient will follow-up after MRI to discuss results and further treatment options. They will call the clinic with any questions or concerns.     Follow up: for MRI/CT Results   X-rays next visit:  None

## 2023-10-25 ENCOUNTER — HOSPITAL ENCOUNTER (OUTPATIENT)
Dept: RADIOLOGY | Facility: HOSPITAL | Age: 58
Discharge: HOME | End: 2023-10-25
Attending: ORTHOPAEDIC SURGERY
Payer: COMMERCIAL

## 2023-10-25 DIAGNOSIS — E11.618 ADHESIVE CAPSULITIS OF RIGHT SHOULDER ASSOCIATED WITH TYPE 2 DIABETES MELLITUS: ICD-10-CM

## 2023-10-25 DIAGNOSIS — M75.01 ADHESIVE CAPSULITIS OF RIGHT SHOULDER ASSOCIATED WITH TYPE 2 DIABETES MELLITUS: ICD-10-CM

## 2023-10-25 DIAGNOSIS — M25.511 ACUTE PAIN OF RIGHT SHOULDER: ICD-10-CM

## 2023-10-25 PROCEDURE — 73221 MRI JOINT UPR EXTREM W/O DYE: CPT | Mod: TC,RT

## 2023-10-25 PROCEDURE — 73221 MRI SHOULDER WITHOUT CONTRAST RIGHT: ICD-10-PCS | Mod: 26,RT,, | Performed by: RADIOLOGY

## 2023-10-25 PROCEDURE — 73221 MRI JOINT UPR EXTREM W/O DYE: CPT | Mod: 26,RT,, | Performed by: RADIOLOGY

## 2023-11-06 DIAGNOSIS — J30.1 SEASONAL ALLERGIC RHINITIS DUE TO POLLEN: ICD-10-CM

## 2023-11-06 DIAGNOSIS — E11.9 TYPE 2 DIABETES MELLITUS WITHOUT COMPLICATION, WITHOUT LONG-TERM CURRENT USE OF INSULIN: ICD-10-CM

## 2023-11-06 DIAGNOSIS — E11.9 TYPE 2 DIABETES MELLITUS WITHOUT COMPLICATION, WITHOUT LONG-TERM CURRENT USE OF INSULIN: Chronic | ICD-10-CM

## 2023-11-06 RX ORDER — SITAGLIPTIN AND METFORMIN HYDROCHLORIDE 1000; 100 MG/1; MG/1
TABLET, FILM COATED, EXTENDED RELEASE ORAL
Qty: 90 TABLET | Refills: 1 | OUTPATIENT
Start: 2023-11-06

## 2023-11-06 RX ORDER — EMPAGLIFLOZIN 25 MG/1
25 TABLET, FILM COATED ORAL
Qty: 90 TABLET | Refills: 1 | Status: SHIPPED | OUTPATIENT
Start: 2023-11-06

## 2023-11-08 RX ORDER — AZELASTINE 1 MG/ML
1 SPRAY, METERED NASAL 2 TIMES DAILY
Qty: 30 ML | Refills: 11 | Status: SHIPPED | OUTPATIENT
Start: 2023-11-08

## 2024-02-05 RX ORDER — RAMIPRIL 1.25 MG/1
CAPSULE ORAL
Qty: 90 CAPSULE | Refills: 3 | Status: SHIPPED | OUTPATIENT
Start: 2024-02-05

## 2024-02-20 DIAGNOSIS — E11.9 TYPE 2 DIABETES MELLITUS WITHOUT COMPLICATION, WITHOUT LONG-TERM CURRENT USE OF INSULIN: ICD-10-CM

## 2024-02-26 ENCOUNTER — TELEPHONE (OUTPATIENT)
Dept: FAMILY MEDICINE | Facility: CLINIC | Age: 59
End: 2024-02-26
Payer: COMMERCIAL

## 2024-02-26 DIAGNOSIS — E11.9 TYPE 2 DIABETES MELLITUS WITHOUT COMPLICATION, WITHOUT LONG-TERM CURRENT USE OF INSULIN: Chronic | ICD-10-CM

## 2024-02-26 RX ORDER — LINAGLIPTIN AND METFORMIN HYDROCHLORIDE 5; 1000 MG/1; MG/1
1 TABLET, FILM COATED, EXTENDED RELEASE ORAL DAILY
Qty: 90 EACH | Refills: 2 | Status: SHIPPED | OUTPATIENT
Start: 2024-02-26 | End: 2024-02-28

## 2024-02-26 NOTE — PROGRESS NOTES
Subjective:       Patient ID: Pedro Luis Pérez is a 58 y.o. male.    Chief Complaint: Diabetes, Hyperlipidemia, Mood Disorder, and Erectile Dysfunction    Mr. Jeramie Pérez is a 58-year-old gentleman who comes for 4-6 months follow-up.    Underlying medical issues include the following-    1.-type 2 diabetes mellitus-  2.-hyperlipidemia   3.-mood disorder  4.-Past diagnosis of COVID-19 treated with Paxlovid.    5.-Past diagnosis of right groin and flank pain  6.-Past diagnosis of kidney stone nonobstructing based upon a CT scan.  Presented with right groin pain.    Control of diabetes continues to be challenging with  hemoglobin A1c of 9.0 5 months ago.      Evidently the insurance did not cover for Jentadueto and he ran out of the medication.  They will pay for the Janumet.  Diet compliance was challenging.    Meds    Jentadueto-this is not covered by the insurance.  Jardiance      Kidney protection    Ramipril-1.25 mg.  Please note the normally his blood pressures are okay.      Lipid rosuvastatin      Past previous visits have been for axillary rash which was treated as shingles but not confirmed.  Could have been a staph infection.    Prior to that he was seen for COVID positive status.    PSA screening is normal.        Low-dose stress and anxiety has been noted.  Some degree of irritability.  He is on Lexapro for the same.    For kidney protection he takes ramipril 1.25 mg. He is also on aspirin.    For sinus problems he takes Astelin nasal spray.      Today we discussed about pneumonia vaccine, low-dose statin and shingles vaccine.    He also has some shoulder pain on the right side and would like this to be addressed.  He does not recall any clear-cut history of trauma or injury.    Hyperlipidemia  This is a chronic problem. The problem is uncontrolled. Exacerbating diseases include diabetes. Pertinent negatives include no chest pain, myalgias or shortness of breath. He is currently on no antihyperlipidemic  treatment (He is currently not on any treatment for cholesterol.). The current treatment provides mild improvement of lipids. Risk factors for coronary artery disease include male sex, a sedentary lifestyle, dyslipidemia and diabetes mellitus.   Diabetes  He presents for his follow-up diabetic visit. He has type 2 diabetes mellitus. The initial diagnosis of diabetes was made 10 years ago. His disease course has been worsening (HEMOGLOBIN A1C HAS COME GONE GREATER THAN 9. ). Pertinent negatives for hypoglycemia include no confusion, headaches, nervousness/anxiousness, pallor, seizures, speech difficulty or tremors. Pertinent negatives for diabetes include no chest pain, no fatigue, no polydipsia, no polyphagia, no polyuria and no weakness. There are no hypoglycemic complications. Risk factors for coronary artery disease include diabetes mellitus, dyslipidemia, male sex and sedentary lifestyle. Current diabetic treatment includes oral agent (triple therapy). He is compliant with treatment some of the time. When asked about meal planning, he reported none. He has not had a previous visit with a dietitian. He rarely participates in exercise. An ACE inhibitor/angiotensin II receptor blocker is being taken. He does not see a podiatrist.  Shoulder Pain   The pain is present in the right shoulder. This is a new problem. The current episode started more than 1 month ago. There has been a history of trauma. The problem occurs constantly. The problem has been unchanged. Associated symptoms include a limited range of motion and stiffness. Pertinent negatives include no fever, headaches or joint swelling. The symptoms are aggravated by activity. The treatment provided no relief. His past medical history is significant for diabetes and Injuries to Extremity. Possibly he was pulling or pushing something   Erectile Dysfunction  This is a new problem. The current episode started more than 1 month ago. The problem is unchanged. The  nature of his difficulty is maintaining erection and penetration. He reports no anxiety, decreased libido or performance anxiety. Irritative symptoms do not include frequency. Pertinent negatives include no chills, dysuria or hematuria. Past treatments include nothing. Risk factors include diabetes mellitus.       Past Medical History:   Diagnosis Date    Anxiety     Diabetes mellitus, type 2     History of colonoscopy 11/5/2020    Dr. Stephanie Morin.  Entire portion of the ileum was normal.  Nonbleeding internal hemorrhoids.  External examined colon is normal.  No specimens collected.  Repeat colonoscopy in 5 years for surveillance.     Social History     Socioeconomic History    Marital status:      Spouse name: Regla    Number of children: 4   Occupational History    Occupation: Self Employed- [employer]   Tobacco Use    Smoking status: Never    Smokeless tobacco: Former     Types: Chew     Quit date: 2000   Substance and Sexual Activity    Alcohol use: No    Drug use: No    Sexual activity: Yes     Partners: Female     Social Determinants of Health     Financial Resource Strain: Low Risk  (6/15/2023)    Overall Financial Resource Strain (CARDIA)     Difficulty of Paying Living Expenses: Not very hard   Food Insecurity: No Food Insecurity (6/15/2023)    Hunger Vital Sign     Worried About Running Out of Food in the Last Year: Never true     Ran Out of Food in the Last Year: Never true   Transportation Needs: No Transportation Needs (6/15/2023)    PRAPARE - Transportation     Lack of Transportation (Medical): No     Lack of Transportation (Non-Medical): No   Physical Activity: Inactive (6/15/2023)    Exercise Vital Sign     Days of Exercise per Week: 0 days     Minutes of Exercise per Session: 0 min   Stress: Stress Concern Present (6/15/2023)    [employer] [place] [employer] - Occupational Stress Questionnaire     Feeling of Stress : To some extent   Social Connections: Moderately Integrated  (6/15/2023)    Social Connection and Isolation Panel [NHANES]     Frequency of Communication with Friends and Family: Three times a week     Frequency of Social Gatherings with Friends and Family: Three times a week     Attends Yazidism Services: 1 to 4 times per year     Active Member of Clubs or Organizations: No     Attends Club or Organization Meetings: Never     Marital Status:    Housing Stability: Low Risk  (6/15/2023)    Housing Stability Vital Sign     Unable to Pay for Housing in the Last Year: No     Number of Places Lived in the Last Year: 1     Unstable Housing in the Last Year: No     Past Surgical History:   Procedure Laterality Date    COLONOSCOPY      x 2-3    LAPAROSCOPIC CHOLECYSTECTOMY N/A 5/10/2021    Procedure: CHOLECYSTECTOMY, LAPAROSCOPIC;  Surgeon: Gibran Lowe III, MD;  Location: [facility];  Service: General;  Laterality: N/A;     Family History   Problem Relation Age of Onset    Heart disease Mother     Diabetes Father        Review of Systems   Constitutional:  Negative for activity change, appetite change, chills, diaphoresis, fatigue, fever and unexpected weight change (gained 3 lbs.).   HENT:  Negative for congestion, ear pain, postnasal drip, sneezing, sore throat and trouble swallowing.    Eyes:  Negative for pain, discharge, redness and visual disturbance.   Respiratory:  Negative for cough, chest tightness and shortness of breath.    Cardiovascular:  Negative for chest pain and leg swelling.        Patient is on ACE inhibitor to protect his kidneys. He has normal blood pressure otherwise.   Gastrointestinal:  Negative for abdominal distention, abdominal pain, blood in stool, constipation and diarrhea.        SOME DIFFICULTY SWALLOWING FOOD AND HICCUPS.  HE WILL LOOK INTO THAT IN MORE DETAILS AND SEE HOW HE DOES.   Endocrine: Negative for cold intolerance, heat intolerance, polydipsia, polyphagia and polyuria.        Patient has mild dyslipidemia. Patient also has  "diabetes mellitus type 2.  Hemoglobin A1c is greater than 9.  Lipid panel is also un controlled.  HE WAS PROBABLY NOT TAKING HIS CHOLESTEROL MEDICATION AND A NEW PRESCRIPTION HAS BEEN SENT AT A LOWER DOSAGE.   Genitourinary:  Negative for decreased libido, dysuria, frequency, hematuria and scrotal swelling.        SYMPTOMS OF ERECTILE DYSFUNCTION.   Musculoskeletal:  Positive for stiffness. Negative for arthralgias, back pain, gait problem and myalgias.        Trigger finger left 3rd digit.  This seems to be getting better without any orthopedic or surgical intervention.   Skin:  Negative for color change, pallor, rash and wound.        SOME ZITS IN HIS RIGHT ARMPIT.   Allergic/Immunologic: Negative for environmental allergies, food allergies and immunocompromised state.   Neurological:  Negative for tremors, seizures, speech difficulty, weakness, light-headedness and headaches.   Hematological:  Negative for adenopathy. Does not bruise/bleed easily.   Psychiatric/Behavioral:  Negative for confusion and hallucinations. The patient is not nervous/anxious.         CURRENTLY PATIENT IS ON LEXAPRO.  HE GENERALLY SEEMS TO BE CONTENT BUT HE SHOULD CHECK WITH HIS WIFE IF EVERYTHING IS OKAY AND HE IS NOT AS SNIPPY OR SNAPPY AS BEFORE.         Objective:      Blood pressure 105/66, pulse 70, height 5' 8" (1.727 m), weight 71.7 kg (158 lb). Body mass index is 24.02 kg/m².  Physical Exam  Vitals and nursing note reviewed.   Constitutional:       General: He is not in acute distress.     Appearance: He is well-developed. He is not ill-appearing or diaphoretic.      Comments: BMI is 24.02   HENT:      Head: Normocephalic and atraumatic.      Mouth/Throat:      Pharynx: No posterior oropharyngeal erythema.   Eyes:      Conjunctiva/sclera: Conjunctivae normal.   Neck:      Thyroid: No thyromegaly.      Vascular: No JVD.      Trachea: No tracheal deviation.   Cardiovascular:      Rate and Rhythm: Regular rhythm.      Heart sounds: " Normal heart sounds. No murmur heard.     No friction rub. No gallop.   Pulmonary:      Effort: Pulmonary effort is normal.      Breath sounds: Normal breath sounds. No stridor.   Abdominal:      General: There is no distension.      Palpations: Abdomen is soft.      Tenderness: There is no abdominal tenderness.   Genitourinary:     Epididymis:      Right: Not inflamed.      Left: Not inflamed.      Comments: Some pain in the right groin and right hip region lingering from past but not completely gone away.  Musculoskeletal:         General: No tenderness or deformity.      Cervical back: Neck supple.      Right lower leg: No edema.      Left lower leg: No edema.      Comments: He has been getting physical therapy for the right shoulder and gradually getting better.   Feet:      Comments: Foot examination has not been done today.  Lymphadenopathy:      Cervical: No cervical adenopathy.   Skin:     General: Skin is warm and dry.      Findings: No lesion or rash.   Neurological:      Mental Status: He is alert. Mental status is at baseline.   Psychiatric:         Attention and Perception: Attention normal.         Speech: Speech normal.         Behavior: Behavior normal.      Comments: NOT OVERTLY DEPRESSED BUT PERHAPS SLIGHTLY ANHEDONIC.           Assessment:       No visits with results within 3 Month(s) from this visit.   Latest known visit with results is:   Office Visit on 06/15/2023   Component Date Value Ref Range Status    Hemoglobin A1c 09/22/2023 9.0 (H)  4.8 - 5.6 % Final    PSA - [facility] 09/22/2023 0.9  0.0 - 4.0 ng/mL Final    Creatinine, Urine 09/22/2023 45.6  Not Estab. mg/dL Final    Microalb, Ur 09/22/2023 5.2  Not Estab. ug/mL Final    Microalb/Crt. Ratio 09/22/2023 11  0 - 29 mg/g creat Final    Glucose 09/22/2023 214 (H)  70 - 99 mg/dL Final    BUN 09/22/2023 17  8 - 27 mg/dL Final    Creatinine 09/22/2023 0.74 (L)  0.76 - 1.27 mg/dL Final    eGFR 09/22/2023 104  >59 mL/min/1.73 Final     BUN/Creatinine Ratio 09/22/2023 23  10 - 24 Final    Sodium 09/22/2023 138  134 - 144 mmol/L Final    Potassium 09/22/2023 4.7  3.5 - 5.2 mmol/L Final    Chloride 09/22/2023 105  96 - 106 mmol/L Final    CO2 09/22/2023 17 (L)  20 - 29 mmol/L Final    Calcium 09/22/2023 9.4  8.6 - 10.2 mg/dL Final    Protein, Total 09/22/2023 6.6  6.0 - 8.5 g/dL Final    Albumin 09/22/2023 4.7  3.8 - 4.9 g/dL Final    Globulin, Total 09/22/2023 1.9  1.5 - 4.5 g/dL Final    Albumin/Globulin Ratio 09/22/2023 2.5 (H)  1.2 - 2.2 Final    Total Bilirubin 09/22/2023 0.5  0.0 - 1.2 mg/dL Final    Alkaline Phosphatase 09/22/2023 61  44 - 121 IU/L Final    AST 09/22/2023 20  0 - 40 IU/L Final    ALT 09/22/2023 20  0 - 44 IU/L Final       1. Type 2 diabetes mellitus without complication, without long-term current use of insulin  Overview:  Pt apparently has a diagnosis of diabetes since 7-8 years. He has been prescribed medications but he feels he should not be taking medications. He feels he can do it on his own. I do not have any prior records at this point.    Orders:  -     SITagliptan-metformin (JANUMET XR) 100-1,000 mg TM24; Take 1 tablet by mouth once daily.  Dispense: 90 tablet; Refill: 3  -     Hemoglobin A1C; Future; Expected date: 06/10/2024  -     Comprehensive Metabolic Panel; Future; Expected date: 06/10/2024  -     Microalbumin/creatinine urine ratio; Future; Expected date: 06/10/2024    2. Dyslipidemia  Comments:  Currently on rosuvastatin  Orders:  -     Comprehensive Metabolic Panel; Future; Expected date: 06/10/2024  -     Lipid Panel; Future; Expected date: 06/10/2024    3. Nephrolithiasis  Comments:  Kidney stones are not bothering him at this point.  I have advised him to remain hydrated to prevent new kidney stone formation.    4. Mood disorder  Comments:  Lexapro is doing okay.  He is happy camper.  Agrees to consider tapering off Lexapro at this point.  Let us see how he does.    5. Erectile dysfunction, unspecified  erectile dysfunction type  -     sildenafiL (VIAGRA) 50 MG tablet; Take 1 tablet (50 mg total) by mouth daily as needed for Erectile Dysfunction.  Dispense: 10 tablet; Refill: 5           2 Result Notes       1 Patient Communication       1 HM Topic             Component Ref Range & Units 5 mo ago  (9/22/23) 10 mo ago  (4/26/23) 1 yr ago  (6/28/22) 2 yr ago  (1/4/22) 3 yr ago  (11/5/20) 4 yr ago  (9/6/19) 4 yr ago  (4/24/19)   Hemoglobin A1c 4.8 - 5.6 % 9.0 High  8.8 High  CM 9.2 High  CM 8.3 High  CM 8.6 High  CM 7.8 High  CM 7.3 High          Plan:   Type 2 diabetes mellitus without complication, without long-term current use of insulin  -     SITagliptan-metformin (JANUMET XR) 100-1,000 mg TM24; Take 1 tablet by mouth once daily.  Dispense: 90 tablet; Refill: 3  -     Hemoglobin A1C; Future; Expected date: 06/10/2024  -     Comprehensive Metabolic Panel; Future; Expected date: 06/10/2024  -     Microalbumin/creatinine urine ratio; Future; Expected date: 06/10/2024    Dyslipidemia  Comments:  Currently on rosuvastatin  Orders:  -     Comprehensive Metabolic Panel; Future; Expected date: 06/10/2024  -     Lipid Panel; Future; Expected date: 06/10/2024    Nephrolithiasis  Comments:  Kidney stones are not bothering him at this point.  I have advised him to remain hydrated to prevent new kidney stone formation.    Mood disorder  Comments:  Lexapro is doing okay.  He is happy camper.  Agrees to consider tapering off Lexapro at this point.  Let us see how he does.    Erectile dysfunction, unspecified erectile dysfunction type  -     sildenafiL (VIAGRA) 50 MG tablet; Take 1 tablet (50 mg total) by mouth daily as needed for Erectile Dysfunction.  Dispense: 10 tablet; Refill: 5    Overall Jeramie is doing okay.      His right shoulder is getting better with physical therapy.    A heavy weight had fallen on his left foot and he was not recovered with the pain.  I have recommend him range of motion and if he wants me to refer  him to Podiatry I will be happy to do so.      Generally he seems to be happy camper in life and he is willing to consider tapering off Lexapro at this point.  If he does so, take it 3 times a week for 2 weeks and then twice a week for 2 weeks and see how he does.  If he gets excessively snappy or colmenares, he should go back on it.  His wife will be able to tell.      New symptoms of erectile dysfunction has been discussed.  He does get arousal, erection but is unable to maintain it.    I will give a trial of Viagra 50 mg.  Side effects would include headaches, nasal stuffiness, rarely blue vision and some sinking sensation.  If he is any chest pains, he should stop using the medication.  Very very rarely and very unusually there is a side effect called permanent erection in penis which can be painful and it was called priapism.  This could be an emergency.  He can start with half to see if there is any side effects.      Labs will be ordered for the next visit.    Discussed about flu shot and pneumonia vaccines and he feels that he had got a flu shot though our records do not indicate that.  Next time will continue the discussion concerning these.    Advised Pt about age and season appropriate immunizations/ cancer screenings.  Also seasonal influenza vaccine, update on tetanus diphtheria vaccination every 10 years.  Patient has been advised to watch diet and exercise. Avoid fried and fatty food. Compliance to medications and follow up urged.  Advised Pt. to monitor Blood sugars at home and record them.  Advised Pt  for Anti reflux measures like small feequent meals, avoid spicy and greasy food. Head end up at night.  keep a close eye on feet and keep them clean. Annual eye examination. Annual influenza vaccine.  Monitor HgbA1c every 3 to 6 months. Monitor urine microalbumin every year.keep LDL less than 100. Monitor blood pressure and target blood pressure 120/70.  Please utilize precautions for current COVID-19  pandemic.  Try to avoid crowds or close contact with multiple people.  Minimize outside interaction.  Wash hands with soap for  frequently upon contact.Use face mask or cover.    Fup    Spent yamileth 35 minutes with patient which involved review of pts medical conditions, labs, medications and with 50% of time face-to-face discussion about medical problems, management and any applicable changes.    Follow up in about 4 months (around 6/28/2024), or if symptoms worsen or fail to improve, for Diabetes//Lipids.      Current Outpatient Medications:     aspirin (ECOTRIN) 81 MG EC tablet, Take 1 tablet (81 mg total) by mouth Daily., Disp: 100 tablet, Rfl: 1    azelastine (ASTELIN) 137 mcg (0.1 %) nasal spray, 1 spray (137 mcg total) by Nasal route 2 (two) times daily., Disp: 30 mL, Rfl: 11    blood sugar diagnostic Strp, 1 each by Other route., Disp: , Rfl:     EScitalopram oxalate (LEXAPRO) 10 MG tablet, , Disp: , Rfl:     JARDIANCE 25 mg tablet, TAKE 1 TABLET(25 MG) BY MOUTH EVERY DAY, Disp: 90 tablet, Rfl: 1    mupirocin (BACTROBAN) 2 % ointment, Apply topically on the armpit at nighttime., Disp: 22 g, Rfl: 0    ramipriL (ALTACE) 1.25 MG capsule, TAKE 1 CAPSULE(1.25 MG) BY MOUTH EVERY DAY, Disp: 90 capsule, Rfl: 3    rosuvastatin (CRESTOR) 5 MG tablet, Take 1 tablet (5 mg total) by mouth every Monday and Thursday., Disp: 24 tablet, Rfl: 3    sildenafiL (VIAGRA) 50 MG tablet, Take 1 tablet (50 mg total) by mouth daily as needed for Erectile Dysfunction., Disp: 10 tablet, Rfl: 5    SITagliptan-metformin (JANUMET XR) 100-1,000 mg TM24, Take 1 tablet by mouth once daily., Disp: 90 tablet, Rfl: 3    Alan Pat

## 2024-02-26 NOTE — TELEPHONE ENCOUNTER
Pt called states insurance will not pat for the jentadue xr but will pay for the Janumet.  Csn you send him a rx for the janumet. He has not had any medication in over a week.

## 2024-02-28 ENCOUNTER — PATIENT MESSAGE (OUTPATIENT)
Dept: FAMILY MEDICINE | Facility: CLINIC | Age: 59
End: 2024-02-28

## 2024-02-28 ENCOUNTER — OFFICE VISIT (OUTPATIENT)
Dept: FAMILY MEDICINE | Facility: CLINIC | Age: 59
End: 2024-02-28
Payer: COMMERCIAL

## 2024-02-28 VITALS
WEIGHT: 158 LBS | DIASTOLIC BLOOD PRESSURE: 66 MMHG | BODY MASS INDEX: 23.95 KG/M2 | HEIGHT: 68 IN | HEART RATE: 70 BPM | SYSTOLIC BLOOD PRESSURE: 105 MMHG

## 2024-02-28 DIAGNOSIS — N20.0 NEPHROLITHIASIS: ICD-10-CM

## 2024-02-28 DIAGNOSIS — N52.9 ERECTILE DYSFUNCTION, UNSPECIFIED ERECTILE DYSFUNCTION TYPE: ICD-10-CM

## 2024-02-28 DIAGNOSIS — E78.5 DYSLIPIDEMIA: Chronic | ICD-10-CM

## 2024-02-28 DIAGNOSIS — E11.9 TYPE 2 DIABETES MELLITUS WITHOUT COMPLICATION, WITHOUT LONG-TERM CURRENT USE OF INSULIN: Primary | ICD-10-CM

## 2024-02-28 DIAGNOSIS — F39 MOOD DISORDER: Chronic | ICD-10-CM

## 2024-02-28 PROCEDURE — 1159F MED LIST DOCD IN RCRD: CPT | Mod: CPTII,S$GLB,, | Performed by: INTERNAL MEDICINE

## 2024-02-28 PROCEDURE — 99214 OFFICE O/P EST MOD 30 MIN: CPT | Mod: S$GLB,,, | Performed by: INTERNAL MEDICINE

## 2024-02-28 PROCEDURE — 3074F SYST BP LT 130 MM HG: CPT | Mod: CPTII,S$GLB,, | Performed by: INTERNAL MEDICINE

## 2024-02-28 PROCEDURE — 4010F ACE/ARB THERAPY RXD/TAKEN: CPT | Mod: CPTII,S$GLB,, | Performed by: INTERNAL MEDICINE

## 2024-02-28 PROCEDURE — 99999 PR PBB SHADOW E&M-EST. PATIENT-LVL III: CPT | Mod: PBBFAC,,, | Performed by: INTERNAL MEDICINE

## 2024-02-28 PROCEDURE — 3078F DIAST BP <80 MM HG: CPT | Mod: CPTII,S$GLB,, | Performed by: INTERNAL MEDICINE

## 2024-02-28 PROCEDURE — 3008F BODY MASS INDEX DOCD: CPT | Mod: CPTII,S$GLB,, | Performed by: INTERNAL MEDICINE

## 2024-02-28 PROCEDURE — 1160F RVW MEDS BY RX/DR IN RCRD: CPT | Mod: CPTII,S$GLB,, | Performed by: INTERNAL MEDICINE

## 2024-02-28 RX ORDER — SITAGLIPTIN AND METFORMIN HYDROCHLORIDE 1000; 100 MG/1; MG/1
1 TABLET, FILM COATED, EXTENDED RELEASE ORAL DAILY
Qty: 90 TABLET | Refills: 3 | Status: SHIPPED | OUTPATIENT
Start: 2024-02-28 | End: 2024-03-19 | Stop reason: SDUPTHER

## 2024-02-28 RX ORDER — SILDENAFIL 50 MG/1
50 TABLET, FILM COATED ORAL DAILY PRN
Qty: 10 TABLET | Refills: 5 | Status: SHIPPED | OUTPATIENT
Start: 2024-02-28 | End: 2025-02-27

## 2024-03-19 RX ORDER — SITAGLIPTIN AND METFORMIN HYDROCHLORIDE 1000; 100 MG/1; MG/1
TABLET, FILM COATED, EXTENDED RELEASE ORAL
Qty: 90 TABLET | Refills: 1 | Status: SHIPPED | OUTPATIENT
Start: 2024-03-19

## 2024-05-04 DIAGNOSIS — E11.9 TYPE 2 DIABETES MELLITUS WITHOUT COMPLICATION, WITHOUT LONG-TERM CURRENT USE OF INSULIN: Chronic | ICD-10-CM

## 2024-05-06 ENCOUNTER — PATIENT MESSAGE (OUTPATIENT)
Dept: FAMILY MEDICINE | Facility: CLINIC | Age: 59
End: 2024-05-06
Payer: COMMERCIAL

## 2024-05-06 RX ORDER — EMPAGLIFLOZIN 25 MG/1
25 TABLET, FILM COATED ORAL
Qty: 90 TABLET | Refills: 1 | Status: SHIPPED | OUTPATIENT
Start: 2024-05-06

## 2024-07-26 ENCOUNTER — PATIENT MESSAGE (OUTPATIENT)
Dept: ADMINISTRATIVE | Facility: HOSPITAL | Age: 59
End: 2024-07-26
Payer: COMMERCIAL

## 2024-08-15 DIAGNOSIS — E11.9 TYPE 2 DIABETES MELLITUS WITHOUT COMPLICATION, WITHOUT LONG-TERM CURRENT USE OF INSULIN: Chronic | ICD-10-CM

## 2024-08-15 DIAGNOSIS — E78.5 DYSLIPIDEMIA: ICD-10-CM

## 2024-08-15 RX ORDER — ROSUVASTATIN CALCIUM 5 MG/1
TABLET, COATED ORAL
Qty: 24 TABLET | Refills: 3 | Status: SHIPPED | OUTPATIENT
Start: 2024-08-15

## 2024-08-28 ENCOUNTER — OFFICE VISIT (OUTPATIENT)
Dept: FAMILY MEDICINE | Facility: CLINIC | Age: 59
End: 2024-08-28
Payer: COMMERCIAL

## 2024-08-28 ENCOUNTER — PATIENT MESSAGE (OUTPATIENT)
Dept: FAMILY MEDICINE | Facility: CLINIC | Age: 59
End: 2024-08-28

## 2024-08-28 VITALS
OXYGEN SATURATION: 96 % | HEART RATE: 85 BPM | DIASTOLIC BLOOD PRESSURE: 60 MMHG | WEIGHT: 161.19 LBS | HEIGHT: 68 IN | SYSTOLIC BLOOD PRESSURE: 104 MMHG | TEMPERATURE: 98 F | BODY MASS INDEX: 24.43 KG/M2

## 2024-08-28 DIAGNOSIS — E11.9 TYPE 2 DIABETES MELLITUS WITHOUT COMPLICATION, WITHOUT LONG-TERM CURRENT USE OF INSULIN: Primary | ICD-10-CM

## 2024-08-28 DIAGNOSIS — E78.5 DYSLIPIDEMIA: ICD-10-CM

## 2024-08-28 DIAGNOSIS — Z12.5 SCREENING FOR PROSTATE CANCER: ICD-10-CM

## 2024-08-28 PROCEDURE — 99999 PR PBB SHADOW E&M-EST. PATIENT-LVL IV: CPT | Mod: PBBFAC,,, | Performed by: INTERNAL MEDICINE

## 2024-08-28 PROCEDURE — 3078F DIAST BP <80 MM HG: CPT | Mod: CPTII,S$GLB,, | Performed by: INTERNAL MEDICINE

## 2024-08-28 PROCEDURE — 4010F ACE/ARB THERAPY RXD/TAKEN: CPT | Mod: CPTII,S$GLB,, | Performed by: INTERNAL MEDICINE

## 2024-08-28 PROCEDURE — 3066F NEPHROPATHY DOC TX: CPT | Mod: CPTII,S$GLB,, | Performed by: INTERNAL MEDICINE

## 2024-08-28 PROCEDURE — 1159F MED LIST DOCD IN RCRD: CPT | Mod: CPTII,S$GLB,, | Performed by: INTERNAL MEDICINE

## 2024-08-28 PROCEDURE — 3046F HEMOGLOBIN A1C LEVEL >9.0%: CPT | Mod: CPTII,S$GLB,, | Performed by: INTERNAL MEDICINE

## 2024-08-28 PROCEDURE — 1160F RVW MEDS BY RX/DR IN RCRD: CPT | Mod: CPTII,S$GLB,, | Performed by: INTERNAL MEDICINE

## 2024-08-28 PROCEDURE — 3061F NEG MICROALBUMINURIA REV: CPT | Mod: CPTII,S$GLB,, | Performed by: INTERNAL MEDICINE

## 2024-08-28 PROCEDURE — 3074F SYST BP LT 130 MM HG: CPT | Mod: CPTII,S$GLB,, | Performed by: INTERNAL MEDICINE

## 2024-08-28 PROCEDURE — 99213 OFFICE O/P EST LOW 20 MIN: CPT | Mod: S$GLB,,, | Performed by: INTERNAL MEDICINE

## 2024-08-28 PROCEDURE — 3008F BODY MASS INDEX DOCD: CPT | Mod: CPTII,S$GLB,, | Performed by: INTERNAL MEDICINE

## 2024-08-28 RX ORDER — SITAGLIPTIN AND METFORMIN HYDROCHLORIDE 1000; 100 MG/1; MG/1
1 TABLET, FILM COATED, EXTENDED RELEASE ORAL DAILY
Qty: 90 TABLET | Refills: 1 | Status: CANCELLED | OUTPATIENT
Start: 2024-08-28

## 2024-08-28 RX ORDER — METFORMIN HYDROCHLORIDE 1000 MG/1
1000 TABLET ORAL 2 TIMES DAILY WITH MEALS
Qty: 90 TABLET | Refills: 3 | Status: SHIPPED | OUTPATIENT
Start: 2024-08-28 | End: 2025-03-04

## 2024-08-28 RX ORDER — ORAL SEMAGLUTIDE 3 MG/1
3 TABLET ORAL DAILY
Qty: 30 TABLET | Refills: 1 | Status: SHIPPED | OUTPATIENT
Start: 2024-08-28

## 2024-08-28 NOTE — PROGRESS NOTES
Subjective:       Patient ID: Pedro Luis Pérez is a 58 y.o. male.    Chief Complaint: Hyperlipidemia and Diabetes    Jeramie is a 58-year-old gentleman who comes for follow-up.  His blood sugars are going up.  Loves his nuts salted sweet peanut whatever and  all kind.    Currently he is on Jardiance 25 and Janumet 100/1000 every 24 hours.    In spite of that his sugars are going up.  He was gained 4 lb since the last time.  He was not heavy to begin with but any weight gain in this gentleman will worsen his diabetes.    He was given a prescription for sildenafil or Viagra last time.  However his wife likes him without the Viagra then with Viagra.  Hence he did not fill this prescription     He has heard from his friend that Rybelsus at 14 mg works good.  He would like to try that medication to control his diabetes.  We had a discussion about this medication    Hyperlipidemia  This is a chronic problem. The problem is uncontrolled. Pertinent negatives include no chest pain, myalgias or shortness of breath. He is currently on no antihyperlipidemic treatment (He is currently not on any treatment for cholesterol.). The current treatment provides mild improvement of lipids. Risk factors for coronary artery disease include male sex, a sedentary lifestyle, dyslipidemia and diabetes mellitus.   Diabetes  He presents for his follow-up diabetic visit. He has type 2 diabetes mellitus. The initial diagnosis of diabetes was made 10 years ago. His disease course has been worsening (HEMOGLOBIN A1C HAS COME GONE GREATER THAN 9. ). Pertinent negatives for hypoglycemia include no confusion, pallor, seizures, speech difficulty or tremors. Pertinent negatives for diabetes include no chest pain, no fatigue, no polydipsia, no polyphagia, no polyuria and no weakness. There are no hypoglycemic complications. Risk factors for coronary artery disease include diabetes mellitus, dyslipidemia, male sex and sedentary lifestyle. Current diabetic  treatment includes oral agent (triple therapy). He is compliant with treatment some of the time. When asked about meal planning, he reported none. He has not had a previous visit with a dietitian. He rarely participates in exercise. An ACE inhibitor/angiotensin II receptor blocker is being taken. He does not see a podiatrist.      Past Medical History:   Diagnosis Date    Anxiety     Diabetes mellitus, type 2     History of colonoscopy 11/5/2020    Dr. Stephanie Morin.  Entire portion of the ileum was normal.  Nonbleeding internal hemorrhoids.  External examined colon is normal.  No specimens collected.  Repeat colonoscopy in 5 years for surveillance.     Social History     Socioeconomic History    Marital status:      Spouse name: Regla    Number of children: 4   Occupational History    Occupation: Self Employed- [employer]   Tobacco Use    Smoking status: Never    Smokeless tobacco: Former     Types: Chew     Quit date: 2000   Substance and Sexual Activity    Alcohol use: No    Drug use: No    Sexual activity: Yes     Partners: Female     Social Determinants of Health     Financial Resource Strain: Low Risk  (6/15/2023)    Overall Financial Resource Strain (CARDIA)     Difficulty of Paying Living Expenses: Not very hard   Food Insecurity: No Food Insecurity (6/15/2023)    Hunger Vital Sign     Worried About Running Out of Food in the Last Year: Never true     Ran Out of Food in the Last Year: Never true   Transportation Needs: No Transportation Needs (6/15/2023)    PRAPARE - Transportation     Lack of Transportation (Medical): No     Lack of Transportation (Non-Medical): No   Physical Activity: Inactive (6/15/2023)    Exercise Vital Sign     Days of Exercise per Week: 0 days     Minutes of Exercise per Session: 0 min   Stress: Stress Concern Present (6/15/2023)    [employer] - Occupational Stress Questionnaire     Feeling of Stress : To some extent   Housing Stability: Low  "Risk  (6/15/2023)    Housing Stability Vital Sign     Unable to Pay for Housing in the Last Year: No     Number of Places Lived in the Last Year: 1     Unstable Housing in the Last Year: No     Past Surgical History:   Procedure Laterality Date    COLONOSCOPY      x 2-3    LAPAROSCOPIC CHOLECYSTECTOMY N/A 5/10/2021    Procedure: CHOLECYSTECTOMY, LAPAROSCOPIC;  Surgeon: Gibran Lowe III, MD;  Location: [facility];  Service: General;  Laterality: N/A;     Family History   Problem Relation Name Age of Onset    Heart disease Mother      Diabetes Father         Review of Systems   Constitutional:  Negative for activity change, chills and fatigue. Unexpected weight change: gained weight.  Respiratory:  Negative for apnea, chest tightness and shortness of breath.    Cardiovascular:  Negative for chest pain and leg swelling.   Gastrointestinal:  Negative for abdominal distention, blood in stool and nausea.   Endocrine: Negative for cold intolerance, polydipsia, polyphagia and polyuria.         Somewhat poorly-controlled diabetes type 2   Genitourinary:  Negative for difficulty urinating and penile discharge.   Musculoskeletal:  Negative for myalgias.   Skin:  Negative for pallor.   Neurological:  Negative for tremors, seizures, speech difficulty and weakness.   Psychiatric/Behavioral:  Negative for agitation, confusion and hallucinations.          Objective:      Blood pressure 104/60, pulse 85, temperature 98.4 °F (36.9 °C), temperature source Oral, height 5' 8" (1.727 m), weight 73.1 kg (161 lb 3.2 oz), SpO2 96%. Body mass index is 24.51 kg/m².  Physical Exam  Vitals and nursing note reviewed.   Constitutional:       General: He is not in acute distress.     Appearance: He is well-developed. He is not ill-appearing or diaphoretic.      Comments: BMI is  24.51   HENT:      Head: Normocephalic and atraumatic.      Mouth/Throat:      Pharynx: No posterior oropharyngeal erythema.   Eyes:      Conjunctiva/sclera: " Conjunctivae normal.   Neck:      Thyroid: No thyromegaly.      Vascular: No JVD.      Trachea: No tracheal deviation.   Cardiovascular:      Rate and Rhythm: Regular rhythm.      Pulses:           Dorsalis pedis pulses are 1+ on the right side and 1+ on the left side.      Heart sounds: Normal heart sounds. No murmur heard.     No friction rub. No gallop.   Pulmonary:      Effort: Pulmonary effort is normal.      Breath sounds: Normal breath sounds. No stridor.   Abdominal:      General: There is no distension.      Palpations: Abdomen is soft.      Tenderness: There is no abdominal tenderness.   Genitourinary:     Epididymis:      Right: Not inflamed.   Musculoskeletal:         General: No tenderness or deformity.      Cervical back: Neck supple.      Right lower leg: No edema.      Left lower leg: No edema.      Right foot: Normal range of motion. No deformity.      Left foot: Normal range of motion. No deformity.   Feet:      Right foot:      Protective Sensation: 5 sites tested.  5 sites sensed.      Skin integrity: No ulcer or blister.      Toenail Condition: Right toenails are normal.      Left foot:      Protective Sensation: 5 sites tested.  5 sites sensed.      Skin integrity: No ulcer or blister.      Comments: A small subungual hematoma in the left toenail which is getting better.  He dropped some heavy equipment or item on this.  Please see the picture.  Lymphadenopathy:      Cervical: No cervical adenopathy.   Skin:     General: Skin is warm and dry.      Findings: No lesion or rash.   Neurological:      Mental Status: He is alert. Mental status is at baseline.   Psychiatric:         Attention and Perception: Attention normal.         Speech: Speech normal.         Behavior: Behavior normal.      Comments: NOT OVERTLY DEPRESSED BUT PERHAPS SLIGHTLY ANHEDONIC.           Assessment:       No visits with results within 3 Month(s) from this visit.   Latest known visit with results is:   Office Visit on  02/28/2024   Component Date Value Ref Range Status    Hemoglobin A1c 08/26/2024 9.2 (H)  4.8 - 5.6 % Final    Glucose 08/26/2024 234 (H)  70 - 99 mg/dL Final    BUN 08/26/2024 16  8 - 27 mg/dL Final    Creatinine 08/26/2024 0.81  0.76 - 1.27 mg/dL Final    eGFR 08/26/2024 101  >59 mL/min/1.73 Final    BUN/Creatinine Ratio 08/26/2024 20  10 - 24 Final    Sodium 08/26/2024 138  134 - 144 mmol/L Final    Potassium 08/26/2024 4.6  3.5 - 5.2 mmol/L Final    Chloride 08/26/2024 102  96 - 106 mmol/L Final    CO2 08/26/2024 21  20 - 29 mmol/L Final    Calcium 08/26/2024 9.4  8.6 - 10.2 mg/dL Final    Protein, Total 08/26/2024 6.5  6.0 - 8.5 g/dL Final    Albumin 08/26/2024 4.5  3.8 - 4.9 g/dL Final    Globulin, Total 08/26/2024 2.0  1.5 - 4.5 g/dL Final    Total Bilirubin 08/26/2024 0.5  0.0 - 1.2 mg/dL Final    Alkaline Phosphatase 08/26/2024 74  44 - 121 IU/L Final    AST 08/26/2024 17  0 - 40 IU/L Final    ALT 08/26/2024 21  0 - 44 IU/L Final    Creatinine, Urine 08/26/2024 44.1  Not Estab. mg/dL Final    Microalb, Ur 08/26/2024 <3.0  Not Estab. ug/mL Final    Microalb/Crt. Ratio 08/26/2024 <7  0 - 29 mg/g creat Final    Cholesterol 08/26/2024 188  100 - 199 mg/dL Final    Triglycerides 08/26/2024 153 (H)  0 - 149 mg/dL Final    HDL 08/26/2024 38 (L)  >39 mg/dL Final    VLDL Cholesterol Blaze 08/26/2024 27  5 - 40 mg/dL Final    LDL Calculated 08/26/2024 123 (H)  0 - 99 mg/dL Final     Component Ref Range & Units 2 d ago  (8/26/24) 11 mo ago  (9/22/23) 1 yr ago  (4/26/23) 2 yr ago  (6/28/22) 2 yr ago  (1/4/22) 3 yr ago  (11/5/20) 4 yr ago  (9/6/19)   Hemoglobin A1c 4.8 - 5.6 % 9.2 High  9.0 High  CM 8.8 High  CM 9.2 High  CM 8.3 High  CM 8.6 High  CM 7.8 High          1. Type 2 diabetes mellitus without complication, without long-term current use of insulin  Comments:  Discontinue Janumet.  Trial of Rybelsus at 3 mg and will increase it to 7 mg next month.  Check labs in 3 months.  Overview:  Pt apparently has a diagnosis  of diabetes since 7-8 years. He has been prescribed medications but he feels he should not be taking medications. He feels he can do it on his own. I do not have any prior records at this point.    Orders:  -     semaglutide (RYBELSUS) 3 mg tablet; Take 1 tablet (3 mg total) by mouth once daily.  Dispense: 30 tablet; Refill: 1  -     metFORMIN (GLUCOPHAGE) 1000 MG tablet; Take 1 tablet (1,000 mg total) by mouth 2 (two) times daily with meals.  Dispense: 90 tablet; Refill: 3  -     Hemoglobin A1C; Future; Expected date: 11/04/2024  -     Basic Metabolic Panel; Future; Expected date: 11/04/2024    2. Dyslipidemia  Comments:  Currently on rosuvastatin twice a week.  Please continue it.  Please do not stop it.  We need to get your cholesterol further down.    3. Screening for prostate cancer  -     PSA, SCREENING; Future; Expected date: 11/04/2024    Other orders  -     Discontinue: pneumoc 20-mahamed conj-dip cr(PF) (PREVNAR-20 (PF)) injection Syrg 0.5 mL           Component Ref Range & Units 2 yr ago   PSA - [facility] 0.0 - 4.0 ng/mL 0.8               Plan:   Type 2 diabetes mellitus without complication, without long-term current use of insulin  Comments:  Discontinue Janumet.  Trial of Rybelsus at 3 mg and will increase it to 7 mg next month.  Check labs in 3 months.  Orders:  -     semaglutide (RYBELSUS) 3 mg tablet; Take 1 tablet (3 mg total) by mouth once daily.  Dispense: 30 tablet; Refill: 1  -     metFORMIN (GLUCOPHAGE) 1000 MG tablet; Take 1 tablet (1,000 mg total) by mouth 2 (two) times daily with meals.  Dispense: 90 tablet; Refill: 3  -     Hemoglobin A1C; Future; Expected date: 11/04/2024  -     Basic Metabolic Panel; Future; Expected date: 11/04/2024    Dyslipidemia  Comments:  Currently on rosuvastatin twice a week.  Please continue it.  Please do not stop it.  We need to get your cholesterol further down.    Screening for prostate cancer  -     PSA, SCREENING; Future; Expected date: 11/04/2024    Other  orders  -     Discontinue: pneumoc 20-mahamed conj-dip cr(PF) (PREVNAR-20 (PF)) injection Syrg 0.5 mL      Will add Rybelsus to the regimen as per patient's request.    Discontinue Janumet because of the Januvia component which will create a therapeutic Class duplication with DPP 4 and GLP 1.  Restrict to plain metformin.      I have advised the patient the Rybelsus will be started at a lower dosage of 3 mg to check for tolerance.  Gradually we can increase to 7 mg and if needed 14 mg.  This medication works by causing decrease in hunger and also stimulating the GLP 1 receptors which produce insulin and decreases glucagon production.      Continue to watch diet.          Follow up in about 3 months (around 12/9/2024), or if symptoms worsen or fail to improve, for Diabetes/HTN/Lipids.      Current Outpatient Medications:     aspirin (ECOTRIN) 81 MG EC tablet, Take 1 tablet (81 mg total) by mouth Daily., Disp: 100 tablet, Rfl: 1    azelastine (ASTELIN) 137 mcg (0.1 %) nasal spray, 1 spray (137 mcg total) by Nasal route 2 (two) times daily., Disp: 30 mL, Rfl: 11    blood sugar diagnostic Strp, 1 each by Other route., Disp: , Rfl:     EScitalopram oxalate (LEXAPRO) 10 MG tablet, , Disp: , Rfl:     JARDIANCE 25 mg tablet, TAKE 1 TABLET(25 MG) BY MOUTH EVERY DAY, Disp: 90 tablet, Rfl: 1    ramipriL (ALTACE) 1.25 MG capsule, TAKE 1 CAPSULE(1.25 MG) BY MOUTH EVERY DAY, Disp: 90 capsule, Rfl: 3    metFORMIN (GLUCOPHAGE) 1000 MG tablet, Take 1 tablet (1,000 mg total) by mouth 2 (two) times daily with meals., Disp: 90 tablet, Rfl: 3    mupirocin (BACTROBAN) 2 % ointment, Apply topically on the armpit at nighttime. (Patient not taking: Reported on 8/28/2024), Disp: 22 g, Rfl: 0    rosuvastatin (CRESTOR) 5 MG tablet, TAKE 1 TABLET BY MOUTH EVERY MONDAY AND THURSDAY (Patient not taking: Reported on 8/28/2024), Disp: 24 tablet, Rfl: 3    semaglutide (RYBELSUS) 3 mg tablet, Take 1 tablet (3 mg total) by mouth once daily., Disp: 30  tablet, Rfl: 1    sildenafiL (VIAGRA) 50 MG tablet, Take 1 tablet (50 mg total) by mouth daily as needed for Erectile Dysfunction. (Patient not taking: Reported on 8/28/2024), Disp: 10 tablet, Rfl: 5  No current facility-administered medications for this visit.    Alan Stewart     ATTEMPT WAS MADE TO GIVE PNEUMONIA VACCINE TODAY BUT IT WAS NOT AVAILABLE.

## 2024-08-29 ENCOUNTER — TELEPHONE (OUTPATIENT)
Dept: FAMILY MEDICINE | Facility: CLINIC | Age: 59
End: 2024-08-29
Payer: COMMERCIAL

## 2024-08-29 NOTE — TELEPHONE ENCOUNTER
----- Message from Taylor Churchill sent at 8/29/2024 10:54 AM CDT -----  The patient needs a refill on Semaglutide.  [facility] in [place] pt's # [phone] GH

## 2024-08-29 NOTE — TELEPHONE ENCOUNTER
Maria Luisa PACHECO Approved    Prior authorization approved  Payer: [facility] [employer]  - Commercial Case ID: [id number]    [phone]  Note from payer: Your PA request has been approved. Additional information will be provided in the approval communication. (Message 1147)  Approval Details    Authorized from August 29, 2024 to August 29, 2027  Electronic appeal: Not supported

## 2024-09-03 ENCOUNTER — PATIENT MESSAGE (OUTPATIENT)
Dept: ADMINISTRATIVE | Facility: HOSPITAL | Age: 59
End: 2024-09-03
Payer: COMMERCIAL

## 2024-10-16 ENCOUNTER — PATIENT MESSAGE (OUTPATIENT)
Dept: FAMILY MEDICINE | Facility: CLINIC | Age: 59
End: 2024-10-16
Payer: COMMERCIAL

## 2024-10-16 DIAGNOSIS — E11.9 TYPE 2 DIABETES MELLITUS WITHOUT COMPLICATION, WITHOUT LONG-TERM CURRENT USE OF INSULIN: Primary | ICD-10-CM

## 2024-10-18 RX ORDER — ORAL SEMAGLUTIDE 7 MG/1
7 TABLET ORAL DAILY
Qty: 30 TABLET | Refills: 5 | Status: SHIPPED | OUTPATIENT
Start: 2024-10-18

## 2024-10-19 NOTE — TELEPHONE ENCOUNTER
Dosage of Rybelsus has now been increased to 7 mg and sent to your local pharmacy.  Let me know accordingly.      Dr. Pat BAUTISTA

## 2024-11-02 DIAGNOSIS — E11.9 TYPE 2 DIABETES MELLITUS WITHOUT COMPLICATION, WITHOUT LONG-TERM CURRENT USE OF INSULIN: Chronic | ICD-10-CM

## 2024-11-04 DIAGNOSIS — E11.9 TYPE 2 DIABETES MELLITUS WITHOUT COMPLICATION, WITHOUT LONG-TERM CURRENT USE OF INSULIN: Chronic | ICD-10-CM

## 2024-11-05 RX ORDER — EMPAGLIFLOZIN 25 MG/1
25 TABLET, FILM COATED ORAL
Qty: 90 TABLET | Refills: 1 | Status: SHIPPED | OUTPATIENT
Start: 2024-11-05

## 2024-11-21 DIAGNOSIS — E11.9 TYPE 2 DIABETES MELLITUS WITHOUT COMPLICATION, WITHOUT LONG-TERM CURRENT USE OF INSULIN: ICD-10-CM

## 2024-11-24 RX ORDER — ORAL SEMAGLUTIDE 7 MG/1
7 TABLET ORAL DAILY
Qty: 30 TABLET | Refills: 5 | Status: SHIPPED | OUTPATIENT
Start: 2024-11-24

## 2025-02-02 DIAGNOSIS — E11.9 TYPE 2 DIABETES MELLITUS WITHOUT COMPLICATION, WITHOUT LONG-TERM CURRENT USE OF INSULIN: Chronic | ICD-10-CM

## 2025-02-03 RX ORDER — EMPAGLIFLOZIN 25 MG/1
TABLET, FILM COATED ORAL
Qty: 90 TABLET | Refills: 1 | Status: SHIPPED | OUTPATIENT
Start: 2025-02-03

## 2025-02-17 ENCOUNTER — PATIENT MESSAGE (OUTPATIENT)
Dept: ADMINISTRATIVE | Facility: HOSPITAL | Age: 60
End: 2025-02-17
Payer: COMMERCIAL

## 2025-03-10 ENCOUNTER — PATIENT MESSAGE (OUTPATIENT)
Dept: ADMINISTRATIVE | Facility: HOSPITAL | Age: 60
End: 2025-03-10
Payer: COMMERCIAL

## 2025-03-17 DIAGNOSIS — E11.9 TYPE 2 DIABETES MELLITUS WITHOUT COMPLICATION, WITHOUT LONG-TERM CURRENT USE OF INSULIN: ICD-10-CM

## 2025-03-17 RX ORDER — METFORMIN HYDROCHLORIDE 1000 MG/1
1000 TABLET ORAL 2 TIMES DAILY WITH MEALS
Qty: 180 TABLET | Refills: 3 | Status: SHIPPED | OUTPATIENT
Start: 2025-03-17 | End: 2026-03-17

## 2025-03-17 NOTE — TELEPHONE ENCOUNTER
Refill Routing Note   Medication(s) are not appropriate for processing by [facility] for the following reason(s):        Required labs outdated    [facility] action(s):  Defer   Requires labs : Yes             Appointments  past 12m or future 3m with PCP    Date Provider   Last Visit   8/28/2024 Alan Stewart MD   Next Visit   4/14/2025 Alan Stewart MD   ED visits in past 90 days: 0        Note composed:7:38 AM 03/17/2025

## 2025-03-17 NOTE — TELEPHONE ENCOUNTER
Care Due:                  Date            Visit Type   Department     Provider  --------------------------------------------------------------------------------                                             [facility]   [address]  Last Visit: 08-      PATIENT      FAMILY Micah Stewart                               -         [facility] [address]  Next Visit: 04-      CARE ([facility])   FAMILY Obrien  Pat                                                            Last  Test          Frequency    Reason                     Performed    Due Date  --------------------------------------------------------------------------------    HBA1C.......  6 months...  JARDIANCE, metFORMIN,      08- 02-                             semaglutide..............    [employer] Embedded Care Due Messages. Reference number: [id number].   3/17/2025 12:17:00 AM CDT

## 2025-04-09 ENCOUNTER — RESULTS FOLLOW-UP (OUTPATIENT)
Dept: FAMILY MEDICINE | Facility: CLINIC | Age: 60
End: 2025-04-09

## 2025-04-09 ENCOUNTER — TELEPHONE (OUTPATIENT)
Dept: FAMILY MEDICINE | Facility: CLINIC | Age: 60
End: 2025-04-09
Payer: COMMERCIAL

## 2025-04-09 ENCOUNTER — LAB VISIT (OUTPATIENT)
Dept: LAB | Facility: HOSPITAL | Age: 60
End: 2025-04-09
Attending: INTERNAL MEDICINE
Payer: COMMERCIAL

## 2025-04-09 DIAGNOSIS — Z12.5 SCREENING FOR PROSTATE CANCER: ICD-10-CM

## 2025-04-09 DIAGNOSIS — E78.5 DYSLIPIDEMIA: ICD-10-CM

## 2025-04-09 DIAGNOSIS — E78.5 DYSLIPIDEMIA: Primary | ICD-10-CM

## 2025-04-09 DIAGNOSIS — E11.9 TYPE 2 DIABETES MELLITUS WITHOUT COMPLICATION, WITHOUT LONG-TERM CURRENT USE OF INSULIN: ICD-10-CM

## 2025-04-09 LAB
ANION GAP (SMH): 6 MMOL/L (ref 8–16)
BUN SERPL-MCNC: 20 MG/DL (ref 8–23)
CALCIUM SERPL-MCNC: 9.3 MG/DL (ref 8.7–10.5)
CHLORIDE SERPL-SCNC: 106 MMOL/L (ref 95–110)
CHOLEST SERPL-MCNC: 179 MG/DL (ref 120–199)
CHOLEST/HDLC SERPL: 4.2 {RATIO} (ref 2–5)
CO2 SERPL-SCNC: 23 MMOL/L (ref 23–29)
CREAT SERPL-MCNC: 0.8 MG/DL (ref 0.5–1.4)
EAG (SMH): 226 MG/DL (ref 68–131)
GFR SERPLBLD CREATININE-BSD FMLA CKD-EPI: >60 ML/MIN/1.73/M2
GLUCOSE SERPL-MCNC: 249 MG/DL (ref 70–110)
HBA1C MFR BLD: 9.5 % (ref 4.5–6.2)
HDLC SERPL-MCNC: 43 MG/DL (ref 40–75)
HDLC SERPL: 24 % (ref 20–50)
LDLC SERPL CALC-MCNC: 101.4 MG/DL (ref 63–159)
NONHDLC SERPL-MCNC: 136 MG/DL
POTASSIUM SERPL-SCNC: 4.5 MMOL/L (ref 3.5–5.1)
PSA SERPL-MCNC: 1.27 NG/ML (ref ?–4)
SODIUM SERPL-SCNC: 135 MMOL/L (ref 136–145)
TRIGL SERPL-MCNC: 173 MG/DL (ref 30–150)

## 2025-04-09 PROCEDURE — 80048 BASIC METABOLIC PNL TOTAL CA: CPT

## 2025-04-09 PROCEDURE — 83036 HEMOGLOBIN GLYCOSYLATED A1C: CPT

## 2025-04-09 PROCEDURE — 84153 ASSAY OF PSA TOTAL: CPT

## 2025-04-09 PROCEDURE — 80061 LIPID PANEL: CPT

## 2025-04-09 PROCEDURE — 36415 COLL VENOUS BLD VENIPUNCTURE: CPT

## 2025-04-09 RX ORDER — RAMIPRIL 1.25 MG/1
CAPSULE ORAL
Qty: 90 CAPSULE | Refills: 1 | Status: SHIPPED | OUTPATIENT
Start: 2025-04-09

## 2025-04-09 NOTE — TELEPHONE ENCOUNTER
No care due was identified.  [facility] Embedded Care Due Messages. Reference number: [id number].   4/09/2025 12:51:52 PM CDT

## 2025-04-09 NOTE — TELEPHONE ENCOUNTER
Refill Decision Note   Pedro Luis Pérez  is requesting a refill authorization.  Brief Assessment and Rationale for Refill:  Approve     Medication Therapy Plan:        Comments:     Note composed:1:27 PM 04/09/2025

## 2025-04-12 NOTE — PROGRESS NOTES
Subjective:       Patient ID: Pedro Luis Pérez is a 59 y.o. male.    Chief Complaint: Diabetes, Cough, URI (Exposed to flu  ), Sinus Problem, and Hyperlipidemia    History of Present Illness    CHIEF COMPLAINT:  Pedro Luis presents for a follow-up visit to discuss diabetes management and medication adjustments.    HPI:-    Diabetes mellitus:-  Pedro Luis reports a glucose level of 249 this morning, indicating poor glycemic control. He states that both his parents had diabetes and required insulin. He expresses concern about his fluctuating blood sugar, noting that his mother has similar issues with insulin. He is aware of the potential need for insulin in the future.     He also mentions recent exposure to individuals with flu-like symptoms and has proactively taken OTC medication (Thermaflu) from [facility] [facility] to prevent illness. He denies having a fever or other flu-like symptoms.    Hyperlipidemia:-currently on rosuvastatin 5 mg and tolerating it well.    Renal protection:-currently on ramipril 1.25 mg.    Mood disorder:-longstanding mood disorder and currently on Lexapro 10 mg per day.  Keeps him in even keel.  Doing okay.  No significant ups or Downs.    Sinus problems:-has sinus allergies now superimposed with flu-like symptoms.  Continue with azelastine and a new prescription given.    MEDICATIONS:  Pedro Luis is on Rybelsus (semaglutide) 7 mg daily and Jardiance for diabetes management. He is also taking Escitalopram (Acetilopram) as an antidepressant, Ramipril to protect his kidneys, and Rosuvastatin 5 mg 3 times per week for cholesterol control. He uses a nasal spray.    MEDICAL HISTORY:  Pedro Luis has a history of diabetes. His last colon cancer screening was done in 2020.    FAMILY HISTORY:  Family history is significant for diabetes in both parents, with his mother and father requiring insulin for management.    SURGICAL HISTORY:  Pedro Luis had a toe fracture involving the second and third toes of his left foot, which healed by themselves.  He also has a toenail injury on his left great toenail, presenting as a subungual hematoma that is currently healing.    TEST RESULTS:  Pedro Luis's glucose level this morning was 249 mg/dL. Today, the patient underwent a monofilament test and temperature sensation test, both showing intact sensation in his feet. He had a colon cancer screening in 2020.      ROS:  General: -fever, -chills, -fatigue, -weight gain, -weight loss  Cardiovascular: -chest pain, -palpitations, -lower extremity edema  Respiratory: -cough, -shortness of breath  Gastrointestinal: -abdominal pain, -nausea, -vomiting, -diarrhea, -constipation, -blood in stool  Skin: -rash, -lesion  Neurological: -headache, -dizziness, -numbness, -tingling         Past Medical History:   Diagnosis Date    Anxiety     Diabetes mellitus, type 2     History of colonoscopy 11/5/2020    Dr. Stephanie Morin.  Entire portion of the ileum was normal.  Nonbleeding internal hemorrhoids.  External examined colon is normal.  No specimens collected.  Repeat colonoscopy in 5 years for surveillance.     Social History[1]  Past Surgical History:   Procedure Laterality Date    COLONOSCOPY      x 2-3    LAPAROSCOPIC CHOLECYSTECTOMY N/A 5/10/2021    Procedure: CHOLECYSTECTOMY, LAPAROSCOPIC;  Surgeon: Gibran Lowe III, MD;  Location: [facility] OR;  Service: General;  Laterality: N/A;     Family History   Problem Relation Name Age of Onset    Heart disease Mother      Diabetes Father         Objective:      Physical Exam  Musculoskeletal:      Right foot: Normal range of motion.      Left foot: Normal range of motion.        Feet:    Feet:      Right foot:      Protective Sensation: 5 sites tested.  5 sites sensed.      Skin integrity: No ulcer or blister.      Left foot:      Protective Sensation: 5 sites tested.  5 sites sensed.      Skin integrity: No ulcer or blister.      Comments: Small subungual hematoma getting better      Blood pressure 115/72, pulse 76, temperature 98.1 °F (36.7  "°C), height 5' 8" (1.727 m), weight 72 kg (158 lb 11.7 oz). Body mass index is 24.14 kg/m².  Physical Exam    General: No acute distress. Well-developed. Well-nourished.  Eyes: EOMI. Sclerae anicteric.  Cardiovascular: Regular rate. Regular rhythm. No murmurs. No rubs. No gallops. Normal S1, S2. Heart sounds are good.  Respiratory: Normal respiratory effort. Clear to auscultation bilaterally. No rales. No rhonchi. No wheezing. Lungs are clear.  Musculoskeletal: No  obvious deformity.  Extremities: No lower extremity edema. Feet temperature sensations are intact. Monofilament sensations are intact.  Neurological: Alert & oriented .  Psychiatric:  Appropriate   Skin: Warm. Dry. Nail size is good. Subungual hematoma at the tip of the left great toenail.              Assessment:       Lab Visit on 04/09/2025   Component Date Value Ref Range Status    Hemoglobin A1c 04/09/2025 9.5 (H)  4.5 - 6.2 % Final    Estimated Average Glucose 04/09/2025 226 (H)  68 - 131 mg/dL Final    Prostate Specific Antigen 04/09/2025 1.27  Not established ng/mL Final    Sodium 04/09/2025 135 (L)  136 - 145 mmol/L Final    Potassium 04/09/2025 4.5  3.5 - 5.1 mmol/L Final    Chloride 04/09/2025 106  95 - 110 mmol/L Final    CO2 04/09/2025 23  23 - 29 mmol/L Final    Glucose 04/09/2025 249 (H)  70 - 110 mg/dL Final    BUN 04/09/2025 20  8 - 23 mg/dL Final    Creatinine 04/09/2025 0.8  0.5 - 1.4 mg/dL Final    Calcium 04/09/2025 9.3  8.7 - 10.5 mg/dL Final    Anion Gap 04/09/2025 6 (L)  8 - 16 mmol/L Final    eGFR 04/09/2025 >60  >60 mL/min/1.73/m2 Final    Cholesterol Total 04/09/2025 179  120 - 199 mg/dL Final    Triglyceride 04/09/2025 173 (H)  30 - 150 mg/dL Final    HDL Cholesterol 04/09/2025 43  40 - 75 mg/dL Final    LDL Cholesterol 04/09/2025 101.4  63.0 - 159.0 mg/dL Final    HDL/Cholesterol Ratio 04/09/2025 24.0  20.0 - 50.0 % Final    Cholesterol/HDL Ratio 04/09/2025 4.2  2.0 - 5.0 Final    Non HDL Cholesterol 04/09/2025 136  mg/dL " Final     Component  Ref Range & Units (hover) 3 d ago  (4/9/25) 7 mo ago  (8/26/24) 1 yr ago  (9/22/23) 1 yr ago  (4/26/23) 2 yr ago  (6/28/22) 3 yr ago  (1/4/22) 4 yr ago  (11/5/20)   Hemoglobin A1c 9.5 High  9.2 High  R, CM 9.0 High  R, CM 8.8 High  R, CM 9.2 High  R, CM 8.3 High  R, CM 8.6 High      Assessment & Plan    E11.65 Type 2 diabetes mellitus with hyperglycemia  F32.9 Major depressive disorder, single episode, unspecified  E78.5 Hyperlipidemia, unspecified  L60.1 Onycholysis  Z87.81 Personal history of (healed) traumatic fracture  Z83.3 Family history of diabetes mellitus  Z20.828 Contact with and (suspected) exposure to other viral communicable diseases  Z79.4 Long term (current) use of insulin    IMPRESSION:  - Increased Rybelsus to 14 mg (from previous lower dose), the highest dose, to address elevated glucose (249 mg/dL).  - Considered possibility of requiring insulin therapy in the future based on family history and current glucose control.  - Ordered labs to differentiate between Type 1 and Type 2 diabetes, suspecting slowing pancreas function.  - Performed foot exam to assess for diabetic neuropathy; noted intact temperature and monofilament sensations.    E11.65 TYPE 2 DIABETES MELLITUS WITH HYPERGLYCEMIA:  - Monitored the patient's glucose level, which was elevated at 249 this morning.  - Evaluated the patient's condition, assessing that the pancreas is slowing down, causing increased blood sugar.  - Performed physical exam including heart and lung sounds, and foot exam to check for neuropathy.  - Ordered labs to be checked again before the next visit in 3 months to assess diabetes management.  - Ordered tests to determine if the diabetes is type 1 or type 2.  - Increased Rybelsus dosage to 14 mg, which is the highest dose.  - Continued Jardiance for diabetes management.  - Prescribed Ramipril to protect the patient's kidneys.  - Recommend pneumonia vaccine for diabetic patients.  - Explained  "that diabetic patients are more susceptible to infections, including pneumonia.  - Scheduled eye exam and requested report to be sent.    F32.9 MAJOR DEPRESSIVE DISORDER, SINGLE EPISODE, UNSPECIFIED:  - Continued Escitalopram (referred to as "happy pill") for depression management.    E78.5 HYPERLIPIDEMIA, UNSPECIFIED:  - Prescribed Rosuvastatin 5 mg 3 times per week for cholesterol management.    L60.1 ONYCHOLYSIS:  - Evaluated the subungual hematoma on the left great toenail, which is healing as the toenail progresses.    Z87.81 PERSONAL HISTORY OF (HEALED) TRAUMATIC FRACTURE:  - Noted the patient's history of healed fractures on the second and third toe of the left foot.    Z83.3 FAMILY HISTORY OF DIABETES MELLITUS:  - Documented the patient's family history of diabetes, noting that both mother and father had diabetes and required insulin.    Z20.828 CONTACT WITH AND (SUSPECTED) EXPOSURE TO OTHER VIRAL COMMUNICABLE DISEASES:  - Monitored the patient's potential exposure to flu.  - Prescribed TamiFlu prophylactically: 1 capsule daily for flu prevention.  - Instructed to increase to twice daily if fever develops, and continue until finished.  - Advised to take the first dose immediately after picking up from pharmacy.  - Requested the patient to send a message to update on flu and cold symptoms.    Z79.4 LONG TERM (CURRENT) USE OF INSULIN:  - Assessed that the patient might need insulin in the future.         Plan:   Type 2 diabetes mellitus with hyperglycemia, without long-term current use of insulin  -     Hemoglobin A1C; Future; Expected date: 06/23/2025  -     Glutamic Acid Decarboxylase; Future; Expected date: 06/23/2025  -     C-Peptide; Future; Expected date: 06/23/2025  -     semaglutide (RYBELSUS) 14 mg tablet; Take 1 tablet (14 mg total) by mouth once daily.  Dispense: 30 tablet; Refill: 5    Seasonal allergic rhinitis due to pollen  -     azelastine (ASTELIN) 137 mcg (0.1 %) nasal spray; 1 spray (137 mcg " total) by Nasal route 2 (two) times daily.  Dispense: 30 mL; Refill: 11    Dyslipidemia  -     rosuvastatin (CRESTOR) 5 MG tablet; Take 1 tablet (5 mg total) by mouth every Mon, Wed, Fri.  Dispense: 24 tablet; Refill: 3    Mood disorder    Erectile dysfunction, unspecified erectile dysfunction type    Screening for prostate cancer  -     PSA, Screening; Future; Expected date: 06/23/2025    Exposure to influenza  -     oseltamivir (TAMIFLU) 75 MG capsule; Take 1 capsule (75 mg total) by mouth once daily. for 10 days  Dispense: 10 capsule; Refill: 0    Type 2 diabetes mellitus without complication, without long-term current use of insulin  -     rosuvastatin (CRESTOR) 5 MG tablet; Take 1 tablet (5 mg total) by mouth every Mon, Wed, Fri.  Dispense: 24 tablet; Refill: 3    Dyslipidemia  Comments:  Has somewhat LDL elevation.  Agrees to take rosuvastatin or Crestor at least twice a week.  Prescription sent to pharmacy.  Orders:  -     rosuvastatin (CRESTOR) 5 MG tablet; Take 1 tablet (5 mg total) by mouth every Mon, Wed, Fri.  Dispense: 24 tablet; Refill: 3    Type 2 diabetes mellitus without complication, without long-term current use of insulin  Comments:  Again referral has been given to dietitian.  Defer the decision for GLP 1 or insulin at this point.  Let us see how he does with diet.  Orders:  -     rosuvastatin (CRESTOR) 5 MG tablet; Take 1 tablet (5 mg total) by mouth every Mon, Wed, Fri.  Dispense: 24 tablet; Refill: 3          Poorly-controlled diabetes has been noted with A1c levels persistently above 7 and with inclusion of semaglutide (Rybelsus), metformin at maximum dosage and also Jardiance 25 mg at maximum dosage.  , assuming compliance, will check an offer type 1 diabetes and order theresa 65 antibodies, zinc transporter on antibodies, insulin antibodies.    May have to now switch over to insulins and had a discussion with patient concerning this.  At this point Jeramie would like to defer any insulin and  would like to increase the dosage of Rybelsus to maximum of 40 mg and continue to watch his diet.    Reviewed his family history and potential for any autoimmune disorder.    Reviewed a past CT scan of abdomen done in 2023 which had shown normal appearing pancreas.  (Prior cholecystectomy and a 4 mm nonobstructing right renal stone were additional findings)  Follow up in about 6 months (around 10/14/2025) for Diabetes/HTN/Lipids.    Current Medications[2]    This note was generated with the assistance of ambient listening technology. Verbal consent was obtained by the patient and accompanying visitor(s) for the recording of patient appointment to facilitate this note. I attest to having reviewed and edited the generated note for accuracy, though some syntax or spelling errors may persist. Please contact the author of this note for any clarification.      Alan Stewart    Visit today included increased complexity associated with the care of the episodic problem DM Lipids addressed and managing the longitudinal care of the patient due to the serious and/or complex managed problem(s) DM Lipids.          [1]   Social History  Socioeconomic History    Marital status:      Spouse name: Regla    Number of children: 4   Occupational History    Occupation: Self Employed- [employer]   Tobacco Use    Smoking status: Never    Smokeless tobacco: Former     Types: Chew     Quit date: 2000   Substance and Sexual Activity    Alcohol use: No    Drug use: No    Sexual activity: Yes     Partners: Female     Social Drivers of Health     Financial Resource Strain: Low Risk  (6/15/2023)    Overall Financial Resource Strain (CARDIA)     Difficulty of Paying Living Expenses: Not very hard   Food Insecurity: No Food Insecurity (6/15/2023)    Hunger Vital Sign     Worried About Running Out of Food in the Last Year: Never true     Ran Out of Food in the Last Year: Never true   Transportation Needs: No Transportation Needs (6/15/2023)     PRAPARE - Transportation     Lack of Transportation (Medical): No     Lack of Transportation (Non-Medical): No   Physical Activity: Inactive (6/15/2023)    Exercise Vital Sign     Days of Exercise per Week: 0 days     Minutes of Exercise per Session: 0 min   Stress: Stress Concern Present (6/15/2023)    [employer] [place] [employer] - Occupational Stress Questionnaire     Feeling of Stress : To some extent   Housing Stability: Low Risk  (6/15/2023)    Housing Stability Vital Sign     Unable to Pay for Housing in the Last Year: No     Number of Places Lived in the Last Year: 1     Unstable Housing in the Last Year: No   [2]   Current Outpatient Medications:     aspirin (ECOTRIN) 81 MG EC tablet, Take 1 tablet (81 mg total) by mouth Daily., Disp: 100 tablet, Rfl: 1    blood sugar diagnostic Strp, 1 each by Other route., Disp: , Rfl:     EScitalopram oxalate (LEXAPRO) 10 MG tablet, , Disp: , Rfl:     JARDIANCE 25 mg tablet, TAKE 1 TABLET(25 MG) BY MOUTH DAILY, Disp: 90 tablet, Rfl: 1    metFORMIN (GLUCOPHAGE) 1000 MG tablet, Take 1 tablet (1,000 mg total) by mouth 2 (two) times daily with meals., Disp: 180 tablet, Rfl: 3    mupirocin (BACTROBAN) 2 % ointment, Apply topically on the armpit at nighttime., Disp: 22 g, Rfl: 0    ramipriL (ALTACE) 1.25 MG capsule, TAKE 1 CAPSULE(1.25 MG) BY MOUTH EVERY DAY, Disp: 90 capsule, Rfl: 1    azelastine (ASTELIN) 137 mcg (0.1 %) nasal spray, 1 spray (137 mcg total) by Nasal route 2 (two) times daily., Disp: 30 mL, Rfl: 11    oseltamivir (TAMIFLU) 75 MG capsule, Take 1 capsule (75 mg total) by mouth once daily. for 10 days, Disp: 10 capsule, Rfl: 0    rosuvastatin (CRESTOR) 5 MG tablet, Take 1 tablet (5 mg total) by mouth every Mon, Wed, Fri., Disp: 24 tablet, Rfl: 3    semaglutide (RYBELSUS) 14 mg tablet, Take 1 tablet (14 mg total) by mouth once daily., Disp: 30 tablet, Rfl: 5

## 2025-04-14 ENCOUNTER — PATIENT MESSAGE (OUTPATIENT)
Dept: FAMILY MEDICINE | Facility: CLINIC | Age: 60
End: 2025-04-14

## 2025-04-14 ENCOUNTER — OFFICE VISIT (OUTPATIENT)
Dept: FAMILY MEDICINE | Facility: CLINIC | Age: 60
End: 2025-04-14
Payer: COMMERCIAL

## 2025-04-14 VITALS
HEIGHT: 68 IN | TEMPERATURE: 98 F | DIASTOLIC BLOOD PRESSURE: 72 MMHG | BODY MASS INDEX: 24.06 KG/M2 | HEART RATE: 76 BPM | SYSTOLIC BLOOD PRESSURE: 115 MMHG | WEIGHT: 158.75 LBS

## 2025-04-14 DIAGNOSIS — E78.5 DYSLIPIDEMIA: ICD-10-CM

## 2025-04-14 DIAGNOSIS — E11.65 TYPE 2 DIABETES MELLITUS WITH HYPERGLYCEMIA, WITHOUT LONG-TERM CURRENT USE OF INSULIN: Primary | ICD-10-CM

## 2025-04-14 DIAGNOSIS — N52.9 ERECTILE DYSFUNCTION, UNSPECIFIED ERECTILE DYSFUNCTION TYPE: ICD-10-CM

## 2025-04-14 DIAGNOSIS — J30.1 SEASONAL ALLERGIC RHINITIS DUE TO POLLEN: ICD-10-CM

## 2025-04-14 DIAGNOSIS — Z12.5 SCREENING FOR PROSTATE CANCER: ICD-10-CM

## 2025-04-14 DIAGNOSIS — E11.9 TYPE 2 DIABETES MELLITUS WITHOUT COMPLICATION, WITHOUT LONG-TERM CURRENT USE OF INSULIN: Chronic | ICD-10-CM

## 2025-04-14 DIAGNOSIS — Z20.828 EXPOSURE TO INFLUENZA: ICD-10-CM

## 2025-04-14 DIAGNOSIS — E11.9 TYPE 2 DIABETES MELLITUS WITHOUT COMPLICATION, WITHOUT LONG-TERM CURRENT USE OF INSULIN: ICD-10-CM

## 2025-04-14 DIAGNOSIS — F39 MOOD DISORDER: ICD-10-CM

## 2025-04-14 PROCEDURE — 1159F MED LIST DOCD IN RCRD: CPT | Mod: CPTII,S$GLB,, | Performed by: INTERNAL MEDICINE

## 2025-04-14 PROCEDURE — G2211 COMPLEX E/M VISIT ADD ON: HCPCS | Mod: S$GLB,,, | Performed by: INTERNAL MEDICINE

## 2025-04-14 PROCEDURE — 3074F SYST BP LT 130 MM HG: CPT | Mod: CPTII,S$GLB,, | Performed by: INTERNAL MEDICINE

## 2025-04-14 PROCEDURE — 99214 OFFICE O/P EST MOD 30 MIN: CPT | Mod: S$GLB,,, | Performed by: INTERNAL MEDICINE

## 2025-04-14 PROCEDURE — 3078F DIAST BP <80 MM HG: CPT | Mod: CPTII,S$GLB,, | Performed by: INTERNAL MEDICINE

## 2025-04-14 PROCEDURE — 4010F ACE/ARB THERAPY RXD/TAKEN: CPT | Mod: CPTII,S$GLB,, | Performed by: INTERNAL MEDICINE

## 2025-04-14 PROCEDURE — 99999 PR PBB SHADOW E&M-EST. PATIENT-LVL III: CPT | Mod: PBBFAC,,, | Performed by: INTERNAL MEDICINE

## 2025-04-14 PROCEDURE — 3046F HEMOGLOBIN A1C LEVEL >9.0%: CPT | Mod: CPTII,S$GLB,, | Performed by: INTERNAL MEDICINE

## 2025-04-14 PROCEDURE — 1160F RVW MEDS BY RX/DR IN RCRD: CPT | Mod: CPTII,S$GLB,, | Performed by: INTERNAL MEDICINE

## 2025-04-14 PROCEDURE — 3008F BODY MASS INDEX DOCD: CPT | Mod: CPTII,S$GLB,, | Performed by: INTERNAL MEDICINE

## 2025-04-14 RX ORDER — OSELTAMIVIR PHOSPHATE 75 MG/1
75 CAPSULE ORAL DAILY
Qty: 10 CAPSULE | Refills: 0 | Status: SHIPPED | OUTPATIENT
Start: 2025-04-14 | End: 2025-04-24

## 2025-04-14 RX ORDER — AZELASTINE 1 MG/ML
1 SPRAY, METERED NASAL 2 TIMES DAILY
Qty: 30 ML | Refills: 11 | Status: SHIPPED | OUTPATIENT
Start: 2025-04-14

## 2025-04-14 RX ORDER — ORAL SEMAGLUTIDE 14 MG/1
14 TABLET ORAL DAILY
Qty: 30 TABLET | Refills: 5 | Status: SHIPPED | OUTPATIENT
Start: 2025-04-14 | End: 2025-10-11

## 2025-04-14 RX ORDER — ROSUVASTATIN CALCIUM 5 MG/1
5 TABLET, COATED ORAL
Qty: 24 TABLET | Refills: 3 | Status: SHIPPED | OUTPATIENT
Start: 2025-04-14 | End: 2026-04-14

## 2025-05-19 ENCOUNTER — TELEPHONE (OUTPATIENT)
Dept: FAMILY MEDICINE | Facility: CLINIC | Age: 60
End: 2025-05-19
Payer: COMMERCIAL

## 2025-05-19 NOTE — TELEPHONE ENCOUNTER
----- Message from Taylor sent at 5/19/2025  2:34 PM CDT -----  The patient said he has called every where for his Rybelsus. No one has it in stock right now. He has been out since Wednesday. What can he do? Pt's # [phone] GH

## 2025-05-21 LAB — CRC RECOMMENDATION EXT: NORMAL

## 2025-05-23 ENCOUNTER — PATIENT MESSAGE (OUTPATIENT)
Dept: ADMINISTRATIVE | Facility: HOSPITAL | Age: 60
End: 2025-05-23
Payer: COMMERCIAL

## 2025-05-29 ENCOUNTER — PATIENT MESSAGE (OUTPATIENT)
Dept: ADMINISTRATIVE | Facility: HOSPITAL | Age: 60
End: 2025-05-29
Payer: COMMERCIAL

## 2025-06-17 ENCOUNTER — PATIENT OUTREACH (OUTPATIENT)
Dept: ADMINISTRATIVE | Facility: HOSPITAL | Age: 60
End: 2025-06-17
Payer: COMMERCIAL

## 2025-09-04 DIAGNOSIS — E11.9 TYPE 2 DIABETES MELLITUS WITHOUT COMPLICATION: ICD-10-CM

## (undated) DEVICE — TRAY GENERAL LAPAROSCOPY

## (undated) DEVICE — SOLUTION IRRI H2O BOTTLE 1000ML

## (undated) DEVICE — SUCTION/IRRIGATOR W/TIP

## (undated) DEVICE — SCISSORS 5MM APPLIED MEDICAL   CB030

## (undated) DEVICE — DRESSING MEPORE 2.5 X 3   670800

## (undated) DEVICE — SOLUTION NACL 0.9% 3000ML

## (undated) DEVICE — PAD BOVIE ADULT

## (undated) DEVICE — STERISTRIP 1/2 R1547

## (undated) DEVICE — GLOVE BIOGEL PI ULTRA TOUCH GRAY SZ7.5

## (undated) DEVICE — SWABSTICK BENZOIN S42450

## (undated) DEVICE — SUTURE MONOCRYL 4-0 PS-2 27 MCP426H

## (undated) DEVICE — SUTURE ETHIBOND 0 MO-6 18 CX45D

## (undated) DEVICE — SUTURE VICRYL 3-0 SH 27 VCP416H

## (undated) DEVICE — UNDERGLOVE BIOGEL PI MICRO BLUE SZ 8

## (undated) DEVICE — TROCAR OPTICAL ZTHREAD 5MMX100MM CTF03

## (undated) DEVICE — TROCAR BALL. BLNT HASSON C0R47

## (undated) DEVICE — SOLUTION IRRI NS BOTTLE 1000ML R5200-01

## (undated) DEVICE — SLEEVE TROCAR 5MMX100MM  CTS02

## (undated) DEVICE — CABLE MONOPOLAR 10FT DISPOSABLE

## (undated) DEVICE — APPLIER CLIP  5MM